# Patient Record
Sex: FEMALE | Race: BLACK OR AFRICAN AMERICAN | Employment: OTHER | URBAN - METROPOLITAN AREA
[De-identification: names, ages, dates, MRNs, and addresses within clinical notes are randomized per-mention and may not be internally consistent; named-entity substitution may affect disease eponyms.]

---

## 2017-05-27 ENCOUNTER — APPOINTMENT (OUTPATIENT)
Dept: GENERAL RADIOLOGY | Age: 72
DRG: 281 | End: 2017-05-27
Attending: NURSE PRACTITIONER
Payer: MEDICARE

## 2017-05-27 ENCOUNTER — APPOINTMENT (OUTPATIENT)
Dept: CT IMAGING | Age: 72
DRG: 281 | End: 2017-05-27
Attending: NURSE PRACTITIONER
Payer: MEDICARE

## 2017-05-27 ENCOUNTER — HOSPITAL ENCOUNTER (INPATIENT)
Age: 72
LOS: 3 days | Discharge: ANOTHER HOSPITAL | DRG: 281 | End: 2017-05-30
Attending: EMERGENCY MEDICINE | Admitting: STUDENT IN AN ORGANIZED HEALTH CARE EDUCATION/TRAINING PROGRAM
Payer: MEDICARE

## 2017-05-27 DIAGNOSIS — K85.90 ACUTE PANCREATITIS, UNSPECIFIED COMPLICATION STATUS, UNSPECIFIED PANCREATITIS TYPE: Primary | ICD-10-CM

## 2017-05-27 DIAGNOSIS — R77.8 ELEVATED TROPONIN: ICD-10-CM

## 2017-05-27 PROBLEM — I50.9 CHF (CONGESTIVE HEART FAILURE) (HCC): Status: ACTIVE | Noted: 2017-05-27

## 2017-05-27 PROBLEM — F10.20 ALCOHOLISM (HCC): Status: ACTIVE | Noted: 2017-05-27

## 2017-05-27 LAB
ALBUMIN SERPL BCP-MCNC: 3.4 G/DL (ref 3.5–5)
ALBUMIN/GLOB SERPL: 0.8 {RATIO} (ref 1.1–2.2)
ALP SERPL-CCNC: 107 U/L (ref 45–117)
ALT SERPL-CCNC: 32 U/L (ref 12–78)
AMMONIA PLAS-SCNC: <10 UMOL/L
AMPHET UR QL SCN: NEGATIVE
ANION GAP BLD CALC-SCNC: 20 MMOL/L (ref 5–15)
APPEARANCE UR: CLEAR
APPEARANCE UR: CLEAR
AST SERPL W P-5'-P-CCNC: 39 U/L (ref 15–37)
BACTERIA URNS QL MICRO: ABNORMAL /HPF
BACTERIA URNS QL MICRO: ABNORMAL /HPF
BARBITURATES UR QL SCN: NEGATIVE
BASOPHILS # BLD AUTO: 0.1 K/UL (ref 0–0.1)
BASOPHILS # BLD: 1 % (ref 0–1)
BENZODIAZ UR QL: NEGATIVE
BILIRUB SERPL-MCNC: 0.7 MG/DL (ref 0.2–1)
BILIRUB UR QL: NEGATIVE
BILIRUB UR QL: NEGATIVE
BNP SERPL-MCNC: 4731 PG/ML (ref 0–125)
BUN SERPL-MCNC: 6 MG/DL (ref 6–20)
BUN/CREAT SERPL: 7 (ref 12–20)
CALCIUM SERPL-MCNC: 8.8 MG/DL (ref 8.5–10.1)
CANNABINOIDS UR QL SCN: NEGATIVE
CHLORIDE SERPL-SCNC: 95 MMOL/L (ref 97–108)
CK MB CFR SERPL CALC: 1.2 % (ref 0–2.5)
CK MB CFR SERPL CALC: 1.5 % (ref 0–2.5)
CK MB SERPL-MCNC: 4.1 NG/ML (ref 5–25)
CK MB SERPL-MCNC: 6.2 NG/ML (ref 5–25)
CK SERPL-CCNC: 356 U/L (ref 26–192)
CK SERPL-CCNC: 417 U/L (ref 26–192)
CO2 SERPL-SCNC: 21 MMOL/L (ref 21–32)
COCAINE UR QL SCN: NEGATIVE
COLOR UR: ABNORMAL
COLOR UR: ABNORMAL
CREAT SERPL-MCNC: 0.86 MG/DL (ref 0.55–1.02)
DIFFERENTIAL METHOD BLD: ABNORMAL
DRUG SCRN COMMENT,DRGCM: NORMAL
EOSINOPHIL # BLD: 0 K/UL (ref 0–0.4)
EOSINOPHIL NFR BLD: 0 % (ref 0–7)
EPITH CASTS URNS QL MICRO: ABNORMAL /LPF
EPITH CASTS URNS QL MICRO: ABNORMAL /LPF
ERYTHROCYTE [DISTWIDTH] IN BLOOD BY AUTOMATED COUNT: 16.2 % (ref 11.5–14.5)
ETHANOL SERPL-MCNC: 79 MG/DL
FOLATE SERPL-MCNC: 6.3 NG/ML (ref 5–21)
GLOBULIN SER CALC-MCNC: 4.4 G/DL (ref 2–4)
GLUCOSE SERPL-MCNC: 103 MG/DL (ref 65–100)
GLUCOSE UR STRIP.AUTO-MCNC: NEGATIVE MG/DL
GLUCOSE UR STRIP.AUTO-MCNC: NEGATIVE MG/DL
HCT VFR BLD AUTO: 34.3 % (ref 35–47)
HGB BLD-MCNC: 11.5 G/DL (ref 11.5–16)
HGB UR QL STRIP: ABNORMAL
HGB UR QL STRIP: ABNORMAL
INR PPP: 1 (ref 0.9–1.1)
KETONES UR QL STRIP.AUTO: ABNORMAL MG/DL
KETONES UR QL STRIP.AUTO: NEGATIVE MG/DL
LEUKOCYTE ESTERASE UR QL STRIP.AUTO: ABNORMAL
LEUKOCYTE ESTERASE UR QL STRIP.AUTO: ABNORMAL
LIPASE SERPL-CCNC: 703 U/L (ref 73–393)
LYMPHOCYTES # BLD AUTO: 24 % (ref 12–49)
LYMPHOCYTES # BLD: 1.3 K/UL (ref 0.8–3.5)
MCH RBC QN AUTO: 29.3 PG (ref 26–34)
MCHC RBC AUTO-ENTMCNC: 33.5 G/DL (ref 30–36.5)
MCV RBC AUTO: 87.5 FL (ref 80–99)
METHADONE UR QL: NEGATIVE
MONOCYTES # BLD: 0.4 K/UL (ref 0–1)
MONOCYTES NFR BLD AUTO: 8 % (ref 5–13)
NEUTS SEG # BLD: 3.7 K/UL (ref 1.8–8)
NEUTS SEG NFR BLD AUTO: 67 % (ref 32–75)
NITRITE UR QL STRIP.AUTO: NEGATIVE
NITRITE UR QL STRIP.AUTO: POSITIVE
OPIATES UR QL: NEGATIVE
PCP UR QL: NEGATIVE
PH UR STRIP: 6.5 [PH] (ref 5–8)
PH UR STRIP: 7.5 [PH] (ref 5–8)
PLATELET # BLD AUTO: 351 K/UL (ref 150–400)
POTASSIUM SERPL-SCNC: 4 MMOL/L (ref 3.5–5.1)
PROT SERPL-MCNC: 7.8 G/DL (ref 6.4–8.2)
PROT UR STRIP-MCNC: 30 MG/DL
PROT UR STRIP-MCNC: 30 MG/DL
PROTHROMBIN TIME: 9.9 SEC (ref 9–11.1)
RBC # BLD AUTO: 3.92 M/UL (ref 3.8–5.2)
RBC #/AREA URNS HPF: ABNORMAL /HPF (ref 0–5)
RBC #/AREA URNS HPF: ABNORMAL /HPF (ref 0–5)
RBC MORPH BLD: ABNORMAL
SODIUM SERPL-SCNC: 136 MMOL/L (ref 136–145)
SP GR UR REFRACTOMETRY: <1.005 (ref 1–1.03)
SP GR UR REFRACTOMETRY: <1.005 (ref 1–1.03)
TROPONIN I SERPL-MCNC: 0.31 NG/ML
TROPONIN I SERPL-MCNC: 0.34 NG/ML
TSH SERPL DL<=0.05 MIU/L-ACNC: 2.42 UIU/ML (ref 0.36–3.74)
UA: UC IF INDICATED,UAUC: ABNORMAL
UROBILINOGEN UR QL STRIP.AUTO: 0.2 EU/DL (ref 0.2–1)
UROBILINOGEN UR QL STRIP.AUTO: 0.2 EU/DL (ref 0.2–1)
WBC # BLD AUTO: 5.5 K/UL (ref 3.6–11)
WBC URNS QL MICRO: ABNORMAL /HPF (ref 0–4)
WBC URNS QL MICRO: ABNORMAL /HPF (ref 0–4)

## 2017-05-27 PROCEDURE — 81001 URINALYSIS AUTO W/SCOPE: CPT | Performed by: STUDENT IN AN ORGANIZED HEALTH CARE EDUCATION/TRAINING PROGRAM

## 2017-05-27 PROCEDURE — 85610 PROTHROMBIN TIME: CPT | Performed by: NURSE PRACTITIONER

## 2017-05-27 PROCEDURE — 82550 ASSAY OF CK (CPK): CPT | Performed by: NURSE PRACTITIONER

## 2017-05-27 PROCEDURE — 87086 URINE CULTURE/COLONY COUNT: CPT | Performed by: NURSE PRACTITIONER

## 2017-05-27 PROCEDURE — 99285 EMERGENCY DEPT VISIT HI MDM: CPT

## 2017-05-27 PROCEDURE — 70450 CT HEAD/BRAIN W/O DYE: CPT

## 2017-05-27 PROCEDURE — 80307 DRUG TEST PRSMV CHEM ANLYZR: CPT | Performed by: NURSE PRACTITIONER

## 2017-05-27 PROCEDURE — 85025 COMPLETE CBC W/AUTO DIFF WBC: CPT | Performed by: NURSE PRACTITIONER

## 2017-05-27 PROCEDURE — 84443 ASSAY THYROID STIM HORMONE: CPT | Performed by: STUDENT IN AN ORGANIZED HEALTH CARE EDUCATION/TRAINING PROGRAM

## 2017-05-27 PROCEDURE — 65660000000 HC RM CCU STEPDOWN

## 2017-05-27 PROCEDURE — 93005 ELECTROCARDIOGRAM TRACING: CPT

## 2017-05-27 PROCEDURE — 71020 XR CHEST PA LAT: CPT

## 2017-05-27 PROCEDURE — 83690 ASSAY OF LIPASE: CPT | Performed by: NURSE PRACTITIONER

## 2017-05-27 PROCEDURE — 83880 ASSAY OF NATRIURETIC PEPTIDE: CPT | Performed by: NURSE PRACTITIONER

## 2017-05-27 PROCEDURE — 36415 COLL VENOUS BLD VENIPUNCTURE: CPT | Performed by: STUDENT IN AN ORGANIZED HEALTH CARE EDUCATION/TRAINING PROGRAM

## 2017-05-27 PROCEDURE — 82746 ASSAY OF FOLIC ACID SERUM: CPT | Performed by: STUDENT IN AN ORGANIZED HEALTH CARE EDUCATION/TRAINING PROGRAM

## 2017-05-27 PROCEDURE — 87077 CULTURE AEROBIC IDENTIFY: CPT | Performed by: NURSE PRACTITIONER

## 2017-05-27 PROCEDURE — 87186 SC STD MICRODIL/AGAR DIL: CPT | Performed by: NURSE PRACTITIONER

## 2017-05-27 PROCEDURE — 93306 TTE W/DOPPLER COMPLETE: CPT

## 2017-05-27 PROCEDURE — 80053 COMPREHEN METABOLIC PANEL: CPT | Performed by: NURSE PRACTITIONER

## 2017-05-27 PROCEDURE — 84484 ASSAY OF TROPONIN QUANT: CPT | Performed by: NURSE PRACTITIONER

## 2017-05-27 PROCEDURE — 74011250637 HC RX REV CODE- 250/637: Performed by: NURSE PRACTITIONER

## 2017-05-27 PROCEDURE — 74011250637 HC RX REV CODE- 250/637: Performed by: STUDENT IN AN ORGANIZED HEALTH CARE EDUCATION/TRAINING PROGRAM

## 2017-05-27 PROCEDURE — 82140 ASSAY OF AMMONIA: CPT | Performed by: NURSE PRACTITIONER

## 2017-05-27 PROCEDURE — 74011000258 HC RX REV CODE- 258: Performed by: STUDENT IN AN ORGANIZED HEALTH CARE EDUCATION/TRAINING PROGRAM

## 2017-05-27 PROCEDURE — 74011250636 HC RX REV CODE- 250/636: Performed by: STUDENT IN AN ORGANIZED HEALTH CARE EDUCATION/TRAINING PROGRAM

## 2017-05-27 PROCEDURE — 81001 URINALYSIS AUTO W/SCOPE: CPT | Performed by: NURSE PRACTITIONER

## 2017-05-27 RX ORDER — FUROSEMIDE 40 MG/1
40 TABLET ORAL DAILY
Status: DISCONTINUED | OUTPATIENT
Start: 2017-05-28 | End: 2017-05-28

## 2017-05-27 RX ORDER — SODIUM CHLORIDE 0.9 % (FLUSH) 0.9 %
5-10 SYRINGE (ML) INJECTION EVERY 8 HOURS
Status: DISCONTINUED | OUTPATIENT
Start: 2017-05-27 | End: 2017-05-30 | Stop reason: HOSPADM

## 2017-05-27 RX ORDER — CARVEDILOL 25 MG/1
25 TABLET ORAL 2 TIMES DAILY WITH MEALS
COMMUNITY
End: 2020-01-28

## 2017-05-27 RX ORDER — DOCUSATE SODIUM 100 MG/1
100 CAPSULE, LIQUID FILLED ORAL 2 TIMES DAILY
Status: DISCONTINUED | OUTPATIENT
Start: 2017-05-27 | End: 2017-05-30 | Stop reason: HOSPADM

## 2017-05-27 RX ORDER — CARVEDILOL 12.5 MG/1
25 TABLET ORAL 2 TIMES DAILY WITH MEALS
Status: DISCONTINUED | OUTPATIENT
Start: 2017-05-27 | End: 2017-05-28

## 2017-05-27 RX ORDER — ATORVASTATIN CALCIUM 40 MG/1
80 TABLET, FILM COATED ORAL DAILY
Status: DISCONTINUED | OUTPATIENT
Start: 2017-05-28 | End: 2017-05-29

## 2017-05-27 RX ORDER — ZOLPIDEM TARTRATE 5 MG/1
5 TABLET ORAL
Status: DISCONTINUED | OUTPATIENT
Start: 2017-05-27 | End: 2017-05-30 | Stop reason: HOSPADM

## 2017-05-27 RX ORDER — ATORVASTATIN CALCIUM 80 MG/1
80 TABLET, FILM COATED ORAL DAILY
COMMUNITY

## 2017-05-27 RX ORDER — CYANOCOBALAMIN 1000 UG/ML
1000 INJECTION, SOLUTION INTRAMUSCULAR; SUBCUTANEOUS DAILY
Status: DISCONTINUED | OUTPATIENT
Start: 2017-05-28 | End: 2017-05-28

## 2017-05-27 RX ORDER — ONDANSETRON 2 MG/ML
4 INJECTION INTRAMUSCULAR; INTRAVENOUS
Status: DISCONTINUED | OUTPATIENT
Start: 2017-05-27 | End: 2017-05-30 | Stop reason: HOSPADM

## 2017-05-27 RX ORDER — LORAZEPAM 1 MG/1
0.5 TABLET ORAL
Status: COMPLETED | OUTPATIENT
Start: 2017-05-27 | End: 2017-05-27

## 2017-05-27 RX ORDER — ACETAMINOPHEN 325 MG/1
650 TABLET ORAL
Status: DISCONTINUED | OUTPATIENT
Start: 2017-05-27 | End: 2017-05-30 | Stop reason: HOSPADM

## 2017-05-27 RX ORDER — LORAZEPAM 2 MG/ML
4 INJECTION INTRAMUSCULAR
Status: DISCONTINUED | OUTPATIENT
Start: 2017-05-27 | End: 2017-05-28

## 2017-05-27 RX ORDER — ALLOPURINOL 100 MG/1
100 TABLET ORAL
COMMUNITY

## 2017-05-27 RX ORDER — SODIUM CHLORIDE 0.9 % (FLUSH) 0.9 %
5-10 SYRINGE (ML) INJECTION AS NEEDED
Status: DISCONTINUED | OUTPATIENT
Start: 2017-05-27 | End: 2017-05-30 | Stop reason: HOSPADM

## 2017-05-27 RX ORDER — TRAMADOL HYDROCHLORIDE 50 MG/1
50-100 TABLET ORAL
COMMUNITY
End: 2020-01-28

## 2017-05-27 RX ORDER — LORAZEPAM 2 MG/ML
2 INJECTION INTRAMUSCULAR
Status: DISPENSED | OUTPATIENT
Start: 2017-05-27 | End: 2017-05-27

## 2017-05-27 RX ORDER — CLOPIDOGREL BISULFATE 75 MG/1
75 TABLET ORAL
COMMUNITY

## 2017-05-27 RX ORDER — FUROSEMIDE 40 MG/1
40 TABLET ORAL DAILY
Status: ON HOLD | COMMUNITY
End: 2020-01-28 | Stop reason: SDUPTHER

## 2017-05-27 RX ORDER — CLOPIDOGREL BISULFATE 75 MG/1
75 TABLET ORAL DAILY
Status: DISCONTINUED | OUTPATIENT
Start: 2017-05-27 | End: 2017-05-30 | Stop reason: HOSPADM

## 2017-05-27 RX ORDER — LORAZEPAM 2 MG/ML
2 INJECTION INTRAMUSCULAR
Status: DISCONTINUED | OUTPATIENT
Start: 2017-05-27 | End: 2017-05-28

## 2017-05-27 RX ORDER — THIAMINE HYDROCHLORIDE 100 MG/ML
100 INJECTION, SOLUTION INTRAMUSCULAR; INTRAVENOUS DAILY
Status: DISCONTINUED | OUTPATIENT
Start: 2017-05-28 | End: 2017-05-29

## 2017-05-27 RX ADMIN — LORAZEPAM 2 MG: 2 INJECTION INTRAMUSCULAR; INTRAVENOUS at 16:58

## 2017-05-27 RX ADMIN — CARVEDILOL 25 MG: 12.5 TABLET, FILM COATED ORAL at 17:03

## 2017-05-27 RX ADMIN — DOCUSATE SODIUM 100 MG: 100 CAPSULE, LIQUID FILLED ORAL at 16:56

## 2017-05-27 RX ADMIN — CEFTRIAXONE SODIUM 1 G: 1 INJECTION, POWDER, FOR SOLUTION INTRAMUSCULAR; INTRAVENOUS at 19:28

## 2017-05-27 RX ADMIN — CLOPIDOGREL BISULFATE 75 MG: 75 TABLET ORAL at 16:56

## 2017-05-27 RX ADMIN — Medication 10 ML: at 23:52

## 2017-05-27 RX ADMIN — LORAZEPAM 0.5 MG: 1 TABLET ORAL at 13:23

## 2017-05-27 RX ADMIN — Medication 10 ML: at 17:00

## 2017-05-27 NOTE — PROGRESS NOTES
Pharmacy Medication Reconciliation     Recommendations/Findings:   1) pt takes tramadol 50 mg tab 1 or 2 tabs daily as needed for pain    -Clarified PTA med list with patient and rx query. PTA medication list was corrected to the following:     Prior to Admission Medications   Prescriptions Last Dose Informant Patient Reported? Taking?   allopurinol (ZYLOPRIM) 100 mg tablet Unknown at Unknown time  Yes No   Sig: Take 100 mg by mouth daily. atorvastatin (LIPITOR) 80 mg tablet Unknown at Unknown time  Yes No   Sig: Take 80 mg by mouth daily. carvedilol (COREG) 25 mg tablet Unknown at Unknown time  Yes No   Sig: Take 25 mg by mouth two (2) times daily (with meals). clopidogrel (PLAVIX) 75 mg tab Unknown at Unknown time  Yes No   Sig: Take 75 mg by mouth. furosemide (LASIX) 40 mg tablet Unknown at Unknown time  Yes No   Sig: Take 40 mg by mouth daily. traMADol (ULTRAM) 50 mg tablet   Yes Yes   Sig: Take  mg by mouth daily as needed for Pain.       Facility-Administered Medications: None          Thank you,  KERI Espinoza [facility]

## 2017-05-27 NOTE — IP AVS SNAPSHOT
Anthony Joel 
 
 
 [address] [place] Patient: Jazz Penaloza MRN: [id number] :1945 You are allergic to the following No active allergies Recent Documentation Height Weight Breastfeeding? BMI Smoking Status 1.524 m 89.7 kg No 38.63 kg/m2 Former Smoker Emergency Contacts Name Discharge Info Relation Home Work Mobile [facility] DISCHARGE CAREGIVER [3] Sister [23] [phone] Sudarshan Harris DISCHARGE CAREGIVER [3] Other Relative [6]   [phone] About your hospitalization You were admitted on:  May 27, 2017 You last received care in the:  [address] You were discharged on:  May 30, 2017 Unit phone number:  [phone] Why you were hospitalized Your primary diagnosis was:  Alcoholism (Hcc) Your diagnoses also included:  Elevated Troponin, Chf (Congestive Heart Failure) (Hcc) Providers Seen During Your Hospitalizations Provider Role Specialty Primary office phone Sol Shipley MD Attending Provider Emergency Medicine [phone] Bee Kemp MD Attending Provider Internal Medicine [phone] Your Primary Care Physician (PCP) Primary Care Physician Office Phone Office Fax NONE ** None ** ** None ** Follow-up Information Follow up With Details Comments Contact Info [facility] [place] [address] [facility]. will be contacted by a health  to schedule a home visit. [address] [facility] Elvira Mcdonald MD Go on 2017 Your appointment is scheduled for 17 at [facility] [address]. [address] 
[phone] [facility] Cardiology  You will be contacted by Dr. Darrell Knight office with your follow-up appointment date and time. [address] [facility]. [facility] [address] [phone]  None   None (395) Patient stated that they have no PCP 
  
  
 Current Discharge Medication List  
  
ASK your doctor about these medications Dose & Instructions Dispensing Information Comments Morning Noon Evening Bedtime  
 allopurinol 100 mg tablet Commonly known as:  Sampson Mcrae Your last dose was: Your next dose is:    
   
   
 Dose:  100 mg Take 100 mg by mouth daily. Refills:  0  
     
   
   
   
  
 atorvastatin 80 mg tablet Commonly known as:  LIPITOR Your last dose was: Your next dose is:    
   
   
 Dose:  80 mg Take 80 mg by mouth daily. Refills:  0  
     
   
   
   
  
 carvedilol 25 mg tablet Commonly known as:  Sade Stable Your last dose was: Your next dose is:    
   
   
 Dose:  25 mg Take 25 mg by mouth two (2) times daily (with meals). Refills:  0  
     
   
   
   
  
 clopidogrel 75 mg Tab Commonly known as:  PLAVIX Your last dose was: Your next dose is:    
   
   
 Dose:  75 mg Take 75 mg by mouth. Refills:  0  
     
   
   
   
  
 LASIX 40 mg tablet Generic drug:  furosemide Your last dose was: Your next dose is:    
   
   
 Dose:  40 mg Take 40 mg by mouth daily. Refills:  0  
     
   
   
   
  
 traMADol 50 mg tablet Commonly known as:  ULTRAM  
   
Your last dose was: Your next dose is:    
   
   
 Dose:   mg Take  mg by mouth daily as needed for Pain. Refills:  0 Discharge Instructions None Discharge Orders None [facility] Announce[facility] We are excited to announce that we are making your provider's discharge notes available to you in [employer]. You will see these notes when they are completed and signed by the physician that discharged you from your recent hospital stay.   If you have any questions or concerns about any information you see in [employer], please call the [facility] Department where you were seen or reach out to your Primary Care Provider for more information about your plan of care. Introducing \[facility] [place] [facility]\"" & HEALTH SERVICES! [employer] introduces [employer] patient portal. Now you can access parts of your medical record, email your doctor's office, and request medication refills online. 1. In your internet browser, go to [email or web address] 2. Click on the First Time User? Click Here link in the Sign In box. You will see the New Member Sign Up page. 3. Enter your [employer] Access Code exactly as it appears below. You will not need to use this code after youve completed the sign-up process. If you do not sign up before the expiration date, you must request a new code. · [employer] Access Code: [email or web address] Expires: 8/28/2017  5:36 PM 
 
4. Enter the last four digits of your Social Security Number (xxxx) and Date of Birth (mm/dd/yyyy) as indicated and click Submit. You will be taken to the next sign-up page. 5. Create a [employer] ID. This will be your [employer] login ID and cannot be changed, so think of one that is secure and easy to remember. 6. Create a [employer] password. You can change your password at any time. 7. Enter your Password Reset Question and Answer. This can be used at a later time if you forget your password. 8. Enter your e-mail address. You will receive e-mail notification when new information is available in [address] 9. Click Sign Up. You can now view and download portions of your medical record. 10. Click the Download Summary menu link to download a portable copy of your medical information. If you have questions, please visit the Frequently Asked Questions section of the [employer] website. Remember, [employer] is NOT to be used for urgent needs. For medical emergencies, dial 911. Now available from your iPhone and Android! General Information Please provide this summary of care documentation to your next provider. Patient Signature:  ____________________________________________________________ Date:  ____________________________________________________________  
  
Rd Search Provider Signature:  ____________________________________________________________ Date:  ____________________________________________________________

## 2017-05-27 NOTE — ED NOTES
Pt arrives in the ED with her sister . Pt reports that sister wanted her to come to get help for her alcohol problem. Pt has no physical complaints. Pt reports drinking a couple of cups of Gin daily x couple of months worsening over past week. Pt has history of alcoholism. Denies history of DTs.

## 2017-05-27 NOTE — ED NOTES
Pt's meal tray that was ordered with provider approval was removed due to patient experiencing upper abdominal pain. Provider notified.

## 2017-05-27 NOTE — ED TRIAGE NOTES
Pt arrived to ED with sister. Pt denies having any problems or concerns. Sister states she brought patient because she \"has an alcohol problem and won't answer my phone calls, she needs a detox program\". Pt's sister states that patient does not eat and is unable to care for self. Pt states last drink was yesterday, pt denies having a drinking problem. Pt in no acute distress.

## 2017-05-27 NOTE — ROUTINE PROCESS
TRANSFER - OUT REPORT:    Verbal report given to Jamia(name) on Nadia Lazar  being transferred to PCU(unit) for routine progression of care       Report consisted of patients Situation, Background, Assessment and   Recommendations(SBAR). Information from the following report(s) SBAR, ED Summary, [facility] and Recent Results was reviewed with the receiving nurse. Lines:   Peripheral IV 05/27/17 Right Antecubital (Active)   Site Assessment Clean, dry, & intact 5/27/2017 11:26 AM   Phlebitis Assessment 0 5/27/2017 11:26 AM   Infiltration Assessment 0 5/27/2017 11:26 AM   Dressing Status Clean, dry, & intact 5/27/2017 11:26 AM   Hub Color/Line Status Blue 5/27/2017 11:26 AM   Action Taken Blood drawn 5/27/2017 11:26 AM        Opportunity for questions and clarification was provided.       Patient transported with:   Registered Nurse

## 2017-05-27 NOTE — H&P
Pt Name  Chen Valentine   Date of Birth 1945   Medical Record Number  [id number]      Age  80 y.o. PCP None   Admit date:  5/27/2017    Room Number  PCU2/01  @ [facility]   Date of Service  5/27/2017    Chart reviewed   Pt seen and examined       Admission Diagnoses:  Alcoholism [facility])     We are admitting Chen Valentine 80 y.o. female with a principle diagnosis of Alcoholism ([facility] this patient also suffers from other comorbidities listed below. I have a high level of concern for  leading to life threatening complications      Assessment and plan:    ? 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 ED: [address] [facility]/ Hb 12/   Dirty UA/ Cr 0.8  BNP 5K  EKG Q wave in 3,   BAL 89  EKG sinus tachycardia    CXR: The heart is mildly  enlarged, and the patient is post valvuloplasty, CABG, and pacemaker/AICD  placement     Principal Problem:    Alcoholism ([facility] (5/27/2017)    Active Problems:    Elevated troponin (5/27/2017)      CHF (congestive heart failure) ([facility] (5/27/2017)        Body mass index is 27.34 kg/(m^2). Functional Status  good   CODE STATUS  Full   Surrogate decision maker: Sister Luis F Mcleod  SCD's   Discharge Plan: Home w/Family,    There are currently no Active Isolations Payor: [employer] MEDICARE / Plan: [address] [place] [address] HMO / Product Type: Managed Care Medicare /    Social issues  Date Comment           Prognosis          PC:\"Drinking some whiskey and it made me sick. \"    History of Present Illness :  Miss Minna Black is a previous alcoholic but stop drinking for approximately 15 years. Unfortunately, she went to a party recently and resumed drinking heavily over the last three weeks.    Her sister visited today and become concerned as the house was unkempt and patient have not been answering the phone so she brought her to the hospital she stated that the patient was simply not looking herself, and there was no food in the house. Patient also complains of abdominal pain with nausea and vomiting in the last few days. Denies fever or urinary symptoms     History reviewed. No pertinent past medical history. Past Surgical History:   Procedure Laterality Date    CARDIAC SURG PROCEDURE UNLIST      pacemaker/defibrilator placed 2015    HX GYN      hysterectomy      No Known Allergies   Social History     Social History    Marital status:      Spouse name: N/A    Number of children: N/A    Years of education: N/A     Occupational History    Not on file. Social History Main Topics    Smoking status: Former Smoker    Smokeless tobacco: Not on file    Alcohol use No    Drug use: No    Sexual activity: Not on file     Other Topics Concern    Not on file     Social History Narrative    Patient lives alone . She is  and her only daughter  of dialysis related complications. Sister Kari Client to be her medical power of  . Full code.      Social History   Substance Use Topics    Smoking status: Former Smoker    Smokeless tobacco: Not on file    Alcohol use No       Family History   Problem Relation Age of Onset    Heart Disease Other     Kidney Disease Other        Review of Systems:  (negative unless bold)    Gen:  Weight gain, weight loss, fever, chills, fatigue  Eyes:  Visual changes, pain, conjunctivitis  ENT:  Sore throat, rhinorrhea, decreased hearing  CVS:  Palpitations, chest pain, dizziness, syncope, edema, PND  Pulm:  Cough, dyspnea, sputum, hemoptysis, wheezing  GI:  Abdominal pain, nausea, emesis, diarrhea, constipation, GERD, melena  :  Hematuria, incontinence, nocturia, dysuria, discharge  MS:  Pain, weakness, swelling, arthritis  Skin:  Rash, erythema, abscess, wound, moles  Psych: Insomnia, depression, anxiety, crying, suicidal ideation  Endo:  Heat intolerance, cold intolerance, weight gain, polyuria  Hem:  Enlarged nodes, bruising, bleeding, night sweats  Renal:  Edema, change in urine, flank pain  Neuro:  Numbness, tingling, weakness, seizure, headache, tremors          Physical Exam:    Gen:  Obese, in no acute distress  HEENT:  Pink conjunctivae, PERRL, hearing intact to voice, moist mucous membranes  Neck:  Supple, without masses, thyroid non-tender  Resp: Decreased AE at the L base  Card:  No murmurs, normal S1, S2 without thrills,2+ Bipedal edema  Abd:  Soft, non-tender, non-distended, normoactive bowel sounds are present, no palpable organomegaly  Lymph:  No cervical adenopathy  Musc:  No cyanosis or clubbing  Skin:  No rashes or ulcers, skin turgor is good  Neuro:  Cranial nerves 3-12 are grossly intact,  strength is 5/5 bilaterally, dorsi / plantarflexion strength is 5/5 bilaterally, follows commands appropriately  Psych:  Alert with good insight. Oriented to person, place, and time      Home Meds     Prior to Admission medications    Medication Sig Start Date End Date Taking? Authorizing Provider   carvedilol (COREG) 25 mg tablet Take 25 mg by mouth two (2) times daily (with meals). Angelina Calvo MD   allopurinol (ZYLOPRIM) 100 mg tablet Take 100 mg by mouth daily. Angelina Calvo MD   atorvastatin (LIPITOR) 80 mg tablet Take 80 mg by mouth daily. Angelina Calvo MD   furosemide (LASIX) 40 mg tablet Take 40 mg by mouth daily. Angelina Calvo MD   clopidogrel (PLAVIX) 75 mg tab Take 75 mg by mouth. Angelina Calvo MD   traMADol (ULTRAM) 50 mg tablet Take  mg by mouth daily as needed for Pain. Yes Historical Provider       Relevant other informations:      Other medical conditions listed in this following active hospital problem list section; all of these and other pertinent data were taken into consideration when treatment plan is developed and customized to this patient's unique overall circumstances and needs. Patient Active Problem List    Diagnosis Date Noted    Alcoholism ([facility] 05/27/2017    Elevated troponin 05/27/2017    CHF (congestive heart failure) ([facility] 05/27/2017        Data Review:   Recent Days:  Recent Labs      05/27/17   1123   WBC  5.5   HGB  11.5   HCT  34.3*   PLT  351     Recent Labs      05/27/17   1226  05/27/17   1123   NA   --   136   K   --   4.0   CL   --   95*   CO2   --   21   GLU   --   103*   BUN   --   6   CREA   --   0.86   CA   --   8.8   ALB   --   3.4*   TBILI   --   0.7   SGOT   --   39*   ALT   --   32   INR  1.0   --      No results found for: TSH, TSHEXT, TSHEXT  ______________________________________________________________________________________________________    Medications reviewed     No current facility-administered medications for this encounter.        _________________________________________________________________________________  Care Plan discussed with: Patient/Family  ______________________________________________________________________________________________________    High complexity decision making was performed for this patient who is at high risk for decompensation with multiple organ involvement. Today total floor/unit time was 60 minutes while caring for this patient and greater than 50% of that time was spent with patient (and/or family) discussing patients clinical issues.     ________________________________________________________________________  Km Camel MD                            5/27/2017 1:18 PM   ________________________________________________________________________

## 2017-05-27 NOTE — ROUTINE PROCESS
Primary Nurse Axel Gunter RN and Brian Veras nurse performed a dual skin assessment on this patient No impairment noted  Roverto score is 23

## 2017-05-27 NOTE — IP AVS SNAPSHOT
Current Discharge Medication List  
  
ASK your doctor about these medications Dose & Instructions Dispensing Information Comments Morning Noon Evening Bedtime  
 allopurinol 100 mg tablet Commonly known as:  Elier Nuñez Your last dose was: Your next dose is:    
   
   
 Dose:  100 mg Take 100 mg by mouth daily. Refills:  0  
     
   
   
   
  
 atorvastatin 80 mg tablet Commonly known as:  LIPITOR Your last dose was: Your next dose is:    
   
   
 Dose:  80 mg Take 80 mg by mouth daily. Refills:  0  
     
   
   
   
  
 carvedilol 25 mg tablet Commonly known as:  Joselyn Hightower Your last dose was: Your next dose is:    
   
   
 Dose:  25 mg Take 25 mg by mouth two (2) times daily (with meals). Refills:  0  
     
   
   
   
  
 clopidogrel 75 mg Tab Commonly known as:  PLAVIX Your last dose was: Your next dose is:    
   
   
 Dose:  75 mg Take 75 mg by mouth. Refills:  0  
     
   
   
   
  
 LASIX 40 mg tablet Generic drug:  furosemide Your last dose was: Your next dose is:    
   
   
 Dose:  40 mg Take 40 mg by mouth daily. Refills:  0  
     
   
   
   
  
 traMADol 50 mg tablet Commonly known as:  ULTRAM  
   
Your last dose was: Your next dose is:    
   
   
 Dose:   mg Take  mg by mouth daily as needed for Pain. Refills:  0

## 2017-05-27 NOTE — ED NOTES
Pt given blanket. Plan of care discussed. No si/s of acute distress. Pt's family members given water/crackers per request. Call bell within reach.

## 2017-05-27 NOTE — ED NOTES
Emergency Department Nursing Plan of Care       The Nursing Plan of Care is developed from the Nursing assessment and Emergency Department Attending provider initial evaluation. The plan of care may be reviewed in the ED Provider note.     The Plan of Care was developed with the following considerations:   Patient / Family readiness to learn indicated by:verbalized understanding  Persons(s) to be included in education: patient  Barriers to Learning/Limitations:No    Signed     [facility]    5/27/2017   11:01 AM

## 2017-05-28 LAB
ANION GAP BLD CALC-SCNC: 9 MMOL/L (ref 5–15)
BUN SERPL-MCNC: 7 MG/DL (ref 6–20)
BUN/CREAT SERPL: 6 (ref 12–20)
CALCIUM SERPL-MCNC: 8.3 MG/DL (ref 8.5–10.1)
CHLORIDE SERPL-SCNC: 98 MMOL/L (ref 97–108)
CK MB CFR SERPL CALC: 0.9 % (ref 0–2.5)
CK MB SERPL-MCNC: 2.8 NG/ML (ref 5–25)
CK SERPL-CCNC: 319 U/L (ref 26–192)
CO2 SERPL-SCNC: 28 MMOL/L (ref 21–32)
CREAT SERPL-MCNC: 1.1 MG/DL (ref 0.55–1.02)
CRP SERPL-MCNC: 0.34 MG/DL (ref 0–0.6)
ERYTHROCYTE [DISTWIDTH] IN BLOOD BY AUTOMATED COUNT: 16.3 % (ref 11.5–14.5)
GLUCOSE SERPL-MCNC: 126 MG/DL (ref 65–100)
HCT VFR BLD AUTO: 32 % (ref 35–47)
HGB BLD-MCNC: 10.8 G/DL (ref 11.5–16)
MAGNESIUM SERPL-MCNC: 1.6 MG/DL (ref 1.6–2.4)
MCH RBC QN AUTO: 29.4 PG (ref 26–34)
MCHC RBC AUTO-ENTMCNC: 33.8 G/DL (ref 30–36.5)
MCV RBC AUTO: 87.2 FL (ref 80–99)
PLATELET # BLD AUTO: 303 K/UL (ref 150–400)
POTASSIUM SERPL-SCNC: 4.1 MMOL/L (ref 3.5–5.1)
RBC # BLD AUTO: 3.67 M/UL (ref 3.8–5.2)
SODIUM SERPL-SCNC: 135 MMOL/L (ref 136–145)
TROPONIN I SERPL-MCNC: 0.26 NG/ML
WBC # BLD AUTO: 4.3 K/UL (ref 3.6–11)

## 2017-05-28 PROCEDURE — 85027 COMPLETE CBC AUTOMATED: CPT | Performed by: STUDENT IN AN ORGANIZED HEALTH CARE EDUCATION/TRAINING PROGRAM

## 2017-05-28 PROCEDURE — 65660000000 HC RM CCU STEPDOWN

## 2017-05-28 PROCEDURE — 86140 C-REACTIVE PROTEIN: CPT | Performed by: STUDENT IN AN ORGANIZED HEALTH CARE EDUCATION/TRAINING PROGRAM

## 2017-05-28 PROCEDURE — 74011250636 HC RX REV CODE- 250/636: Performed by: STUDENT IN AN ORGANIZED HEALTH CARE EDUCATION/TRAINING PROGRAM

## 2017-05-28 PROCEDURE — 74011000258 HC RX REV CODE- 258: Performed by: STUDENT IN AN ORGANIZED HEALTH CARE EDUCATION/TRAINING PROGRAM

## 2017-05-28 PROCEDURE — 36415 COLL VENOUS BLD VENIPUNCTURE: CPT | Performed by: STUDENT IN AN ORGANIZED HEALTH CARE EDUCATION/TRAINING PROGRAM

## 2017-05-28 PROCEDURE — 83735 ASSAY OF MAGNESIUM: CPT

## 2017-05-28 PROCEDURE — 74011250636 HC RX REV CODE- 250/636: Performed by: EMERGENCY MEDICINE

## 2017-05-28 PROCEDURE — 74011250637 HC RX REV CODE- 250/637: Performed by: STUDENT IN AN ORGANIZED HEALTH CARE EDUCATION/TRAINING PROGRAM

## 2017-05-28 PROCEDURE — 74011250637 HC RX REV CODE- 250/637: Performed by: INTERNAL MEDICINE

## 2017-05-28 PROCEDURE — 74011250636 HC RX REV CODE- 250/636: Performed by: INTERNAL MEDICINE

## 2017-05-28 PROCEDURE — 80048 BASIC METABOLIC PNL TOTAL CA: CPT | Performed by: STUDENT IN AN ORGANIZED HEALTH CARE EDUCATION/TRAINING PROGRAM

## 2017-05-28 PROCEDURE — 82550 ASSAY OF CK (CPK): CPT | Performed by: STUDENT IN AN ORGANIZED HEALTH CARE EDUCATION/TRAINING PROGRAM

## 2017-05-28 PROCEDURE — 84484 ASSAY OF TROPONIN QUANT: CPT | Performed by: STUDENT IN AN ORGANIZED HEALTH CARE EDUCATION/TRAINING PROGRAM

## 2017-05-28 RX ORDER — CARVEDILOL 3.12 MG/1
6.25 TABLET ORAL 2 TIMES DAILY WITH MEALS
Status: DISCONTINUED | OUTPATIENT
Start: 2017-05-29 | End: 2017-05-30 | Stop reason: HOSPADM

## 2017-05-28 RX ORDER — CARVEDILOL 3.12 MG/1
6.25 TABLET ORAL ONCE
Status: COMPLETED | OUTPATIENT
Start: 2017-05-28 | End: 2017-05-28

## 2017-05-28 RX ORDER — DIAZEPAM 5 MG/1
5 TABLET ORAL
Status: DISCONTINUED | OUTPATIENT
Start: 2017-05-28 | End: 2017-05-30 | Stop reason: HOSPADM

## 2017-05-28 RX ORDER — DIAZEPAM 5 MG/1
10 TABLET ORAL
Status: DISCONTINUED | OUTPATIENT
Start: 2017-05-28 | End: 2017-05-30 | Stop reason: HOSPADM

## 2017-05-28 RX ORDER — SODIUM CHLORIDE 9 MG/ML
50 INJECTION, SOLUTION INTRAVENOUS CONTINUOUS
Status: DISPENSED | OUTPATIENT
Start: 2017-05-28 | End: 2017-05-29

## 2017-05-28 RX ORDER — MAGNESIUM SULFATE HEPTAHYDRATE 40 MG/ML
2 INJECTION, SOLUTION INTRAVENOUS
Status: COMPLETED | OUTPATIENT
Start: 2017-05-28 | End: 2017-05-28

## 2017-05-28 RX ADMIN — Medication 10 ML: at 20:09

## 2017-05-28 RX ADMIN — LORAZEPAM 2 MG: 2 INJECTION INTRAMUSCULAR; INTRAVENOUS at 03:21

## 2017-05-28 RX ADMIN — CARVEDILOL 6.25 MG: 3.12 TABLET, FILM COATED ORAL at 21:21

## 2017-05-28 RX ADMIN — Medication 10 ML: at 20:10

## 2017-05-28 RX ADMIN — CLOPIDOGREL BISULFATE 75 MG: 75 TABLET ORAL at 09:03

## 2017-05-28 RX ADMIN — ATORVASTATIN CALCIUM 80 MG: 40 TABLET, FILM COATED ORAL at 09:03

## 2017-05-28 RX ADMIN — ONDANSETRON 4 MG: 2 INJECTION INTRAMUSCULAR; INTRAVENOUS at 20:09

## 2017-05-28 RX ADMIN — Medication 10 ML: at 05:42

## 2017-05-28 RX ADMIN — MAGNESIUM SULFATE HEPTAHYDRATE 2 G: 40 INJECTION, SOLUTION INTRAVENOUS at 07:15

## 2017-05-28 RX ADMIN — CEFTRIAXONE SODIUM 1 G: 1 INJECTION, POWDER, FOR SOLUTION INTRAMUSCULAR; INTRAVENOUS at 19:45

## 2017-05-28 RX ADMIN — THIAMINE HYDROCHLORIDE 100 MG: 100 INJECTION, SOLUTION INTRAMUSCULAR; INTRAVENOUS at 09:03

## 2017-05-28 RX ADMIN — DIAZEPAM 5 MG: 5 TABLET ORAL at 12:33

## 2017-05-28 RX ADMIN — DOCUSATE SODIUM 100 MG: 100 CAPSULE, LIQUID FILLED ORAL at 09:03

## 2017-05-28 RX ADMIN — SODIUM CHLORIDE 50 ML/HR: 900 INJECTION, SOLUTION INTRAVENOUS at 19:35

## 2017-05-28 RX ADMIN — ONDANSETRON 4 MG: 2 INJECTION INTRAMUSCULAR; INTRAVENOUS at 12:33

## 2017-05-28 RX ADMIN — DIAZEPAM 5 MG: 5 TABLET ORAL at 22:48

## 2017-05-28 NOTE — PROGRESS NOTES
0730 Bedside and Verbal shift change report given to me (oncoming nurse) by RUY Snell RN (offgoing nurse). Report included the following information SBAR, ED Summary, Intake/Output, MAR, Recent Results and Cardiac Rhythm sinus rhythm with freq PVCs. Magnesium 2GM given per order for Mg++ of 1.6    Assisted OOB to ambulate to BR. Gait is slightly unsteady, patient follows instructions to ask for help    AM dose of Coreg and Lasix held for low BP. Dr. Michael Acosta notified. Medicated x1 for CIWA of 10 per [facility], specifically with episode of nausea retching and sensation of fullness in the Head    Seen by Dr. Janie Mckinley on consult as PVCs persist with frequency, including periods of trigeminy and couplets    Bedside and Verbal shift change report given to Rao Plascencia RN (oncoming nurse) by me (offgoing nurse). Report included the following information Kardex, Intake/Output, MAR, Recent Results and Cardiac Rhythm sinus rhythm with PVCs.

## 2017-05-28 NOTE — CONSULTS
Cardiology consultation:    I was asked by Dr. Nikko Beltran to assess this 80year old female who was admitted for acute/chronic alcohol intoxication but found to have an element of CHF with complex prior cardiac history and then frequent ventricular arrhythmias. She underwent open heart surgery two years ago for mitral valvuloplasty  After at least two episodes of coronary stenting she also underwent ICD placement. The most complex arrhythmia witnessed so far has been unifocal ventricular bigeminy, some times in extended runs. Ambulatory medications include coreg, allopurinol, atorvastatin, furosemide, plavix, and tramadol. Magnesium was given this morning because of the arrhythmias without visible effect. Coreg and furosemide were held today because of hypotension. On exam, she appears comfortable at 10 degrees torso elevation although she states she feels SOB. She isn't interested in sitting up, stating she feels better supine. BP is 83/39. This is lower that it's been but she has never been hypertensive during this hospital stay. Lungs are clear. Cardiac auscultation during a period of regular rhythm shows diminished S1 and normal S2 with variable S3 vs mitral opening snap. No clear diastolic murmur, very soft systolic murmur along LSB. There is no edema, and no sign of DVT. Autoinflating leggings in place. JV fill in current position. No audible carotid bruits. Chest X ray shows a pessary ring in the mitral position, an ICD in situ, and wire sutures from prior midline sternotomy. Labs show significant troponin elevation, marginally decreased kidney function, normal electrolytes, and elevated CK with marginally elevated MB. Results for Jovanny Odonnell (MRN [id number]) as of 5/28/2017 17:36   Ref.  Range 5/27/2017 11:56 5/27/2017 18:45 5/28/2017 03:29   Sodium Latest Ref Range: 136 - 145 mmol/L   135 (L)   Potassium Latest Ref Range: 3.5 - 5.1 mmol/L   4.1   Chloride Latest Ref Range: 97 - 108 mmol/L   98   CO2 Latest Ref Range: 21 - 32 mmol/L   28   Anion gap Latest Ref Range: 5 - 15 mmol/L   9   Glucose Latest Ref Range: 65 - 100 mg/dL   126 (H)   BUN Latest Ref Range: 6 - 20 MG/DL   7   Creatinine Latest Ref Range: 0.55 - 1.02 MG/DL   1.10 (H)   BUN/Creatinine ratio Latest Ref Range: 12 - 20     6 (L)   Calcium Latest Ref Range: 8.5 - 10.1 MG/DL   8.3 (L)   GFR est non-AA Latest Ref Range: >60 ml/min/1.73m2   49 (L)   GFR est AA Latest Ref Range: >60 ml/min/1.73m2   60 (L)   CK Latest Ref Range: 26 - 192 U/L  356 (H) 319 (H)   CK - MB Latest Ref Range: <3.6 NG/ML  4.1 (H) 2.8   CK-MB Index Latest Ref Range: 0 - 2.5    1.2 0.9   Troponin-I, Qt. Latest Ref Range: <0.05 ng/mL  0.31 (H) 0.26 (H)   Folate Latest Ref Range: 5.0 - 21.0 ng/mL  6.3    Ammonia Latest Ref Range: <32 UMOL/L <10     C-Reactive protein Latest Ref Range: 0.00 - 0.60 mg/dL   0.34     She is mildly anemic with normal WBC diff:    Results for Arlean Shone (MRN [id number]) as of 5/28/2017 17:36   Ref. Range 5/27/2017 11:23 5/28/2017 03:29   WBC Latest Ref Range: 3.6 - 11.0 K/uL 5.5 4.3   RBC Latest Ref Range: 3.80 - 5.20 M/uL 3.92 3.67 (L)   HGB Latest Ref Range: 11.5 - 16.0 g/dL 11.5 10.8 (L)   HCT Latest Ref Range: 35.0 - 47.0 % 34.3 (L) 32.0 (L)   MCV Latest Ref Range: 80.0 - 99.0 FL 87.5 87.2   MCH Latest Ref Range: 26.0 - 34.0 PG 29.3 29.4   MCHC Latest Ref Range: 30.0 - 36.5 g/dL 33.5 33.8   RDW Latest Ref Range: 11.5 - 14.5 % 16.2 (H) 16.3 (H)   PLATELET Latest Ref Range: 150 - 400 K/uL 351 303   NEUTROPHILS Latest Ref Range: 32 - 75 % 67    LYMPHOCYTES Latest Ref Range: 12 - 49 % 24      Echo shows significant ischemic scarring of the septum, dilated and hypofunctioning LV, complex abnormality of the mitral valve, with functional mild stenosis after repair, and elevated PA presure:    IMPRESSIONS:  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 Sclerotic/degenerative changes of aortic valve with mild AI.  Ischemic scar  of the septum with severe reduction in LV output. ICD in situ on the right  side. Significant reduction of cardiac output. SUMMARY:  Left ventricle: The ventricle was mildly to moderately dilated. Systolic  function was markedly reduced by visual assessment. Ejection fraction was  estimated to be 20 %. There was moderate diffuse hypokinesis. There was  akinesis of the basal-mid anteroseptal and apical septal wall(s). There  was akinesis and scarring of the basal-mid anteroseptal and apical septal  wall(s). Wall thickness was moderately increased. There was mild  concentric hypertrophy. Mass was borderline (top normal). Ventricular septum: The septum is mildly dyskinetic and appears infarcted. Right ventricle: The ventricle was moderately dilated. Systolic function  was moderately reduced. Systolic pressure was moderately to markedly  increased. Estimated peak pressure was 45 mmHg. Estimated peak pressure  was in the range of 50 mmHg. 12 lead EKG shows sinus tachycardia with unifocal VPC's and precordial symmetrical T wave inversion with QTc prolongation. Precordial R wave progression is diminished but does not suggest old injury. 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     Nitrates would be ideal if BP allows    Obtain records,and paticularly any previous perfusion imaging reports from [facility]    If the septal injury looks new after records review, consider perfusion imaging for possible revascularization    Thank you for this consultation    John Hoyt

## 2017-05-28 NOTE — PROGRESS NOTES
Physical therapy:     Physical therapy consult received and chart reviewed. Formal evaluation to follow tomorrow AM. Thank you.    Rai Jamison, PT

## 2017-05-28 NOTE — ED PROVIDER NOTES
HPI Comments: Navdeep Yuan is a 80 y.o. female presents to [facility] ED with cc of \"drinking again\"  Per patients sister who lives out of town and recently visited patient ,noted her sister had been drinking . said the house was not in order and there was no food in the house  Patients sister states patient had a drinking problem several years ago but had been sober until recently. No recent known stressors/ Patients sister states patient is confused  Patient states she went to a party a few weeks agoand took a drink   And had been drinking since then/  Patient specifically denies fever, chills, nausea, vomitng, chest pain, shortness of breath, headache, rash, diarrhea ,sweating or weight loss. denies fall. Patient is a 80 y.o. female presenting with alcohol problem. The history is provided by the patient and a relative. No  was used. Alcohol Problem   Primary symptoms include: confusion and intoxication. There areno seizures, no weakness, no agitation and no hallucinations present at this time. This is a recurrent problem. The current episode started more than 1 week ago. The problem has been gradually worsening. Suspected agents include alcohol. Pertinent negatives include no fever. Associated medical issues include depression. Associated medical issues do not include addiction treatment, mental illness and suicidal ideas. History reviewed. No pertinent past medical history. Past Surgical History:   Procedure Laterality Date    CARDIAC SURG PROCEDURE UNLIST      pacemaker/defibrilator placed 2 2015    HX GYN      hysterectomy         Family History:   Problem Relation Age of Onset    Heart Disease Other     Kidney Disease Other        Social History     Social History    Marital status:      Spouse name: N/A    Number of children: N/A    Years of education: N/A     Occupational History    Not on file.      Social History Main Topics    Smoking status: Former Smoker    Smokeless tobacco: Not on file    Alcohol use No    Drug use: No    Sexual activity: Not on file     Other Topics Concern    Not on file     Social History Narrative    Patient lives alone . She is  and her only daughter  of dialysis related complications. Sister Kristen Alexiff to be her medical power of  . Full code. ALLERGIES: Review of patient's allergies indicates no known allergies. Review of Systems   Constitutional: Negative for fatigue and fever. HENT: Negative for sore throat. Eyes: Negative for pain. Respiratory: Negative for shortness of breath and wheezing. Cardiovascular: Negative for chest pain and palpitations. Gastrointestinal: Negative for abdominal pain. Musculoskeletal: Negative for arthralgias and myalgias. Skin: Negative for pallor and rash. Neurological: Negative for dizziness, tremors, seizures, weakness and headaches. Hematological: Negative for adenopathy. Psychiatric/Behavioral: Positive for confusion and depression. Negative for agitation, behavioral problems, hallucinations and suicidal ideas. The patient is nervous/anxious. All other systems reviewed and are negative. Vitals:    17 1430 17 1600 17 1735 17 1947   BP: 150/80 137/78 122/61 (!) 89/47   Pulse: (!) 116 (!) 123 (!) 112 95   Resp: 20 23 20 23   Temp:  98.1 °F (36.7 °C)  98.1 °F (36.7 °C)   SpO2: 98% 100% 100% 100%   Weight:       Height:                Physical Exam   Constitutional: She is oriented to person, place, and time. She appears well-developed and well-nourished. No distress. P,leasant 80year old female   HENT:   Head: Normocephalic and atraumatic. Right Ear: External ear normal.   Left Ear: External ear normal.   Nose: Nose normal.   Mouth/Throat: Oropharynx is clear and moist.   Eyes: Conjunctivae are normal.   Neck: Normal range of motion. Neck supple. Cardiovascular: Normal rate and regular rhythm. Pulmonary/Chest: Effort normal and breath sounds normal. No respiratory distress. She has no wheezes. Abdominal: Soft. Bowel sounds are normal. There is no tenderness. Musculoskeletal: Normal range of motion. Lymphadenopathy:     She has no cervical adenopathy. Neurological: She is alert and oriented to person, place, and time. No cranial nerve deficit. Coordination normal.   Skin: Skin is warm and dry. No rash noted. Psychiatric: She has a normal mood and affect. Her behavior is normal. Judgment and thought content normal.   Nursing note and vitals reviewed.        MDM  Number of Diagnoses or Management Options  Acute pancreatitis, unspecified complication status, unspecified pancreatitis type:   Elevated troponin:   Diagnosis management comments: DDX alcohol dependence intoxication pancreatitis elevated LFTs UTI f       Amount and/or Complexity of Data Reviewed  Clinical lab tests: ordered and reviewed  Tests in the radiology section of CPT®: ordered and reviewed  Tests in the medicine section of CPT®: ordered and reviewed  Obtain history from someone other than the patient: yes  Discuss the patient with other providers: yes    Patient Progress  Patient progress: stable    ED Course       Procedures    LABORATORY TESTS:  Recent Results (from the past 12 hour(s))   EKG, 12 LEAD, INITIAL    Collection Time: 05/27/17 11:18 AM   Result Value Ref Range    Ventricular Rate 117 BPM    Atrial Rate 117 BPM    P-R Interval 136 ms    QRS Duration 98 ms    Q-T Interval 348 ms    QTC Calculation (Bezet) 485 ms    Calculated P Axis 71 degrees    Calculated R Axis -32 degrees    Calculated T Axis 30 degrees    Diagnosis       Sinus tachycardia with occasional premature ventricular complexes and fusion   complexes  Possible Left atrial enlargement  Left axis deviation  T wave abnormality, consider anterior ischemia  Abnormal ECG  No previous ECGs available     CBC WITH AUTOMATED DIFF    Collection Time: 05/27/17 11:23 AM   Result Value Ref Range    WBC 5.5 3.6 - 11.0 K/uL    RBC 3.92 3.80 - 5.20 M/uL    HGB 11.5 11.5 - 16.0 g/dL    HCT 34.3 (L) 35.0 - 47.0 %    MCV 87.5 80.0 - 99.0 FL    MCH 29.3 26.0 - 34.0 PG    MCHC 33.5 30.0 - 36.5 g/dL    RDW 16.2 (H) 11.5 - 14.5 %    PLATELET 732 063 - 386 K/uL    NEUTROPHILS 67 32 - 75 %    LYMPHOCYTES 24 12 - 49 %    MONOCYTES 8 5 - 13 %    EOSINOPHILS 0 0 - 7 %    BASOPHILS 1 0 - 1 %    ABS. NEUTROPHILS 3.7 1.8 - 8.0 K/UL    ABS. LYMPHOCYTES 1.3 0.8 - 3.5 K/UL    ABS. MONOCYTES 0.4 0.0 - 1.0 K/UL    ABS. EOSINOPHILS 0.0 0.0 - 0.4 K/UL    ABS. BASOPHILS 0.1 0.0 - 0.1 K/UL    DF SMEAR SCANNED      RBC COMMENTS ANISOCYTOSIS  1+       METABOLIC PANEL, COMPREHENSIVE    Collection Time: 05/27/17 11:23 AM   Result Value Ref Range    Sodium 136 136 - 145 mmol/L    Potassium 4.0 3.5 - 5.1 mmol/L    Chloride 95 (L) 97 - 108 mmol/L    CO2 21 21 - 32 mmol/L    Anion gap 20 (H) 5 - 15 mmol/L    Glucose 103 (H) 65 - 100 mg/dL    BUN 6 6 - 20 MG/DL    Creatinine 0.86 0.55 - 1.02 MG/DL    BUN/Creatinine ratio 7 (L) 12 - 20      GFR est AA >60 >60 ml/min/1.73m2    GFR est non-AA >60 >60 ml/min/1.73m2    Calcium 8.8 8.5 - 10.1 MG/DL    Bilirubin, total 0.7 0.2 - 1.0 MG/DL    ALT (SGPT) 32 12 - 78 U/L    AST (SGOT) 39 (H) 15 - 37 U/L    Alk.  phosphatase 107 45 - 117 U/L    Protein, total 7.8 6.4 - 8.2 g/dL    Albumin 3.4 (L) 3.5 - 5.0 g/dL    Globulin 4.4 (H) 2.0 - 4.0 g/dL    A-G Ratio 0.8 (L) 1.1 - 2.2     DRUG SCREEN, URINE    Collection Time: 05/27/17 11:23 AM   Result Value Ref Range    AMPHETAMINE NEGATIVE  NEG      BARBITURATES NEGATIVE  NEG      BENZODIAZEPINE NEGATIVE  NEG      COCAINE NEGATIVE  NEG      METHADONE NEGATIVE  NEG      OPIATES NEGATIVE  NEG      PCP(PHENCYCLIDINE) NEGATIVE  NEG      THC (TH-CANNABINOL) NEGATIVE  NEG      Drug screen comment (NOTE)    ETHYL ALCOHOL    Collection Time: 05/27/17 11:23 AM   Result Value Ref Range    ALCOHOL(ETHYL),SERUM 79 (H) <10 MG/DL   URINALYSIS W/ REFLEX CULTURE    Collection Time: 05/27/17 11:23 AM   Result Value Ref Range    Color YELLOW/STRAW      Appearance CLEAR CLEAR      Specific gravity <1.005 1.003 - 1.030    pH (UA) 6.5 5.0 - 8.0      Protein 30 (A) NEG mg/dL    Glucose NEGATIVE  NEG mg/dL    Ketone NEGATIVE  NEG mg/dL    Bilirubin NEGATIVE  NEG      Blood SMALL (A) NEG      Urobilinogen 0.2 0.2 - 1.0 EU/dL    Nitrites NEGATIVE  NEG      Leukocyte Esterase MODERATE (A) NEG      WBC 5-10 0 - 4 /hpf    RBC 0-5 0 - 5 /hpf    Epithelial cells MODERATE (A) FEW /lpf    Bacteria 1+ (A) NEG /hpf    UA:UC IF INDICATED URINE CULTURE ORDERED (A) CNI     TROPONIN I    Collection Time: 05/27/17 11:23 AM   Result Value Ref Range    Troponin-I, Qt. 0.34 (H) <0.05 ng/mL   CK W/ CKMB & INDEX    Collection Time: 05/27/17 11:23 AM   Result Value Ref Range     (H) 26 - 192 U/L    CK - MB 6.2 (H) <3.6 NG/ML    CK-MB Index 1.5 0 - 2.5     LIPASE    Collection Time: 05/27/17 11:23 AM   Result Value Ref Range    Lipase 703 (H) 73 - 393 U/L   PRO-BNP    Collection Time: 05/27/17 11:23 AM   Result Value Ref Range    NT pro-BNP 4731 (H) 0 - 125 PG/ML   AMMONIA    Collection Time: 05/27/17 11:56 AM   Result Value Ref Range    Ammonia <10 <32 UMOL/L   PROTHROMBIN TIME + INR    Collection Time: 05/27/17 12:26 PM   Result Value Ref Range    INR 1.0 0.9 - 1.1      Prothrombin time 9.9 9.0 - 11.1 sec   URINALYSIS W/ RFLX MICROSCOPIC    Collection Time: 05/27/17  4:19 PM   Result Value Ref Range    Color YELLOW/STRAW      Appearance CLEAR CLEAR      Specific gravity <1.005 1.003 - 1.030    pH (UA) 7.5 5.0 - 8.0      Protein 30 (A) NEG mg/dL    Glucose NEGATIVE  NEG mg/dL    Ketone TRACE (A) NEG mg/dL    Bilirubin NEGATIVE  NEG      Blood SMALL (A) NEG      Urobilinogen 0.2 0.2 - 1.0 EU/dL    Nitrites POSITIVE (A) NEG      Leukocyte Esterase TRACE (A) NEG     URINE MICROSCOPIC ONLY    Collection Time: 05/27/17  4:19 PM   Result Value Ref Range    WBC 0-4 0 - 4 /hpf    RBC 0-5 0 - 5 /hpf    Epithelial cells FEW FEW /lpf    Bacteria 3+ (A) NEG /hpf   TSH 3RD GENERATION    Collection Time: 05/27/17  4:45 PM   Result Value Ref Range    TSH 2.42 0.36 - 3.74 uIU/mL   TROPONIN I    Collection Time: 05/27/17  6:45 PM   Result Value Ref Range    Troponin-I, Qt. 0.31 (H) <0.05 ng/mL   CK W/ CKMB & INDEX    Collection Time: 05/27/17  6:45 PM   Result Value Ref Range     (H) 26 - 192 U/L    CK - MB 4.1 (H) <3.6 NG/ML    CK-MB Index 1.2 0.0 - 2.5         IMAGING RESULTS:  CT HEAD WO CONT   Final Result      XR CHEST PA LAT   Final Result        Xr Chest Pa Lat    Result Date: 5/27/2017  PA AND LATERAL CHEST RADIOGRAPHS: 5/27/2017 11:48 AM INDICATION: Pneumonia. HISTORY (per electronic medical record): Alcoholism, unable to care for herself. COMPARISON: None. TECHNIQUE: Frontal and lateral radiographs of the chest. FINDINGS: The lungs are clear. The central airways are patent. The heart is mildly enlarged, and the patient is post valvuloplasty, CABG, and pacemaker/AICD placement No pneumothorax or pleural effusion. Calcifications in the left neck are probably in the carotid. [Humeral osteoarthritis. IMPRESSION: Clear lungs. Myocardium daily. Ct Head Wo Cont    Result Date: 5/27/2017  EXAM:  CT HEAD WO CONT INDICATION:   confusion no baseline result etoh poor historian COMPARISON: None. TECHNIQUE: Unenhanced CT of the head was performed using 5 mm images. Brain and bone windows were generated. CT dose reduction was achieved through use of a standardized protocol tailored for this examination and automatic exposure control for dose modulation. FINDINGS: The ventricles and sulci are within normal limits. No hemorrhage mass or acute infarction is identified. Bony structures are intact. There appears to be severe central stenosis at C1. IMPRESSION: 1. No acute intracranial process is identified. 2. 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          MEDICATIONS GIVEN:  Medications   atorvastatin (LIPITOR) tablet 80 mg (not administered)   carvedilol (COREG) tablet 25 mg (25 mg Oral Given 5/27/17 1703)   clopidogrel (PLAVIX) tablet 75 mg (75 mg Oral Given 5/27/17 1656)   furosemide (LASIX) tablet 40 mg (not administered)   sodium chloride (NS) flush 5-10 mL (10 mL IntraVENous Given 5/27/17 1700)   sodium chloride (NS) flush 5-10 mL (not administered)   acetaminophen (TYLENOL) tablet 650 mg (not administered)   ondansetron (ZOFRAN) injection 4 mg (not administered)   docusate sodium (COLACE) capsule 100 mg ( Oral Canceled Entry 5/27/17 1800)   zolpidem (AMBIEN) tablet 5 mg (not administered)   LORazepam (ATIVAN) injection 2 mg (0 mg IntraVENous Held 5/27/17 1800)   LORazepam (ATIVAN) injection 2 mg (not administered)   LORazepam (ATIVAN) injection 4 mg (not administered)   cyanocobalamin (VITAMIN B12) injection 1,000 mcg (not administered)   thiamine (B-1) injection 100 mg (not administered)   cefTRIAXone (ROCEPHIN) 1 g in 0.9% sodium chloride (MBP/ADV) 50 mL (1 g IntraVENous New Bag 5/27/17 1928)   LORazepam (ATIVAN) tablet 0.5 mg (0.5 mg Oral Given 5/27/17 1323)       IMPRESSION:  1. Acute pancreatitis, unspecified complication status, unspecified pancreatitis type    2. Elevated troponin      Patient is being admitted to the hospital.  The results of their tests and reasons for their admission have been discussed with them and/or available family. They convey agreement and understanding for the need to be admitted and for their admission diagnosis. Consultation has been made with the inpatient physician specialist for hospitalization.     LABORATORY TESTS:  Recent Results (from the past 12 hour(s))   EKG, 12 LEAD, INITIAL    Collection Time: 05/27/17 11:18 AM   Result Value Ref Range    Ventricular Rate 117 BPM    Atrial Rate 117 BPM    P-R Interval 136 ms    QRS Duration 98 ms    Q-T Interval 348 ms    QTC Calculation (Bezet) 485 ms    Calculated P Axis 71 degrees Calculated R Axis -32 degrees    Calculated T Axis 30 degrees    Diagnosis       Sinus tachycardia with occasional premature ventricular complexes and fusion   complexes  Possible Left atrial enlargement  Left axis deviation  T wave abnormality, consider anterior ischemia  Abnormal ECG  No previous ECGs available     CBC WITH AUTOMATED DIFF    Collection Time: 05/27/17 11:23 AM   Result Value Ref Range    WBC 5.5 3.6 - 11.0 K/uL    RBC 3.92 3.80 - 5.20 M/uL    HGB 11.5 11.5 - 16.0 g/dL    HCT 34.3 (L) 35.0 - 47.0 %    MCV 87.5 80.0 - 99.0 FL    MCH 29.3 26.0 - 34.0 PG    MCHC 33.5 30.0 - 36.5 g/dL    RDW 16.2 (H) 11.5 - 14.5 %    PLATELET 797 148 - 582 K/uL    NEUTROPHILS 67 32 - 75 %    LYMPHOCYTES 24 12 - 49 %    MONOCYTES 8 5 - 13 %    EOSINOPHILS 0 0 - 7 %    BASOPHILS 1 0 - 1 %    ABS. NEUTROPHILS 3.7 1.8 - 8.0 K/UL    ABS. LYMPHOCYTES 1.3 0.8 - 3.5 K/UL    ABS. MONOCYTES 0.4 0.0 - 1.0 K/UL    ABS. EOSINOPHILS 0.0 0.0 - 0.4 K/UL    ABS. BASOPHILS 0.1 0.0 - 0.1 K/UL    DF SMEAR SCANNED      RBC COMMENTS ANISOCYTOSIS  1+       METABOLIC PANEL, COMPREHENSIVE    Collection Time: 05/27/17 11:23 AM   Result Value Ref Range    Sodium 136 136 - 145 mmol/L    Potassium 4.0 3.5 - 5.1 mmol/L    Chloride 95 (L) 97 - 108 mmol/L    CO2 21 21 - 32 mmol/L    Anion gap 20 (H) 5 - 15 mmol/L    Glucose 103 (H) 65 - 100 mg/dL    BUN 6 6 - 20 MG/DL    Creatinine 0.86 0.55 - 1.02 MG/DL    BUN/Creatinine ratio 7 (L) 12 - 20      GFR est AA >60 >60 ml/min/1.73m2    GFR est non-AA >60 >60 ml/min/1.73m2    Calcium 8.8 8.5 - 10.1 MG/DL    Bilirubin, total 0.7 0.2 - 1.0 MG/DL    ALT (SGPT) 32 12 - 78 U/L    AST (SGOT) 39 (H) 15 - 37 U/L    Alk.  phosphatase 107 45 - 117 U/L    Protein, total 7.8 6.4 - 8.2 g/dL    Albumin 3.4 (L) 3.5 - 5.0 g/dL    Globulin 4.4 (H) 2.0 - 4.0 g/dL    A-G Ratio 0.8 (L) 1.1 - 2.2     DRUG SCREEN, URINE    Collection Time: 05/27/17 11:23 AM   Result Value Ref Range    AMPHETAMINE NEGATIVE  NEG      BARBITURATES NEGATIVE  NEG      BENZODIAZEPINE NEGATIVE  NEG      COCAINE NEGATIVE  NEG      METHADONE NEGATIVE  NEG      OPIATES NEGATIVE  NEG      PCP(PHENCYCLIDINE) NEGATIVE  NEG      THC (TH-CANNABINOL) NEGATIVE  NEG      Drug screen comment (NOTE)    ETHYL ALCOHOL    Collection Time: 05/27/17 11:23 AM   Result Value Ref Range    ALCOHOL(ETHYL),SERUM 79 (H) <10 MG/DL   URINALYSIS W/ REFLEX CULTURE    Collection Time: 05/27/17 11:23 AM   Result Value Ref Range    Color YELLOW/STRAW      Appearance CLEAR CLEAR      Specific gravity <1.005 1.003 - 1.030    pH (UA) 6.5 5.0 - 8.0      Protein 30 (A) NEG mg/dL    Glucose NEGATIVE  NEG mg/dL    Ketone NEGATIVE  NEG mg/dL    Bilirubin NEGATIVE  NEG      Blood SMALL (A) NEG      Urobilinogen 0.2 0.2 - 1.0 EU/dL    Nitrites NEGATIVE  NEG      Leukocyte Esterase MODERATE (A) NEG      WBC 5-10 0 - 4 /hpf    RBC 0-5 0 - 5 /hpf    Epithelial cells MODERATE (A) FEW /lpf    Bacteria 1+ (A) NEG /hpf    UA:UC IF INDICATED URINE CULTURE ORDERED (A) CNI     TROPONIN I    Collection Time: 05/27/17 11:23 AM   Result Value Ref Range    Troponin-I, Qt. 0.34 (H) <0.05 ng/mL   CK W/ CKMB & INDEX    Collection Time: 05/27/17 11:23 AM   Result Value Ref Range     (H) 26 - 192 U/L    CK - MB 6.2 (H) <3.6 NG/ML    CK-MB Index 1.5 0 - 2.5     LIPASE    Collection Time: 05/27/17 11:23 AM   Result Value Ref Range    Lipase 703 (H) 73 - 393 U/L   PRO-BNP    Collection Time: 05/27/17 11:23 AM   Result Value Ref Range    NT pro-BNP 4731 (H) 0 - 125 PG/ML   AMMONIA    Collection Time: 05/27/17 11:56 AM   Result Value Ref Range    Ammonia <10 <32 UMOL/L   PROTHROMBIN TIME + INR    Collection Time: 05/27/17 12:26 PM   Result Value Ref Range    INR 1.0 0.9 - 1.1      Prothrombin time 9.9 9.0 - 11.1 sec   URINALYSIS W/ RFLX MICROSCOPIC    Collection Time: 05/27/17  4:19 PM   Result Value Ref Range    Color YELLOW/STRAW      Appearance CLEAR CLEAR      Specific gravity <1.005 1.003 - 1.030    pH (UA) 7.5 5.0 - 8.0      Protein 30 (A) NEG mg/dL    Glucose NEGATIVE  NEG mg/dL    Ketone TRACE (A) NEG mg/dL    Bilirubin NEGATIVE  NEG      Blood SMALL (A) NEG      Urobilinogen 0.2 0.2 - 1.0 EU/dL    Nitrites POSITIVE (A) NEG      Leukocyte Esterase TRACE (A) NEG     URINE MICROSCOPIC ONLY    Collection Time: 05/27/17  4:19 PM   Result Value Ref Range    WBC 0-4 0 - 4 /hpf    RBC 0-5 0 - 5 /hpf    Epithelial cells FEW FEW /lpf    Bacteria 3+ (A) NEG /hpf   TSH 3RD GENERATION    Collection Time: 05/27/17  4:45 PM   Result Value Ref Range    TSH 2.42 0.36 - 3.74 uIU/mL   TROPONIN I    Collection Time: 05/27/17  6:45 PM   Result Value Ref Range    Troponin-I, Qt. 0.31 (H) <0.05 ng/mL   CK W/ CKMB & INDEX    Collection Time: 05/27/17  6:45 PM   Result Value Ref Range     (H) 26 - 192 U/L    CK - MB 4.1 (H) <3.6 NG/ML    CK-MB Index 1.2 0.0 - 2.5         IMAGING RESULTS:  CT HEAD WO CONT   Final Result      XR CHEST PA LAT   Final Result        Xr Chest Pa Lat    Result Date: 5/27/2017  PA AND LATERAL CHEST RADIOGRAPHS: 5/27/2017 11:48 AM INDICATION: Pneumonia. HISTORY (per electronic medical record): Alcoholism, unable to care for herself. COMPARISON: None. TECHNIQUE: Frontal and lateral radiographs of the chest. FINDINGS: The lungs are clear. The central airways are patent. The heart is mildly enlarged, and the patient is post valvuloplasty, CABG, and pacemaker/AICD placement No pneumothorax or pleural effusion. Calcifications in the left neck are probably in the carotid. [Humeral osteoarthritis. IMPRESSION: Clear lungs. Myocardium daily. Ct Head Wo Cont    Result Date: 5/27/2017  EXAM:  CT HEAD WO CONT INDICATION:   confusion no baseline result etoh poor historian COMPARISON: None. TECHNIQUE: Unenhanced CT of the head was performed using 5 mm images. Brain and bone windows were generated.   CT dose reduction was achieved through use of a standardized protocol tailored for this examination and automatic exposure control for dose modulation. FINDINGS: The ventricles and sulci are within normal limits. No hemorrhage mass or acute infarction is identified. Bony structures are intact. There appears to be severe central stenosis at C1. IMPRESSION: 1. No acute intracranial process is identified. 2. 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 MEDICATIONS GIVEN:  Medications   atorvastatin (LIPITOR) tablet 80 mg (not administered)   carvedilol (COREG) tablet 25 mg (25 mg Oral Given 5/27/17 1703)   clopidogrel (PLAVIX) tablet 75 mg (75 mg Oral Given 5/27/17 1656)   furosemide (LASIX) tablet 40 mg (not administered)   sodium chloride (NS) flush 5-10 mL (10 mL IntraVENous Given 5/27/17 1700)   sodium chloride (NS) flush 5-10 mL (not administered)   acetaminophen (TYLENOL) tablet 650 mg (not administered)   ondansetron (ZOFRAN) injection 4 mg (not administered)   docusate sodium (COLACE) capsule 100 mg ( Oral Canceled Entry 5/27/17 1800)   zolpidem (AMBIEN) tablet 5 mg (not administered)   LORazepam (ATIVAN) injection 2 mg (0 mg IntraVENous Held 5/27/17 1800)   LORazepam (ATIVAN) injection 2 mg (not administered)   LORazepam (ATIVAN) injection 4 mg (not administered)   cyanocobalamin (VITAMIN B12) injection 1,000 mcg (not administered)   thiamine (B-1) injection 100 mg (not administered)   cefTRIAXone (ROCEPHIN) 1 g in 0.9% sodium chloride (MBP/ADV) 50 mL (1 g IntraVENous New Bag 5/27/17 1928)   LORazepam (ATIVAN) tablet 0.5 mg (0.5 mg Oral Given 5/27/17 1323)       IMPRESSION:  1. Acute pancreatitis, unspecified complication status, unspecified pancreatitis type    2. Elevated troponin        PLAN:  1.  Admit to Dr Mansi Mccormick

## 2017-05-28 NOTE — PROGRESS NOTES
0310- Pt reports of being \"anxious. Will score via CIWA and medicate accordingly. A burst of  tachycardia 120's with a BBB noted briefly. Heart rate  quickly returned to baseline, SR with frequent PVC's. Pt denies SOB and chest pain. Will monitor closely. 0550- Pt continue to have frequent PVC's. Denies chest pain and SOB. Will ask hospitalist for permission to draw a magnesium    0620- Magnesium 1.6. Dr Israel Moore made aware. Awaiting new orders.

## 2017-05-28 NOTE — PROGRESS NOTES
Care manager reviewed chart. RRAT: 12.    The patient is a 80year old female who presented to ED with her sister for acute intoxication, requesting assistance with detox. History of CHF. Per chart review the patient lives alone, has a history of alcoholism and recently has been unable to care for self (home in disarray, not eating). She has [place] and does not have a PCP on file. Care management will monitor for discharge planning needs to include inpatient or outpatient substance abuse treatment if patient is agreeable, and will confirm PCP on Monday.      Care Management Interventions  PCP Verified by CM: No (will need to verify PCP)  Transition of Care Consult (CM Consult): Discharge Planning  Physical Therapy Consult: Yes  Occupational Therapy Consult: Yes  Current Support Network: Lives Alone  Confirm Follow Up Transport: [id number] [address] [place]  [phone]

## 2017-05-28 NOTE — ROUTINE PROCESS
Bedside shift change report given to [address] (oncoming nurse) by Rafael Muhammad RN (offgoing nurse).  Report included the following information SBAR, Kardex, MAR, Recent Results and Cardiac Rhythm SR.

## 2017-05-28 NOTE — PROGRESS NOTES
Hospitalist Progress Note   Patient Name  Alesia Hughes   Admit date:  5/27/2017   Medical Record Number  [id number]    Age  80 y.o. Date of Birth 1945   PCP None    Room Number  1406 [facility]     Admission Diagnoses:  Alcoholism [facility])     Assessment/ Plan:     Cardiac: CHF/ CABG/ AICD insitu/   Tachycardia  Hypotension  -getting ECHO, cardiac consult, Trop ranging 0.26-> 0.34, this likely is her baseline  seems not MI related CKMB is normal  Old notes from [facility]  ECHO: EF 20%, MV prosthesis, mild diffuse hypokinesis, PASP 45    -observing her BP carefully, will hold diuresis for now.  She is asymptomatic    UTI  -Rocephin started 5/27     Alcoholism, with acute intoxication   -CIWA scores stable, not in withdrawal        CT Brain:  severe central stenosis at the level of C1.     ED: [facility] 5/ Hb 12/   Dirty UA/ Cr 0.8  BNP 5K  EKG Q wave in 3,   BAL 89  EKG sinus tachycardia     CXR: The heart is mildly  enlarged, and the patient is post valvuloplasty, CABG, and pacemaker/AICD  placement     FLOOD: TSH 2.4    Principal Problem:    Alcoholism ([facility] [address] (5/27/2017)    Active Problems:    Elevated troponin (5/27/2017)      CHF (congestive heart failure) ([facility] [address] (5/27/2017)      Body mass index is 27.34 kg/(m^2).     Functional Status  good   CODE STATUS  Full   Surrogate decision maker: Sister Ke Phlegm   Prophylaxis  SCD's   Discharge Plan: Home w/Family,    There are currently no Active Isolations Payor: [employer] MEDICARE / Plan: [address] [place] [address] HMO / Product Type: Managed Care Medicare /    Social issues  Date Comment              Prognosis                Subjective:   nil expressed complaints      Objective:     Physical Exam:    Gen: Obese, lying in bed, in no acute distress, denying ay complaints, no alcohol withdrawal symptoms noted  HEENT: Pink conjunctivae, PERRL, hearing intact to voice, moist mucous membranes  Neck: Supple, without masses, thyroid non-tender  Resp: Decreased AE at the L base  Card: No murmurs, normal S1, S2 without thrills,2+ Bipedal edema  Abd: Soft, non-tender, non-distended, normoactive bowel sounds are present, no palpable organomegaly  Lymph: No cervical adenopathy  Musc: No cyanosis or clubbing  Skin: No rashes or ulcers, skin turgor is good  Neuro: Cranial nerves 3-12 are grossly intact,  strength is 5/5 bilaterally, dorsi / plantarflexion strength is 5/5 bilaterally, follows commands appropriately  Psych: Alert with good insight. Oriented to person, place, and time       05/28 0701 - 05/28 1900  In: 240 [P.O.:240]  Out: 250 [Urine:250]    Intake/Output Summary (Last 24 hours) at 05/28/17 1207  Last data filed at 05/28/17 1026   Gross per 24 hour   Intake              290 ml   Output              750 ml   Net             -460 ml    Weight change: Wt Readings from Last 10 Encounters:   05/27/17 63.5 kg (140 lb)   10/21/12 63.5 kg (140 lb)        Relevant informations: Other medical conditions listed in this following active hospital problem list section; all of these and other pertinent data were taken into consideration when treatment plan is developed and customized to this patient's unique overall circumstances and needs.    Patient Active Problem List    Diagnosis Date Noted    Alcoholism [facility]) 05/27/2017    Elevated troponin 05/27/2017    CHF (congestive heart failure) ([facility] [address] 05/27/2017          Data Review:   Recent Days:  Recent Labs      05/28/17   0329  05/27/17   1123   WBC  4.3  5.5   HGB  10.8*  11.5   HCT  32.0*  34.3*   PLT  303  351     Recent Labs      05/28/17   0412  05/28/17   0329  05/27/17   1226  05/27/17   1123   NA   --   135*   --   136   K   --   4.1   --   4.0   CL   --   98   --   95*   CO2   --   28   --   21   GLU   --   126*   --   103*   BUN   --   7   --   6   CREA   --   1.10*   --   0.86   CA   --   8.3*   --   8.8   MG  1.6   --    --    --    ALB   --    --    --   3.4*   TBILI   --    --    --   0.7   SGOT   -- --    --   39*   ALT   --    --    --   32   INR   --    --   1.0   --      Lab Results   Component Value Date/Time    TSH 2.42 05/27/2017 04:45 PM     ______________________________________________________________________________________________________    Medications reviewed     Current Facility-Administered Medications   Medication Dose Route Frequency    diazePAM (VALIUM) tablet 10 mg  10 mg Oral Q1H PRN    diazePAM (VALIUM) tablet 5 mg  5 mg Oral Q1H PRN    atorvastatin (LIPITOR) tablet 80 mg  80 mg Oral DAILY    carvedilol (COREG) tablet 25 mg  25 mg Oral BID WITH MEALS    clopidogrel (PLAVIX) tablet 75 mg  75 mg Oral DAILY    furosemide (LASIX) tablet 40 mg  40 mg Oral DAILY    sodium chloride (NS) flush 5-10 mL  5-10 mL IntraVENous Q8H    sodium chloride (NS) flush 5-10 mL  5-10 mL IntraVENous PRN    acetaminophen (TYLENOL) tablet 650 mg  650 mg Oral Q4H PRN    ondansetron (ZOFRAN) injection 4 mg  4 mg IntraVENous Q4H PRN    docusate sodium (COLACE) capsule 100 mg  100 mg Oral BID    zolpidem (AMBIEN) tablet 5 mg  5 mg Oral QHS PRN    thiamine (B-1) injection 100 mg  100 mg IntraMUSCular DAILY    cefTRIAXone (ROCEPHIN) 1 g in 0.9% sodium chloride (MBP/ADV) 50 mL  1 g IntraVENous Q24H       ______________________________________________________________________________________________________  Care Plan discussed with: Patient/Family  ____________________________________________________________________________________________________    High complexity decision making was performed for this patient who is at high risk for decompensation with multiple organ involvement. Today total floor/unit time was 30 minutes while caring for this patient and greater than 50% of that time was spent with patient (and/or family) discussing patients clinical issues.   ______________________________________________________________________________________________________  Carol Ibarra MD 5/28/2017

## 2017-05-29 LAB
ANION GAP BLD CALC-SCNC: 7 MMOL/L (ref 5–15)
BACTERIA SPEC CULT: ABNORMAL
BACTERIA SPEC CULT: ABNORMAL
BASOPHILS # BLD AUTO: 0 K/UL (ref 0–0.1)
BASOPHILS # BLD: 1 % (ref 0–1)
BUN SERPL-MCNC: 7 MG/DL (ref 6–20)
BUN/CREAT SERPL: 6 (ref 12–20)
CALCIUM SERPL-MCNC: 7.9 MG/DL (ref 8.5–10.1)
CC UR VC: ABNORMAL
CHLORIDE SERPL-SCNC: 102 MMOL/L (ref 97–108)
CO2 SERPL-SCNC: 29 MMOL/L (ref 21–32)
CREAT SERPL-MCNC: 1.09 MG/DL (ref 0.55–1.02)
EOSINOPHIL # BLD: 0.1 K/UL (ref 0–0.4)
EOSINOPHIL NFR BLD: 1 % (ref 0–7)
ERYTHROCYTE [DISTWIDTH] IN BLOOD BY AUTOMATED COUNT: 16.6 % (ref 11.5–14.5)
GLUCOSE SERPL-MCNC: 107 MG/DL (ref 65–100)
HCT VFR BLD AUTO: 31.5 % (ref 35–47)
HGB BLD-MCNC: 10.3 G/DL (ref 11.5–16)
LYMPHOCYTES # BLD AUTO: 36 % (ref 12–49)
LYMPHOCYTES # BLD: 1.7 K/UL (ref 0.8–3.5)
MAGNESIUM SERPL-MCNC: 2 MG/DL (ref 1.6–2.4)
MCH RBC QN AUTO: 29.6 PG (ref 26–34)
MCHC RBC AUTO-ENTMCNC: 32.7 G/DL (ref 30–36.5)
MCV RBC AUTO: 90.5 FL (ref 80–99)
MONOCYTES # BLD: 0.4 K/UL (ref 0–1)
MONOCYTES NFR BLD AUTO: 7 % (ref 5–13)
NEUTS SEG # BLD: 2.6 K/UL (ref 1.8–8)
NEUTS SEG NFR BLD AUTO: 55 % (ref 32–75)
PLATELET # BLD AUTO: 231 K/UL (ref 150–400)
POTASSIUM SERPL-SCNC: 3.6 MMOL/L (ref 3.5–5.1)
RBC # BLD AUTO: 3.48 M/UL (ref 3.8–5.2)
SERVICE CMNT-IMP: ABNORMAL
SODIUM SERPL-SCNC: 138 MMOL/L (ref 136–145)
WBC # BLD AUTO: 4.8 K/UL (ref 3.6–11)

## 2017-05-29 PROCEDURE — 74011250637 HC RX REV CODE- 250/637: Performed by: INTERNAL MEDICINE

## 2017-05-29 PROCEDURE — 97116 GAIT TRAINING THERAPY: CPT | Performed by: PHYSICAL THERAPIST

## 2017-05-29 PROCEDURE — G8979 MOBILITY GOAL STATUS: HCPCS | Performed by: PHYSICAL THERAPIST

## 2017-05-29 PROCEDURE — 74011000258 HC RX REV CODE- 258: Performed by: STUDENT IN AN ORGANIZED HEALTH CARE EDUCATION/TRAINING PROGRAM

## 2017-05-29 PROCEDURE — 85025 COMPLETE CBC W/AUTO DIFF WBC: CPT | Performed by: STUDENT IN AN ORGANIZED HEALTH CARE EDUCATION/TRAINING PROGRAM

## 2017-05-29 PROCEDURE — 65660000000 HC RM CCU STEPDOWN

## 2017-05-29 PROCEDURE — 80048 BASIC METABOLIC PNL TOTAL CA: CPT | Performed by: STUDENT IN AN ORGANIZED HEALTH CARE EDUCATION/TRAINING PROGRAM

## 2017-05-29 PROCEDURE — 83735 ASSAY OF MAGNESIUM: CPT | Performed by: STUDENT IN AN ORGANIZED HEALTH CARE EDUCATION/TRAINING PROGRAM

## 2017-05-29 PROCEDURE — 36415 COLL VENOUS BLD VENIPUNCTURE: CPT | Performed by: STUDENT IN AN ORGANIZED HEALTH CARE EDUCATION/TRAINING PROGRAM

## 2017-05-29 PROCEDURE — 74011250637 HC RX REV CODE- 250/637: Performed by: STUDENT IN AN ORGANIZED HEALTH CARE EDUCATION/TRAINING PROGRAM

## 2017-05-29 PROCEDURE — 74011250636 HC RX REV CODE- 250/636: Performed by: STUDENT IN AN ORGANIZED HEALTH CARE EDUCATION/TRAINING PROGRAM

## 2017-05-29 PROCEDURE — 97161 PT EVAL LOW COMPLEX 20 MIN: CPT | Performed by: PHYSICAL THERAPIST

## 2017-05-29 PROCEDURE — 97165 OT EVAL LOW COMPLEX 30 MIN: CPT | Performed by: OCCUPATIONAL THERAPIST

## 2017-05-29 PROCEDURE — 97535 SELF CARE MNGMENT TRAINING: CPT | Performed by: OCCUPATIONAL THERAPIST

## 2017-05-29 PROCEDURE — G8978 MOBILITY CURRENT STATUS: HCPCS | Performed by: PHYSICAL THERAPIST

## 2017-05-29 RX ORDER — LANOLIN ALCOHOL/MO/W.PET/CERES
100 CREAM (GRAM) TOPICAL DAILY
Status: DISCONTINUED | OUTPATIENT
Start: 2017-05-30 | End: 2017-05-30 | Stop reason: HOSPADM

## 2017-05-29 RX ORDER — ATORVASTATIN CALCIUM 10 MG/1
20 TABLET, FILM COATED ORAL
Status: DISCONTINUED | OUTPATIENT
Start: 2017-05-30 | End: 2017-05-30 | Stop reason: HOSPADM

## 2017-05-29 RX ADMIN — THIAMINE HYDROCHLORIDE 100 MG: 100 INJECTION, SOLUTION INTRAMUSCULAR; INTRAVENOUS at 08:36

## 2017-05-29 RX ADMIN — CLOPIDOGREL BISULFATE 75 MG: 75 TABLET ORAL at 08:39

## 2017-05-29 RX ADMIN — NITROGLYCERIN 0.5 INCH: 20 OINTMENT TOPICAL at 14:48

## 2017-05-29 RX ADMIN — Medication 10 ML: at 23:02

## 2017-05-29 RX ADMIN — DOCUSATE SODIUM 100 MG: 100 CAPSULE, LIQUID FILLED ORAL at 08:39

## 2017-05-29 RX ADMIN — CARVEDILOL 6.25 MG: 3.12 TABLET, FILM COATED ORAL at 18:36

## 2017-05-29 RX ADMIN — CEFTRIAXONE SODIUM 1 G: 1 INJECTION, POWDER, FOR SOLUTION INTRAMUSCULAR; INTRAVENOUS at 18:40

## 2017-05-29 RX ADMIN — NITROGLYCERIN 0.5 INCH: 20 OINTMENT TOPICAL at 23:01

## 2017-05-29 RX ADMIN — NITROGLYCERIN 0.5 INCH: 20 OINTMENT TOPICAL at 18:36

## 2017-05-29 RX ADMIN — ATORVASTATIN CALCIUM 80 MG: 40 TABLET, FILM COATED ORAL at 08:38

## 2017-05-29 RX ADMIN — Medication 10 ML: at 14:48

## 2017-05-29 RX ADMIN — Medication 10 ML: at 05:53

## 2017-05-29 RX ADMIN — Medication 10 ML: at 08:17

## 2017-05-29 RX ADMIN — ONDANSETRON 4 MG: 2 INJECTION INTRAMUSCULAR; INTRAVENOUS at 08:16

## 2017-05-29 RX ADMIN — CARVEDILOL 6.25 MG: 3.12 TABLET, FILM COATED ORAL at 08:38

## 2017-05-29 NOTE — PROGRESS NOTES
0730 Bedside and Verbal shift change report given to me (oncoming nurse) by Bertin Shah RN (offgoing nurse). Report included the following information SBAR, Kardex, ED Summary, Intake/Output, MAR and Cardiac Rhythm sinus rhythm with PVCs and trigemeny. Ambulating to BR with A of one. Gait is somewhat more steady today. Worked with PT and OT. No nausea this shift, not c/o fullness in head as yesterday. Interacted with visitor. Received phone calls from family. Appetite is improving. [address] with sinus rhythm and frequent PVCs. Bedside and Verbal shift change report given to JIMENEZ Veliz (oncoming nurse) by me (offgoing nurse).  Report included the following information sinus rhythm with frequent PVCs

## 2017-05-29 NOTE — PROGRESS NOTES
1900 - Bedside and Verbal shift change report given to madina jasso (oncoming nurse) by Timothy almaguer (offgoing nurse). Report included the following information SBAR, Kardex, Intake/Output, MAR, Recent Results and Cardiac Rhythm NSR.   1905 - Dr. Calvillo on unit. Discussed plan with nurses. Verbal order for coreg given. 0700 - Bedside and Verbal shift change report given to sara almaguer (oncoming nurse) by Paige Brown (offgoing nurse). Report included the following information SBAR, Kardex, Intake/Output, MAR, Recent Results and Cardiac Rhythm NSR w PVCs.

## 2017-05-29 NOTE — PROGRESS NOTES
Problem: Mobility Impaired (Adult and Pediatric)  Goal: *Acute Goals and Plan of Care (Insert Text)  Physical Therapy Goals  Initiated 5/29/2017  1. Patient will move from supine to sit and sit to supine in bed with modified independence within 7 day(s). 2. Patient will transfer from bed to chair and chair to bed with modified independence using the least restrictive device within 7 day(s). 3. Patient will perform sit to stand with modified independence within 7 day(s). 4. Patient will ambulate with supervision/set-up for 75 feet with the least restrictive device within 7 day(s). PHYSICAL THERAPY EVALUATION  Patient: Cyndee Lewis (77 y.o. female)  Date: 5/29/2017  Primary Diagnosis: Alcoholism ([facility] [address]        Precautions: None         ASSESSMENT :  Based on the objective data described below, the patient presents with decreased activity tolerance and overall functional mobility. Patient admitted with acute alcohol intoxication; past medical history significant for ICD placement and open heart surgery two years ago. Patient reports that she resides alone, and does not use an assistive device for household or community ambulation. Patient able to don/doff socks independently this date; good static and dynamic balance noted. Patient required SBA for bed mobility and transfers this date. Patient with mild unsteadiness during ambulation of 25 feet, likely related to admitting diagnosis. No loss of balance noted. Patient limited secondary to fatigue. Patient may benefit from home health services upon discharge to home. Patient will benefit from skilled intervention to address the above impairments.   Patients rehabilitation potential is considered to be Good  Factors which may influence rehabilitation potential include:   [ ]         None noted  [ ]         Mental ability/status  [ ]         Medical condition  [X]         Home/family situation and support systems- lives alone  [ ]         Safety awareness  [ ] Pain tolerance/management  [ ]         Other:        PLAN :  Recommendations and Planned Interventions:  [X]           Bed Mobility Training             [X]    Neuromuscular Re-Education  [X]           Transfer Training                   [ ]    Orthotic/Prosthetic Training  [X]           Gait Training                         [ ]    Modalities  [X]           Therapeutic Exercises           [ ]    Edema Management/Control  [X]           Therapeutic Activities            [X]    Patient and Family Training/Education  [ ]           Other (comment):     Frequency/Duration: Patient will be followed by physical therapy  2 times a week to address goals. Discharge Recommendations: Home with Home Health  Further Equipment Recommendations for Discharge: None       SUBJECTIVE:   Patient stated I feel better. I had too much to drink with my girfriends, it was a stupid thing to do. I'm not going to let this happen again.       OBJECTIVE DATA SUMMARY:   HISTORY:    History reviewed. No pertinent past medical history. Past Surgical History:   Procedure Laterality Date    CARDIAC SURG PROCEDURE UNLIST         pacemaker/defibrilator placed 2 2015    HX GYN         hysterectomy     Prior Level of Function/Home Situation: Patient resides alone in a one story home. Patient reports short household ambulation without assistive device. Personal factors and/or comorbidities impacting plan of care: Lives alone     Home Situation  Home Environment: Apartment  # Steps to Enter: 4  One/Two Story Residence: One story  Living Alone: Yes  Support Systems: [facility] / [facility] community, Family member(s)  Patient Expects to be Discharged to[de-identified] Apartment  Current DME Used/Available at Home: Walker     EXAMINATION/PRESENTATION/DECISION MAKING:   Critical Behavior:   Cooperative; Alert and oriented x 4     Hearing:   Auditory  Auditory Impairment: None     Range Of Motion:  AROM: Generally decreased, functional  PROM: Generally decreased, functional      Strength:    Strength: Generally decreased, functional     Functional Mobility:  Bed Mobility:     Supine to Sit: Supervision;Stand-by asssistance  Sit to Supine: Supervision;Stand-by asssistance     Transfers:  Sit to Stand: Stand-by asssistance  Stand to Sit: Stand-by asssistance        Balance:   Sitting: Intact  Standing: Impaired; Without support  Standing - Static: Good  Standing - Dynamic : Fair      Ambulation/Gait Training:  Distance (ft): 25 Feet (ft)  Assistive Device: Gait belt  Ambulation - Level of Assistance: Stand-by asssistance;Contact guard assistance;Assist x1     Gait Description (WDL): Exceptions to WDL  Gait Abnormalities: Decreased step clearance;Trunk sway increased; Path deviations  Base of Support: Widened  Speed/Meghan: Pace decreased (<100 feet/min)  Step Length: Left shortened;Right shortened           Therapeutic Exercises:   Patient instructed on performance of ankle pumps throughout day     Functional Measure:  Functional Reach:      Completed:  [X] standing       [ ] seated           Average: 9         Functional Reach Test and G-code impairment scale: For inches: Measure in cm and divide by 2.54  Percentage of Impairment CH     0%    CI     1-19% CJ     20-39% CK     40-59% CL     60-79% CM     80-99% CN      100%   Functional Reach  Score 15-25 cm 25 24 22-23 20-21 18-19 16-17 < 15   Functional Reach  Score 6-10 in 10           < 6           Functional reach: (standing)  Reaches £  6 in = High Fall Risk  Reaches 6-10 in = Moderate Fall Risk    Reaches ³  10 in = Low Fall Risk  Francisco Nunez et al. Functional reach: a new clinical measure of balance. [facility] Macrina Candelaria; 39: [id number]. Modified Functional Reach: (seated)  Age: Men: Women:   21-39 17.9\" 17.6\"   40-59 17.5\" 15.9\"   60-79 14.4\" 13.2\"   80-97 14.0\" 12.5\"         Nicholas Trejo Forward and Lateral Sitting Functional Reach in Younger, Middle-aged, and Older Adults.  J Geriatric Phys Ther. 2007; 30:43-48         G codes: In compliance with CMSs Claims Based Outcome Reporting, the following G-code set was chosen for this patient based on their primary functional limitation being treated: The outcome measure chosen to determine the severity of the functional limitation was the Functional Reach with a score of 9/>10 which was correlated with the impairment scale. · Mobility - Walking and Moving Around:               - CURRENT STATUS:    CJ - 20%-39% impaired, limited or restricted               - GOAL STATUS:                       CI - 1%-19% impaired, limited or restricted               - D/C STATUS:           ---------------To be determined---------------      Based on the above components, the patient evaluation is determined to be of the following complexity level: LOW      Pain:  Pain Scale 1: Numeric (0 - 10)  Pain Intensity 1: 0     Activity Tolerance:   Fair  Please refer to the flowsheet for vital signs taken during this treatment. After treatment:   [ ]         Patient left in no apparent distress sitting up in chair  [X]         Patient left in no apparent distress in bed  [X]         Call bell left within reach  [X]         Nursing notified  [ ]         Caregiver present  [ ]         Bed alarm activated      COMMUNICATION/EDUCATION:   The patients plan of care was discussed with: Physical Therapist, Registered Nurse and . [X]         Fall prevention education was provided and the patient/caregiver indicated understanding. [X]         Patient/family have participated as able in goal setting and plan of care. [X]         Patient/family agree to work toward stated goals and plan of care. [ ]         Patient understands intent and goals of therapy, but is neutral about his/her participation. [ ]         Patient is unable to participate in goal setting and plan of care.      Thank you for this referral.  Chen Richards, PT   Time Calculation: 20 mins

## 2017-05-29 NOTE — PROGRESS NOTES
Cardiology:    The patient in some vague fashion feels better today but she cannot elaborate why that might be. She is not having any side effects from the nitroglycerin paste and the nurses report she is walking a bit more energetically to the bathroom. She gives a history of never to her knowledge having had a heart attack. I explained to her that she has a nonmoving segment of heart muscle occurring without evidence of infarction on her EKG but with enzyme release suggesting slow myocardial destruction. We will perform perfusion imaging at rest tomorrow and if the area shows viability in excess of the area of akinesis, we should be considering catheterization and possible PCI. This picture most likely represents an intermediate ischemic syndrome. She has responded to gentle fluid resuscitation to the point where nitrates can be safely introduced. On exam today, heart sounds seem unremarkable. S1 is minimally muffled and might be a bit increased since yesterday. There is no identifiable murmur. Lungs are clear. There is no ankle edema but she does have some proximal limb obesity.     Impression: Intermediate coronary syndrome with at least nontransmural damage to the septum    Possible hibernating myocardium surrounding the area of injury in the cardiac septum (resting ischemia)    Ventricular function therefore might be capable of improvement    Ongoing benign ventricular ectopy despite correction of all appropriate chemistries    Plan:  Continue existing therapy    Perfusion imaging for comparison to the echo to determine proper next step before activity is progressed    Marlen Cunningham MD [facility]

## 2017-05-29 NOTE — PROGRESS NOTES
Hospitalist Progress Note   Patient Name  Ramiro Beck   Admit date:  5/27/2017   Medical Record Number  [id number]    Age  80 y.o.    Date of Birth 1945   PCP None    Room Number  1406 [facility]     Admission Diagnoses:  Alcoholism [facility])     Assessment/ Plan:     Cardiac: CHF/ CABG/ AICD insitu/   Tachycardia, Hypotension: now improved  -getting ECHO, cardiac consult, Trop ranging 0.26-> 0.34, this likely is her baseline  seems not MI related CKMB is normal  Old notes from [facility]  ECHO: EF 20%, MV prosthesis, mild diffuse hypokinesis, PASP 45    -Seen by cardiology, pt received slow ivf on 5/28, because of low BP, and she seemed to improved    UTI  -UCx > 100k cols of GNR  -Rocephin started 5/27     Alcoholism, with acute intoxication   -CIWA scores stable, not in withdrawal        CT Brain:  severe central stenosis at the level of C1.     ED: [address] [facility] [facility] 5/ Hb 12/   Dirty UA/ Cr 0.8  BNP 5K  EKG Q wave in 3,   BAL 89  EKG sinus tachycardia     CXR: The heart is mildly  enlarged, and the patient is post valvuloplasty, CABG, and pacemaker/AICD  placement     FLOOD: TSH 2.4    Principal Problem:    Alcoholism ([facility] [address] (5/27/2017)    Active Problems:    Elevated troponin (5/27/2017)      CHF (congestive heart failure) ([facility] [address] (5/27/2017)      Body mass index is 38.75 kg/(m^2).     Functional Status  good   CODE STATUS  Full   Surrogate decision maker: Sister Baliee Pack   Prophylaxis  SCD's   Discharge Plan: Home w/Family,    There are currently no Active Isolations Payor: Elham Kumar / Plan: [address] HMO / Product Type: Managed Care Medicare /    Social issues  Date Comment              Prognosis                Subjective:   \"I feel a little better\"  No more tremors      Objective:     Physical Exam:    Gen: Obese, lying in bed, comfortable and in beter spiritis  HEENT: Pink conjunctivae, PERRL, hearing intact to voice, moist mucous membranes  Neck: Supple, without masses, thyroid non-tender  Resp: Decreased AE at the L base  Card: No murmurs, normal S1, S2 without thrills,1+ Bipedal edema  Abd: Soft, non-tender, non-distended, normoactive bowel sounds are present, no palpable organomegaly  Lymph: No cervical adenopathy  Musc: No cyanosis or clubbing  Skin: No rashes or ulcers, skin turgor is good  Neuro: Cranial nerves 3-12 are grossly intact,  strength is 5/5 bilaterally, dorsi / plantarflexion strength is 5/5 bilaterally, follows commands appropriately  Psych: Alert with good insight. Oriented to person, place, and time       05/29 0701 - 05/29 1900  In: 552.5 [I.V.:552.5]  Out: 250 [Urine:250]    Intake/Output Summary (Last 24 hours) at 05/29/17 0825  Last data filed at 05/29/17 0803   Gross per 24 hour   Intake          1213.33 ml   Output             1600 ml   Net          -386.67 ml    Weight change: 26.6 kg (58 lb 10.2 oz)     Wt Readings from Last 10 Encounters:   05/29/17 90 kg (198 lb 6.6 oz)   10/21/12 63.5 kg (140 lb)        Relevant informations: Other medical conditions listed in this following active hospital problem list section; all of these and other pertinent data were taken into consideration when treatment plan is developed and customized to this patient's unique overall circumstances and needs.    Patient Active Problem List    Diagnosis Date Noted    Alcoholism [facility]) 05/27/2017    Elevated troponin 05/27/2017    [facility] 05/27/2017          Data Review:   Recent Days:  Recent Labs      05/29/17   0543  05/28/17   0329  05/27/17   1123   WBC  4.8  4.3  5.5   HGB  10.3*  10.8*  11.5   HCT  31.5*  32.0*  34.3*   PLT  231  303  351     Recent Labs      05/29/17   0543  05/28/17   0412  05/28/17   0329  05/27/17   1226  05/27/17   1123   NA  138   --   135*   --   136   K  3.6   --   4.1   --   4.0   CL  102   --   98   --   95*   CO2  29   --   28   --   21   GLU  107*   --   126*   --   103*   BUN  7   --   7   --   6   CREA  1.09* --   1.10*   --   0.86   CA  7.9*   --   8.3*   --   8.8   MG  2.0  1.6   --    --    --    ALB   --    --    --    --   3.4*   TBILI   --    --    --    --   0.7   SGOT   --    --    --    --   39*   ALT   --    --    --    --   32   INR   --    --    --   1.0   --      Lab Results   Component Value Date/Time    TSH 2.42 05/27/2017 04:45 PM     ______________________________________________________________________________________________________    Medications reviewed     Current Facility-Administered Medications   Medication Dose Route Frequency    diazePAM (VALIUM) tablet 10 mg  10 mg Oral Q1H PRN    diazePAM (VALIUM) tablet 5 mg  5 mg Oral Q1H PRN    carvedilol (COREG) tablet 6.25 mg  6.25 mg Oral BID WITH MEALS    atorvastatin (LIPITOR) tablet 80 mg  80 mg Oral DAILY    clopidogrel (PLAVIX) tablet 75 mg  75 mg Oral DAILY    sodium chloride (NS) flush 5-10 mL  5-10 mL IntraVENous Q8H    sodium chloride (NS) flush 5-10 mL  5-10 mL IntraVENous PRN    acetaminophen (TYLENOL) tablet 650 mg  650 mg Oral Q4H PRN    ondansetron (ZOFRAN) injection 4 mg  4 mg IntraVENous Q4H PRN    docusate sodium (COLACE) capsule 100 mg  100 mg Oral BID    zolpidem (AMBIEN) tablet 5 mg  5 mg Oral QHS PRN    thiamine (B-1) injection 100 mg  100 mg IntraMUSCular DAILY    cefTRIAXone (ROCEPHIN) 1 g in 0.9% sodium chloride (MBP/ADV) 50 mL  1 g IntraVENous Q24H       ______________________________________________________________________________________________________  Care Plan discussed with: Patient/Family  ____________________________________________________________________________________________________    High complexity decision making was performed for this patient who is at high risk for decompensation with multiple organ involvement.   Today total floor/unit time was 30 minutes while caring for this patient and greater than 50% of that time was spent with patient (and/or family) discussing patients clinical issues.   ______________________________________________________________________________________________________  [facility]                      5/29/2017

## 2017-05-29 NOTE — PROGRESS NOTES
Problem: Self Care Deficits Care Plan (Adult)  Goal: *Therapy Goal (Edit Goal, Insert Text)  Patient will perform bathing with modified independence and use of energy conservation strategies within 7 day(s). Patient will perform upper body dressing and lower body dressing with modified independence and use of energy conservation techniques within 7 day(s). Patient will perform simple home management with modified independence and use of energy conservation techniques within 7 day(s). Patient will utilize energy conservation techniques during functional activities with less than 2 verbal cues Within 7 days. OCCUPATIONAL THERAPY EVALUATION  Patient: Raquel Khan (21 y.o. female)  Date: 5/29/2017  Primary Diagnosis: Alcoholism ([facility] [address]        Precautions: PACEMAKER, increased heart rate with minimal activity      ASSESSMENT :  Patient is a right hand dominant female who was admitted 5/27/2017 with diagnosis of Alcoholism and CHF. Patient had abstained from alcohol for over 13 years and began drinking (with friends) 3 weeks ago. Patient with a history of cardiac issues and has a pacemaker. Patient, over all set up to stand by assist with UE /LE ADL & ADL mobility : strength and UE range of motion are functional for basic ADL tasks , however, heart rate does increase with minimal activity ( as high as 106) . Patient with limited knowledge of energy conservation techniques (i.e pacing, rest breaks, use of adapted equipment). Patient will benefit from skilled intervention to address the above impairments.   Patients rehabilitation potential is considered to be Good  Factors which may influence rehabilitation potential include:   [X]             None noted  [ ]             Mental ability/status  [ ]             Medical condition  [ ]             Home/family situation and support systems  [ ]             Safety awareness  [ ]             Pain tolerance/management  [ ]             Other:        PLAN :  Recommendations and Planned Interventions:  [ ]               Self Care Training                  [ ]        Therapeutic Activities  [ ]               Functional Mobility Training    [ ]        Cognitive Retraining  [ ]               Therapeutic Exercises           [ ]        Endurance Activities  [ ]               Balance Training                   [ ]        Neuromuscular Re-Education  [ ]               Visual/Perceptual Training     [ ]   Home Safety Training  [ ]               Patient Education                 [ ]        Family Training/Education  [ ]               Other (comment):     Frequency/Duration: Patient will be followed by occupational therapy 3 times a week to address goals. Discharge Recommendations: To Be Determined  Further Equipment Recommendations for Discharge: would benefit from tub  Bench /shower bench        SUBJECTIVE:   Patient stated \" I need to go to the bathroom. \"      OBJECTIVE DATA SUMMARY:   HISTORY:   History reviewed. No pertinent past medical history.   Past Surgical History:   Procedure Laterality Date    CARDIAC SURG PROCEDURE UNLIST         pacemaker/defibrilator placed 2 2015    HX GYN         hysterectomy        Prior Level of Function/Home Situation: prior to admit : independent  To modified with ADL/IADL tasks   Expanded or extensive additional review of patient history:      Home Situation  Home Environment: Apartment  # Steps to Enter: 4  One/Two Story Residence: One story  Living Alone: Yes  Support Systems: [place] / ann community, Family member(s)  Patient Expects to be Discharged to[de-identified] Apartment  Current DME Used/Available at Home: Verenice Fuss or Shower Type: Tub/Shower combination  [X]  Right hand dominant             [ ]  Left hand dominant     EXAMINATION OF PERFORMANCE DEFICITS:  Cognitive/Behavioral Status:  Neurologic State: Alert  Orientation Level: Oriented X4     Perception: Appears intact  Perseveration: No perseveration noted  Safety/Judgement: Awareness of environment; Fall prevention     Skin: intact at visible UE      Edema: none noted at UE     Hearing: Auditory  Auditory Impairment: None     Vision/Perceptual:       No overt issues observed during ADL tasks                                Range of Motion:  UE AROM  WFL for ADL/ADL mobility                     Strength:   UE strength functional for ADL/ADL mobility               Coordination:     Fine Motor Skills-Upper: Right Intact; Left Intact    Gross Motor Skills-Upper: Left Intact; Right Intact     Tone & Sensation:   UE tone : normal:   Sensation: no over difficulties observed during ADL tasks                    Denies numbness/tingling at finger tips                              Balance:  Sitting: Intact  Standing: Impaired; Without support  Standing - Static: Good  Standing - Dynamic : Fair     Functional Mobility and Transfers for ADLs:  Bed Mobility:  Rolling: Independent  Supine to Sit: Independent  Sit to Supine: Supervision;Stand-by asssistance  Scooting: Independent     Transfers: due to multiple medical monitoring lines   Sit to Stand: Supervision  Stand to Sit: Supervision  Bed to Chair: Supervision  Toilet Transfer : Supervision     ADL Assessment:  Feeding: Independent     Oral Facial Hygiene/Grooming: Setup (seated)           Upper Body Dressing: Setup (assist with multiple medical monitor lines )     Lower Body Dressing: Minimum assistance (total assist to tie shoes )     Toileting: Modified independent (good use of grab bars as needed )                 ADL Intervention and task modifications:     Discussed energy conservation strategies (EC) :  sit to work, pacing self, being aware of heart rate and stopping to rest if it is rapid, benefits of AE-elastic shoe laces- to increase ease with LE dressing , etc... Supplies were set up at sink to allow for EC strategies to be used during next bathing attempt.                                            Functional Measure:  Barthel Index: Bathin  Bladder: 10  Bowels: 5  Groomin  Dressin  Feeding: 10  Mobility: 0  Stairs: 0  Toilet Use: 10  Transfer (Bed to Chair and Back): 10  Total: 60         Barthel and G-code impairment scale:  Percentage of impairment CH  0% CI  1-19% CJ  20-39% CK  40-59% CL  60-79% CM  80-99% CN  100%   Barthel Score 0-100 100 99-80 79-60 59-40 20-39 1-19    0   Barthel Score 0-20 20 17-19 13-16 9-12 5-8 1-4 0      The Barthel ADL Index: Guidelines  1. The index should be used as a record of what a patient does, not as a record of what a patient could do. 2. The main aim is to establish degree of independence from any help, physical or verbal, however minor and for whatever reason. 3. The need for supervision renders the patient not independent. 4. A patient's performance should be established using the best available evidence. Asking the patient, friends/relatives and nurses are the usual sources, but direct observation and common sense are also important. However direct testing is not needed. 5. Usually the patient's performance over the preceding 24-48 hours is important, but occasionally longer periods will be relevant. 6. Middle categories imply that the patient supplies over 50 per cent of the effort. 7. Use of aids to be independent is allowed. Dominick Clarity., Barthel, D.W. (0691). Functional evaluation: the Barthel Index. [facility] (14)2. [place] Wooten, MARILY.J.M.CLAY, Alvester Petersham.[address] (). Measuring the change indisability after inpatient rehabilitation; comparison of the responsiveness of the Barthel Index and Functional Whitley Measure. Journal of Neurology, Neurosurgery, and Psychiatry, 66(4), 230-404. Tamie Sandoval, N.J.A, Shruti Mosquera  WAnkitJ.M, & Mei Go MAnkitA. (2004.) Assessment of post-stroke quality of life in cost-effectiveness studies: The usefulness of the Barthel Index and the EuroQoL-5D. Quality of Life Research, 13, 823-08         G codes:   In compliance with CMSs Claims Based Outcome Reporting, the following G-code set was chosen for this patient based on their primary functional limitation being treated: The outcome measure chosen to determine the severity of the functional limitation was the Barthel with a score of 60/100 which was correlated with the impairment scale. · Self Care:               - CURRENT STATUS:    CL - 60%-79% impaired, limited or restricted               - GOAL STATUS:           CL - 60%-79% impaired, limited or restricted               - D/C STATUS:                       ---------------To be determined---------------      Occupational Therapy Evaluation Charge Determination   History Examination Decision-Making   MEDIUM Complexity : Expanded review of history including physical, cognitive and psychosocial  history  LOW Complexity : 1-3 performance deficits relating to physical, cognitive , or psychosocial skils that result in activity limitations and / or participation restrictions  MEDIUM Complexity : Patient may present with comorbidities that affect occupational performnce.  Miniml to moderate modification of tasks or assistance (eg, physical or verbal ) with assesment(s) is necessary to enable patient to complete evaluation       Based on the above components, the patient evaluation is determined to be of the following complexity level: LOW   Pain:  Pain Scale 1: Numeric (0 - 10)  Pain Intensity 1: 0              Activity Tolerance:   Fair: increased HR with minimal activity   After treatment:   [X] Patient left in no apparent distress sitting up in chair (eating lunch)   [ ] Patient left in no apparent distress in bed  [X] Call bell left within reach  [X] Nursing notified  [ ] Caregiver present  [ ] Bed alarm activated      COMMUNICATION/EDUCATION:   The patients plan of care was discussed with: Registered Nurse.  [X] Home safety education was provided and the patient/caregiver indicated understanding. [X] Patient/family have participated as able in goal setting and plan of care. [X] Patient/family agree to work toward stated goals and plan of care. [ ] Patient understands intent and goals of therapy, but is neutral about his/her participation. [ ] Patient is unable to participate in goal setting and plan of care. This patients plan of care is appropriate for delegation to [place].      Thank you for this referral.  Tami Pereira OTR/L   Tx Time: 30 minutes

## 2017-05-29 NOTE — PROGRESS NOTES
RRAT Score: 16  Initial Assessment: CM reviewed chart and met with patient for discharge planning. CM verified patients address and contact number as correct on the facesheet. Pt presented to ED with alcoholism. Patient is currently living alone. Her sister lives in [place]; however, her granddaughter lives in the [facility]. Patient is retired. Patient consented for CM to make appointment arrangements. Patients PCP is Dr. Mu Ring. She reported also seeing Dr. Slime Coon at [facility]. Due to the [facility] Day holiday CM was unable to schedule patient's appointment. Patient will not need assistance with obtaining medications. Patient voiced that she does have medications at home. Medications refills will likely not be needed on discharge. Patient uses [facility] [facility] to obtain medications. Patient reported having been sober for the past 10 to 15 years. She reported having recently relapsed. CM offered SA resources; however, client declined. She reported that her cardiologist would not be happy with her the next time she sees him. She reported having open heart surgery about 2 years ago. CM provided patient with a SA resource in case she needs it. CM reviewed IM form with patient and placed signed copy in chart. Emergency Contact: Autumn David [phone] and Darlene Reynoso (granddaughter) [phone]    Pertinent Medical Hx: see H&P     Transition Plan: Home with outpatient services. Involve patient/caregiver in assessment, planning, education and implement of intervention. Yes. CM will continue to follow case for discharge planning. CM daily patient care huddles/interdisciplinary rounds. Rounded with IDT. CM will handoff to [address] [place] [address] or PCP practice. CM evaluated for [facility] or [address] coordination of resources. CM will further assess if needed.      Care Management Interventions  PCP Verified by CM: No (will need to verify PCP)  Transition of Care Consult (CM Consult): Discharge Planning  Physical Therapy Consult: Yes  Occupational Therapy Consult: Yes  Current Support Network: Lives Alone  Confirm Follow Up Transport: Self    Christos Charlton, [employer], Resident in Training  [phone]

## 2017-05-30 ENCOUNTER — APPOINTMENT (OUTPATIENT)
Dept: NUCLEAR MEDICINE | Age: 72
DRG: 281 | End: 2017-05-30
Attending: INTERNAL MEDICINE
Payer: MEDICARE

## 2017-05-30 VITALS
RESPIRATION RATE: 16 BRPM | SYSTOLIC BLOOD PRESSURE: 148 MMHG | OXYGEN SATURATION: 100 % | WEIGHT: 197.8 LBS | BODY MASS INDEX: 38.83 KG/M2 | TEMPERATURE: 97.9 F | HEART RATE: 96 BPM | DIASTOLIC BLOOD PRESSURE: 69 MMHG | HEIGHT: 60 IN

## 2017-05-30 LAB
ANION GAP BLD CALC-SCNC: 9 MMOL/L (ref 5–15)
BASOPHILS # BLD AUTO: 0 K/UL (ref 0–0.1)
BASOPHILS # BLD: 0 % (ref 0–1)
BUN SERPL-MCNC: 6 MG/DL (ref 6–20)
BUN/CREAT SERPL: 6 (ref 12–20)
CALCIUM SERPL-MCNC: 8.2 MG/DL (ref 8.5–10.1)
CHLORIDE SERPL-SCNC: 103 MMOL/L (ref 97–108)
CO2 SERPL-SCNC: 28 MMOL/L (ref 21–32)
CREAT SERPL-MCNC: 1.03 MG/DL (ref 0.55–1.02)
EOSINOPHIL # BLD: 0.1 K/UL (ref 0–0.4)
EOSINOPHIL NFR BLD: 1 % (ref 0–7)
ERYTHROCYTE [DISTWIDTH] IN BLOOD BY AUTOMATED COUNT: 16.3 % (ref 11.5–14.5)
GLUCOSE BLD STRIP.AUTO-MCNC: 99 MG/DL (ref 65–100)
GLUCOSE SERPL-MCNC: 100 MG/DL (ref 65–100)
HCT VFR BLD AUTO: 31 % (ref 35–47)
HGB BLD-MCNC: 10.2 G/DL (ref 11.5–16)
LYMPHOCYTES # BLD AUTO: 37 % (ref 12–49)
LYMPHOCYTES # BLD: 1.9 K/UL (ref 0.8–3.5)
MAGNESIUM SERPL-MCNC: 1.8 MG/DL (ref 1.6–2.4)
MCH RBC QN AUTO: 30.2 PG (ref 26–34)
MCHC RBC AUTO-ENTMCNC: 32.9 G/DL (ref 30–36.5)
MCV RBC AUTO: 91.7 FL (ref 80–99)
MONOCYTES # BLD: 0.4 K/UL (ref 0–1)
MONOCYTES NFR BLD AUTO: 7 % (ref 5–13)
NEUTS SEG # BLD: 2.9 K/UL (ref 1.8–8)
NEUTS SEG NFR BLD AUTO: 55 % (ref 32–75)
PHOSPHATE SERPL-MCNC: 3.7 MG/DL (ref 2.6–4.7)
PLATELET # BLD AUTO: 212 K/UL (ref 150–400)
POTASSIUM SERPL-SCNC: 3.8 MMOL/L (ref 3.5–5.1)
RBC # BLD AUTO: 3.38 M/UL (ref 3.8–5.2)
SERVICE CMNT-IMP: NORMAL
SODIUM SERPL-SCNC: 140 MMOL/L (ref 136–145)
WBC # BLD AUTO: 5.3 K/UL (ref 3.6–11)

## 2017-05-30 PROCEDURE — 85025 COMPLETE CBC W/AUTO DIFF WBC: CPT | Performed by: STUDENT IN AN ORGANIZED HEALTH CARE EDUCATION/TRAINING PROGRAM

## 2017-05-30 PROCEDURE — 97116 GAIT TRAINING THERAPY: CPT | Performed by: PHYSICAL THERAPIST

## 2017-05-30 PROCEDURE — 74011250637 HC RX REV CODE- 250/637: Performed by: INTERNAL MEDICINE

## 2017-05-30 PROCEDURE — 74011250637 HC RX REV CODE- 250/637: Performed by: STUDENT IN AN ORGANIZED HEALTH CARE EDUCATION/TRAINING PROGRAM

## 2017-05-30 PROCEDURE — 84100 ASSAY OF PHOSPHORUS: CPT | Performed by: STUDENT IN AN ORGANIZED HEALTH CARE EDUCATION/TRAINING PROGRAM

## 2017-05-30 PROCEDURE — 97535 SELF CARE MNGMENT TRAINING: CPT

## 2017-05-30 PROCEDURE — 80048 BASIC METABOLIC PNL TOTAL CA: CPT | Performed by: STUDENT IN AN ORGANIZED HEALTH CARE EDUCATION/TRAINING PROGRAM

## 2017-05-30 PROCEDURE — 36415 COLL VENOUS BLD VENIPUNCTURE: CPT | Performed by: STUDENT IN AN ORGANIZED HEALTH CARE EDUCATION/TRAINING PROGRAM

## 2017-05-30 PROCEDURE — 82962 GLUCOSE BLOOD TEST: CPT

## 2017-05-30 PROCEDURE — A9500 TC99M SESTAMIBI: HCPCS

## 2017-05-30 PROCEDURE — 83735 ASSAY OF MAGNESIUM: CPT | Performed by: STUDENT IN AN ORGANIZED HEALTH CARE EDUCATION/TRAINING PROGRAM

## 2017-05-30 RX ORDER — DIAZEPAM 5 MG/1
5 TABLET ORAL
Status: CANCELLED | OUTPATIENT
Start: 2017-05-30

## 2017-05-30 RX ORDER — CARVEDILOL 3.12 MG/1
6.25 TABLET ORAL 2 TIMES DAILY WITH MEALS
Status: CANCELLED | OUTPATIENT
Start: 2017-05-30

## 2017-05-30 RX ORDER — ZOLPIDEM TARTRATE 5 MG/1
5 TABLET ORAL
Status: CANCELLED | OUTPATIENT
Start: 2017-05-30

## 2017-05-30 RX ORDER — CLOPIDOGREL BISULFATE 75 MG/1
75 TABLET ORAL DAILY
Status: CANCELLED | OUTPATIENT
Start: 2017-05-31

## 2017-05-30 RX ORDER — ONDANSETRON 2 MG/ML
4 INJECTION INTRAMUSCULAR; INTRAVENOUS
Status: CANCELLED | OUTPATIENT
Start: 2017-05-30

## 2017-05-30 RX ORDER — DIAZEPAM 5 MG/1
10 TABLET ORAL
Status: CANCELLED | OUTPATIENT
Start: 2017-05-30

## 2017-05-30 RX ORDER — ACETAMINOPHEN 325 MG/1
650 TABLET ORAL
Status: CANCELLED | OUTPATIENT
Start: 2017-05-30

## 2017-05-30 RX ORDER — LANOLIN ALCOHOL/MO/W.PET/CERES
100 CREAM (GRAM) TOPICAL DAILY
Status: CANCELLED | OUTPATIENT
Start: 2017-05-31

## 2017-05-30 RX ORDER — ATORVASTATIN CALCIUM 10 MG/1
20 TABLET, FILM COATED ORAL
Status: CANCELLED | OUTPATIENT
Start: 2017-05-30

## 2017-05-30 RX ORDER — FOLIC ACID 1 MG/1
1 TABLET ORAL DAILY
Status: CANCELLED | OUTPATIENT
Start: 2017-05-31

## 2017-05-30 RX ORDER — DOCUSATE SODIUM 100 MG/1
100 CAPSULE, LIQUID FILLED ORAL 2 TIMES DAILY
Status: CANCELLED | OUTPATIENT
Start: 2017-05-30

## 2017-05-30 RX ORDER — FOLIC ACID 1 MG/1
1 TABLET ORAL DAILY
Status: DISCONTINUED | OUTPATIENT
Start: 2017-05-30 | End: 2017-05-30 | Stop reason: HOSPADM

## 2017-05-30 RX ORDER — SODIUM CHLORIDE 0.9 % (FLUSH) 0.9 %
5-10 SYRINGE (ML) INJECTION AS NEEDED
Status: CANCELLED | OUTPATIENT
Start: 2017-05-30

## 2017-05-30 RX ORDER — SODIUM CHLORIDE 0.9 % (FLUSH) 0.9 %
5-10 SYRINGE (ML) INJECTION EVERY 8 HOURS
Status: CANCELLED | OUTPATIENT
Start: 2017-05-30

## 2017-05-30 RX ADMIN — NITROGLYCERIN 0.5 INCH: 20 OINTMENT TOPICAL at 06:32

## 2017-05-30 RX ADMIN — MULTIPLE VITAMINS W/ MINERALS TAB 1 TABLET: TAB at 17:41

## 2017-05-30 RX ADMIN — Medication 10 ML: at 17:27

## 2017-05-30 RX ADMIN — NITROGLYCERIN 0.5 INCH: 20 OINTMENT TOPICAL at 17:24

## 2017-05-30 RX ADMIN — ACETAMINOPHEN 650 MG: 325 TABLET ORAL at 00:23

## 2017-05-30 RX ADMIN — FOLIC ACID 1 MG: 1 TABLET ORAL at 17:27

## 2017-05-30 RX ADMIN — CARVEDILOL 6.25 MG: 3.12 TABLET, FILM COATED ORAL at 09:24

## 2017-05-30 RX ADMIN — CLOPIDOGREL BISULFATE 75 MG: 75 TABLET ORAL at 09:24

## 2017-05-30 RX ADMIN — Medication 100 MG: at 09:24

## 2017-05-30 RX ADMIN — CARVEDILOL 6.25 MG: 3.12 TABLET, FILM COATED ORAL at 17:23

## 2017-05-30 RX ADMIN — Medication 10 ML: at 06:31

## 2017-05-30 NOTE — FACE TO FACE
[facility]  Face to Face Encounter    Patients Name: Carlos Hendrix    YOB: 1945    Primary Diagnosis: Heart Failure    Date of Face to Face:   5/30/2017                                  Face to Face Encounter findings are related to primary reason for home care:   yes.        I certify that this patient is under my care and that I,The following are the clinical findings from the [address] [place] [address] encounter that support the need for skilled services and is a summary of the encounter:     See discharge summary    Pt requires DME: Myah Chang MD  5/30/2017

## 2017-05-30 NOTE — PROGRESS NOTES
Problem: Mobility Impaired (Adult and Pediatric)  Goal: *Acute Goals and Plan of Care (Insert Text)  Physical Therapy Goals  Initiated 5/29/2017  1. Patient will move from supine to sit and sit to supine in bed with modified independence within 7 day(s). 2. Patient will transfer from bed to chair and chair to bed with modified independence using the least restrictive device within 7 day(s). 3. Patient will perform sit to stand with modified independence within 7 day(s). 4. Patient will ambulate with supervision/set-up for 75 feet with the least restrictive device within 7 day(s). PHYSICAL THERAPY TREATMENT  Patient: Franky Logan (36 y.o. female)  Date: 5/30/2017  Diagnosis: Alcoholism ([address] Alcoholism ([facility] [address]       Precautions: Other (comment)      ASSESSMENT:  Patient with improved balance and mobility this date. Patient received out of bed in chair at start of session. Patient only agreeable to ambulate 35 feet with SBA; patient does not report fatigue after ambulation. Patient reports that she generally walks less than this distance at home. Patient steadier today when compared to yesterday's session. No loss of balance noted. Patient educated on pacing techniques during ambulation. Patient walked to bathroom following ambulation in hallway; OT Derick Counter present post physical therapy session. Patient may benefit from home health services for safety assessment upon discharge. Progression toward goals:  [X]    Improving appropriately and progressing toward goals  [ ]    Improving slowly and progressing toward goals  [ ]    Not making progress toward goals and plan of care will be adjusted       PLAN:  Patient continues to benefit from skilled intervention to address the above impairments. Continue treatment per established plan of care.   Discharge Recommendations: Home with Home Health to assess safety   Further Equipment Recommendations for Discharge:  TBD       SUBJECTIVE:   Patient stated I feeling a little better today. I'm steadier but not as steady as I am at home.       OBJECTIVE DATA SUMMARY:   Critical Behavior:  Neurologic State: Alert, Appropriate for age  Orientation Level: Oriented X4     Safety/Judgement: Awareness of environment, Fall prevention  Functional Mobility Training:  Transfers:  Sit to Stand: Modified independent  Stand to Sit: Modified independent      Patient received out of bed in chair at start of session. Patient ambulated to bathroom after ambulation in hallway; OT Megan Gilbert present post physical therapy session. Balance:  Sitting: Intact  Standing: Impaired  Standing - Static: Good  Standing - Dynamic : Fair      Ambulation/Gait Training:  Distance (ft): 35 Feet (ft)  Assistive Device: Gait belt  Ambulation - Level of Assistance: Stand-by asssistance        Gait Abnormalities: Decreased step clearance;Trunk sway increased  Base of Support: Widened  Speed/Meghan: Pace decreased (<100 feet/min)  Step Length: Left shortened;Right shortened      Patient much steadier today when compared to yesterday. Patient only agreeable to ambulate 35 feet; patient does not report fatigue following ambulation. Therapeutic Exercises:   Patient performed seated hip flexion, long arc quads, and ankle pumps for 10 reps. Instructed to perform throughout day as able. Pain:  Pain Scale 1: Numeric (0 - 10)  Pain Intensity 1: 0  Pain Location 1: Head  Pain Orientation 1: Medial  Pain Description 1: Aching  Pain Intervention(s) 1: Medication (see MAR)  Activity Tolerance:   Good  Please refer to the flowsheet for vital signs taken during this treatment.   After treatment:   [X]    Patient left in no apparent distress sitting up in chair  [ ]    Patient left in no apparent distress in bed  [X]    Call bell left within reach  [X]    Nursing notified  [ ]    Caregiver present  [ ]    Bed alarm activated      COMMUNICATION/COLLABORATION:   The patients plan of care was discussed with: Physical Therapist, Occupational Therapist and Registered Nurse     Mahendra Bruce, PT   Time Calculation: 13 mins

## 2017-05-30 NOTE — CDMP QUERY
Possible resolving MI POA in setting of Alcoholism, UTI,  elevated trop, hypotension requiring card cx, further cardiac w/u  =>Other Explanation of clinical findings  =>Unable to Determine (no explanation of clinical findings)    The medical record reflects the following clinical findings, treatment, and risk factors:    Risk Factors: 71 BF w/hx CHF/CABG, AICD, alcohol abuse x 3 wks, presented w/abd pain, n/v  Clinical Indicators: admit Lipase 703- 356, , CKMB 6.2- 4.1, Trop 0.34- 0.31, proBNP 4731, Alcohol 79, + UA, per 5/28 card \"ischemic cardiomyopathy, prior septal infarct, poss bharath-infarction ischemia/ongoing injury, ventricular ectopy, hypotension interfering w/necessary meds\" on 5/29 card \"intermediate coronary syndrome w/nontransmural damage to septum\"  Treatment: Plavix, Coreg, for poss cath    Please clarify and document your clinical opinion in the progress notes and discharge summary including the definitive and/or presumptive diagnosis, (suspected or probable), related to the above clinical findings. Please include clinical findings supporting your diagnosis.   Thank Floyd Mccarthy [place]   [phone]

## 2017-05-30 NOTE — PROGRESS NOTES
PAULINA was contacted by Teresa Corey and informed that the shower bench and shower chair would not be covered under patient's insurance. PAULINA will notify nursing staff.     Chata Yap, Resident in Training  [phone]

## 2017-05-30 NOTE — PROGRESS NOTES
Pt accepted for transfer by Dr Krista Martino MD Awaiting bed availability      Cardiac Electrophysiology  Location: [address] [facility] - [address] [place], [address] [facility]  [address] [place], [address], [address] [facility], [place]  [phone] 1 2 3 4 5 6 »

## 2017-05-30 NOTE — PROGRESS NOTES
Cardiology:    Myocardial perfusion imaging at rest shows full viability of the ventricular septum while confirming non motion as shown on echo. By definition this represents hibernating myocardium and treatment of choice would be PCI if applicable. I explained this to her and she does not want to make a decision before she speaks with Dr. Jeremy Michel, who implanted her pacer/ICD. I have placed a call to the [address] [place] [address] office. We need to engage her sister in the conversation. The matter seems urgent enough so that the patient's request to go home and think about this \"for a week\" bears significant risk. She should only follow this course with well documented informed refusal.     BP is sufficient to escalate the nitrate for now. Lu8ngs are clear, no sign of CHF.     Veronica Cisneros MD

## 2017-05-30 NOTE — PROGRESS NOTES
1900--bedside report received from malick alvarado. Pt. Resting in bed oriented x 4. Pt denies pain, sob, nausea, pt c/o  \" i have been having diarrhea\", rn explains iv antibotic pt is receiving can sometimes cause diarrhea. Will monitor. Call bell in reach. Assessment as noted. 2200--pt assisted up to bathroom with minimal assist reports \"my bowel movements are slowing down now\", no complaints at this time. Back to bed, call bell in reach. 0030--650mg tylenol given po for pt c/o headache. bp 119/62, HR 93    0400--am labs drawn and sent to lab per orders    0600--chemistry not resulted, per [employer], will post chemistry results    0700--bedside report given to malick lundberg who is assuming care of pt.

## 2017-05-30 NOTE — PROGRESS NOTES
Problem: Self Care Deficits Care Plan (Adult)  Goal: *Therapy Goal (Edit Goal, Insert Text)  Patient will perform bathing with modified independence and use of energy conservation strategies within 7 day(s). Patient will perform upper body dressing and lower body dressing with modified independence and use of energy conservation techniques within 7 day(s). Patient will perform simple home management with modified independence and use of energy conservation techniques within 7 day(s). Patient will utilize energy conservation techniques during functional activities with less than 2 verbal cues Within 7 days. OCCUPATIONAL THERAPY TREATMENT  Patient: Juan Loza (59 y.o. female)  Date: 5/30/2017  Diagnosis: Alcoholism ([facility] Alcoholism ([facility]       Precautions: Other (comment)      ASSESSMENT:  Progressing well, up with PT to bathroom without DME, SBA noted, able to do all self care in bathroom with steadiness and no LOB, patient educated with handout on energy conservation for the home and ADLs since she lives alone, expected to have sister assist with meal prep over next week or so. Educated on building endurance, fall prevention, AE use, shower chair use and standing ADLs to promote increased strength. Good understanding noted, expect good gains with HH. Progression toward goals:  [X]       Improving appropriately and progressing toward goals  [ ]       Improving slowly and progressing toward goals  [ ]       Not making progress toward goals and plan of care will be adjusted       PLAN:  Patient continues to benefit from skilled intervention to address the above impairments. Continue treatment per established plan of care. Discharge Recommendations:  Home Health  Further Equipment Recommendations for Discharge:  Shower chair       SUBJECTIVE:   Patient stated My sister is going to pick me up some  Microwave meals.       OBJECTIVE DATA SUMMARY:   Cognitive/Behavioral Status:   alert working with PT upon arrival                    Functional Mobility and Transfers for ADLs:  Bed Mobility:        Transfers:  Sit to Stand: Modified independent  Functional Transfers  Toilet Transfer : Modified independent     Balance:        ADL Intervention:     Patient instructed and indicated understanding energy conservation techniques to increase independence and safety during ADLs with 1 visual handout provided. Educated patient on energy conservation and strategies to maximize her quality of life and decrease her stress/anxiety. 1. Deep breathing  2. Educated on pacing and making sure he/she takes short frequent breaks (e.g. In the shower wash the upper body, rest for 1 minute, then wash the lower body, etc)  3. Educated on using cooler water in the shower so as to not get fatigued from the heat  4. Educated on drying off by using a yassine cloth robe  5. Educated on re-arranging his/her routine to allow for rest breaks in the morning routine  6. Educated on using a mantra and medication to decrease feelings of anxiety, especially when short of breath  8. Educated on looking at the consequences of his/her actions before deciding he/she needs to take on a task (e.g not getting down on one's hands and knees to wash floors because it will take all of one's energy for the day and result in exhaustion). 9. Educated on fall alert necklaces/bracelets to increase safety  10. Educated on DME used to help conserve energy, such as a shower seat, a stool or chair in the kitchen, and pushing or pulling items instead of carrying them       Patient instructed and indicated understanding the benefits of maintaining activity tolerance, functional mobility, and independence with self care tasks during acute stay  to ensure safe return home and to baseline.  Encouraged patient to increase frequency and duration OOB, be out of bed for all meals, perform daily ADLs (as approved by RN/MD regarding bathing etc), and performing functional mobility to/from bathroom. Patient instructed and demonstrated understanding walk up to sink/counter top/surfaces, without DME increase safety of joint and fall prevention and ADLs within the home Patient instructed and indicated understanding to increase independence by increasing amount of time standing, observe standing position during ADLs so can increase even WB through bilateral LEs, good understanding noted to build up standing tasks daily. Neuro Re-Education:           Therapeutic Exercises:   encouraged OOB and full participation with ADLs to improve strength and endurance. Pain:  Pain Scale 1: Numeric (0 - 10)  Pain Intensity 1: 0  Pain Location 1: Head  Pain Orientation 1: Medial  Pain Description 1: Aching  Pain Intervention(s) 1: Medication (see MAR)  Activity Tolerance:   VSS< BP stable after ambulation,   Please refer to the flowsheet for vital signs taken during this treatment.   After treatment:   [X] Patient left in no apparent distress sitting up in chair  [ ] Patient left in no apparent distress in bed  [X] Call bell left within reach  [X] Nursing notified  [ ] Caregiver present  [ ] Bed alarm activated      COMMUNICATION/COLLABORATION:   The patients plan of care was discussed with: Physical Therapist and Registered Nurse     Manjeet Greco OT  Time Calculation: 12 mins

## 2017-05-30 NOTE — PROGRESS NOTES
Spiritual Care Assessment/Progress Notes    Ramiro Beck [id number]  [id number]    1945  80 y.o.  female    Patient Telephone Number: [phone] (home)   Hoahaoism Affiliation: Duane Shears   Language: English   Extended Emergency Contact Information  Primary Emergency Contact: [address] Phone: [phone]  Relation: Sister  Secondary Emergency Contact: [address] [facility]  Mobile Phone: [phone]  Relation: Other Relative   Patient Active Problem List    Diagnosis Date Noted    Alcoholism [facility]) 05/27/2017    Elevated troponin 05/27/2017    CHF (congestive heart failure) ([facility] 05/27/2017        Date: 5/30/2017       Level of Hoahaoism/Spiritual Activity:  []         Involved in ann tradition/spiritual practice    []         Not involved in ann tradition/spiritual practice  [x]         Spiritually oriented    []         Claims no spiritual orientation    []         seeking spiritual identity  []         Feels alienated from Latter-day practice/tradition  []         Feels angry about Latter-day practice/tradition  [x]         Spirituality/Latter-day PRAYER IS a resource for coping at this time.   []         Not able to assess due to medical condition    Services Provided Today:  []         crisis intervention    []         reading Scriptures  [x]         spiritual assessment    [x]         prayer  [x]         empathic listening/emotional support  []         rites and rituals (cite in comments)  []         life review     []         Latter-day support  []         theological development   []         advocacy  []         ethical dialog     []         blessing  []         bereavement support    []         support to family  []         anticipatory grief support   []         help with AMD  []         spiritual guidance    []         meditation      Spiritual Care Needs  []         Emotional Support  [x]         Spiritual/Hoahaoism Care  [] Loss/Adjustment  []         Advocacy/Referral                /Ethics  []         No needs expressed at               this time  []         Other: (note in               comments)  Spiritual Care Plan  []         Follow up visits with               pt/family  []         Provide materials  []         Schedule sacraments  []         Contact Community               Clergy  [x]         Follow up as needed  []         Other: (note in               comments)     Comments: Initial spiritual assessment in PCU. Miss Lindy Magdaleno spoke about her needing another operation. She has a sister and grandchildren, her daughter has . She shared she has strong ann in God and requested prayer. Provided spiritual presence and prayer.    Visited by: Sherif Pool [id number] [facility] [address] ([id number])

## 2017-05-30 NOTE — PROGRESS NOTES
0730 Bedside and Verbal shift change report given to me (oncoming nurse) by Malinda Schwarz RN (offgoing nurse). Report included the following information SBAR, Kardex, ED Summary, Intake/Output, MAR, Recent Results and Cardiac Rhythm sinus rhythm with freq PVCs. Patient states she continues to feel like she is improving. CIWA remains 0. Plan for Nuclear Scan this shift. Reviewed this procedure with patient, reinforcing information given by Dr. Johny Oleary last evening.     Ambulating to BR with fairly Strong and steady gait

## 2017-05-30 NOTE — PROGRESS NOTES
Hospitalist Progress Note   Patient Name  Becky Green   Admit date:  5/27/2017   Medical Record Number  [id number]    Age  80 y.o.    Date of Birth 1945   PCP None    Room Number  [facility]     Admission Diagnoses:  Alcoholism [facility])     Assessment/ Plan:     Cardiac: CHF/ CABG/ AICD insitu/   Tachycardia, Hypotension: now improved  -getting ECHO, cardiac consult, Trop ranging 0.26-> 0.34, this likely is her baseline  seems not MI related, CKMB is normal, but concern about WMA, so we are getting stress testing  Appreciate cardiology input  Poss home this pm  Old notes from [facility]  ECHO: EF 20%, MV prosthesis, mild diffuse hypokinesis, PASP 45  -Seen by cardiology, pt received slow ivf on 5/28, because of low BP, and she seemed to improved    UTI  -UCx > 100k cols of Pan suceptible E Coli  -Rocephin started 5/27-5/30, this should be sufficient to treat her UTI     Alcoholism, with acute intoxication   -CIWA scores stable, not in withdrawal        CT Brain:  severe central stenosis at the level of C1.     ED: [facility] 5/ Hb 12/   Dirty UA/ Cr 0.8  BNP 5K  EKG Q wave in 3,   BAL 89  EKG sinus tachycardia     CXR: The heart is mildly  enlarged, and the patient is post valvuloplasty, CABG, and pacemaker/AICD  placement     FLOOD: TSH 2.4    Principal Problem:    Alcoholism ([facility] (5/27/2017)    Active Problems:    Elevated troponin (5/27/2017)      CHF (congestive heart failure) ([facility] (5/27/2017)      Body mass index is 38.63 kg/(m^2).     Functional Status  good   CODE STATUS  Full   Surrogate decision maker: Sister Ciaran Cho   Prophylaxis  SCD's   Discharge Plan: Home w/Family,    There are currently no Active Isolations Payor: [employer] MEDICARE / Plan: [facility] HU[employer]NA MEDICARE HMO / Product Type: Managed Care Medicare /    Social issues  Date Comment              Prognosis                Subjective:   \"I'm getting there\"  Pt feels stronger than yesterday, but not at baseline yet      Objective:     Physical Exam:    Gen: Obese, lying in bed, comfortable   HEENT: Pink conjunctivae, PERRL, hearing intact to voice, moist mucous membranes  Neck: Supple, without masses, thyroid non-tender  Resp: Decreased AE at the L base  Card: No murmurs, normal S1, S2 without thrills trace Bipedal edema  Abd: Soft, non-tender, non-distended, normoactive bowel sounds are present, no palpable organomegaly  Lymph: No cervical adenopathy  Musc: No cyanosis or clubbing  Skin: No rashes or ulcers, skin turgor is good  Neuro: Cranial nerves 3-12 are grossly intact,  strength is 5/5 bilaterally, dorsi / plantarflexion strength is 5/5 bilaterally, follows commands appropriately  Psych: Alert with good insight. Oriented to person, place, and time       05/30 0701 - 05/30 1900  In: -   Out: 200 [Urine:200]    Intake/Output Summary (Last 24 hours) at 05/30/17 0937  Last data filed at 05/30/17 0847   Gross per 24 hour   Intake              150 ml   Output             1250 ml   Net            -1100 ml    Weight change: -0.378 kg (-13.4 oz)     Wt Readings from Last 10 Encounters:   05/30/17 89.7 kg (197 lb 12.8 oz)   10/21/12 63.5 kg (140 lb)        Relevant informations: Other medical conditions listed in this following active hospital problem list section; all of these and other pertinent data were taken into consideration when treatment plan is developed and customized to this patient's unique overall circumstances and needs.    Patient Active Problem List    Diagnosis Date Noted    Alcoholism [facility]) 05/27/2017    Elevated troponin 05/27/2017    CHF (congestive heart failure) ([facility] 05/27/2017          Data Review:   Recent Days:  Recent Labs      05/30/17   0337  05/29/17   0543  05/28/17   0329  05/27/17   1123   WBC  5.3  4.8  4.3  5.5   HGB  10.2*  10.3*  10.8*  11.5   HCT  31.0*  31.5*  32.0*  34.3*   PLT  212  231  303  351     Recent Labs      05/30/17   0337 05/29/17   0543  05/28/17   8520 05/28/17   0329  05/27/17   1226  05/27/17   1123   NA  140  138   --   135*   --   136   K  3.8  3.6   --   4.1   --   4.0   CL  103  102   --   98   --   95*   CO2  28  29   --   28   --   21   GLU  100  107*   --   126*   --   103*   BUN  6  7   --   7   --   6   CREA  1.03*  1.09*   --   1.10*   --   0.86   CA  8.2*  7.9*   --   8.3*   --   8.8   MG  1.8  2.0  1.6   --    --    --    PHOS  3.7   --    --    --    --    --    ALB   --    --    --    --    --   3.4*   TBILI   --    --    --    --    --   0.7   SGOT   --    --    --    --    --   39*   ALT   --    --    --    --    --   32   INR   --    --    --    --   1.0   --      Lab Results   Component Value Date/Time    TSH 2.42 05/27/2017 04:45 PM     ______________________________________________________________________________________________________    Medications reviewed     Current Facility-Administered Medications   Medication Dose Route Frequency    atorvastatin (LIPITOR) tablet 20 mg  20 mg Oral QHS    thiamine (B-1) tablet 100 mg  100 mg Oral DAILY    nitroglycerin (NITROBID) 2 % ointment 0.5 Inch  0.5 Inch Topical Q6H    diazePAM (VALIUM) tablet 10 mg  10 mg Oral Q1H PRN    diazePAM (VALIUM) tablet 5 mg  5 mg Oral Q1H PRN    carvedilol (COREG) tablet 6.25 mg  6.25 mg Oral BID WITH MEALS    clopidogrel (PLAVIX) tablet 75 mg  75 mg Oral DAILY    sodium chloride (NS) flush 5-10 mL  5-10 mL IntraVENous Q8H    sodium chloride (NS) flush 5-10 mL  5-10 mL IntraVENous PRN    acetaminophen (TYLENOL) tablet 650 mg  650 mg Oral Q4H PRN    ondansetron (ZOFRAN) injection 4 mg  4 mg IntraVENous Q4H PRN    docusate sodium (COLACE) capsule 100 mg  100 mg Oral BID    zolpidem (AMBIEN) tablet 5 mg  5 mg Oral QHS PRN    cefTRIAXone (ROCEPHIN) 1 g in 0.9% sodium chloride (MBP/ADV) 50 mL  1 g IntraVENous Q24H       ______________________________________________________________________________________________________  Care Plan discussed with: Patient/Family  ____________________________________________________________________________________________________    High complexity decision making was performed for this patient who is at high risk for decompensation with multiple organ involvement. Today total floor/unit time was 30 minutes while caring for this patient and greater than 50% of that time was spent with patient (and/or family) discussing patients clinical issues.   ______________________________________________________________________________________________________  Ameena Russell MD                      5/30/2017

## 2017-05-30 NOTE — INTERDISCIPLINARY ROUNDS
CM rounded with IDT and discussed patient's care. CM will assist with discharge planning. Patient will follow up with Dr. Salty Canas on 6/5/17 at 10am. Patient is seen by Dr. Verito Nicole at [facility] for cardiology. Patient's will be contacted by the doctors office with her appointment due to the next available not being until September 2017. CM completed a DME referral to [address] [place] [address] for a shower bench. CM will monitor.       Payal Johnson, Resident in Training  [phone]

## 2017-05-30 NOTE — DISCHARGE SUMMARY
Physician Discharge Summary     Pt Name  Sanju Liu date:  5/27/2017   Discharge date and time:  5/31/2017    Room Number  [address] Record Number  [id number] @ [facility]   Age  80 y.o. Date of Birth 1945   PCP None     Admission Diagnoses:                        NSTEMI (non-ST elevated myocardial infarction) [facility])     Hospital Problems  Never Reviewed          Codes Class Noted POA    * (Principal)NSTEMI (non-ST elevated myocardial infarction) ([facility] ICD-10-CM: I21.4  ICD-9-CM: 410.70  5/31/2017 Unknown        Al[facility] ([facility] ICD-10-CM: F10.20  ICD-9-CM: 303.90  5/27/2017 Unknown        Elevated troponin ICD-10-CM: R74.8  ICD-9-CM: 790.6  5/27/2017 Unknown        CHF (congestive heart failure) ([facility] ICD-10-CM: I50.9  ICD-9-CM: 428.0  5/27/2017 Unknown               No Known Allergies     Excerpt from HPI :\"Drinking some whiskey and it made me sick. \"     Miss Saintclair Berthold is a previous alcoholic but stop drinking for approximately 15 years. Unfortunately, she went to a party recently and resumed drinking heavily over the last three weeks. Her sister visited today and become concerned as the house was unkempt and patient have not been answering the phone so she brought her to the hospital she stated that the patient was simply not looking herself, and there was no food in the house.     Patient also complains of abdominal pain with nausea and vomiting in the last few days. Denies fever or urinary symptoms     Hospital Course: This pt was admitted with  NSTEMI (non-ST elevated myocardial infarction) ([facility] on 5/27/2017.       Resolving NSTEMI POA  Ischemic Septum on Perfusion scanning  Accepted by Dr Constance Irene for transfer to [facility] for angiography and probable stenting   Trop ranging 0.26-> 0.34    Cardiac: Chronic Systolic Heart Faiure/ CABG/ AICD insitu/   Tachycardia, Hypotension: now improved  -getting ECHO, cardiac consult,  seems not MI related, CKMB is normal, but concern about WMA, so we are getting stress testing  Appreciate cardiology input  Poss home this pm  Old notes from [facility]  ECHO: EF 20%, MV prosthesis, mild diffuse hypokinesis, PASP 45  -Seen by cardiology, pt received slow ivf on 5/28, because of low BP, and she seemed to improved     UTI  -UCx > 100k cols of Pan suceptible E Coli  -Rocephin started 5/27-5/30, this should be sufficient to treat her UTI      Alcoholism, with acute intoxication   -CIWA scores stable, not in withdrawal          CT Brain:  severe central stenosis at the level of C1.      ED: [facility] 5/ Hb 12/   Dirty UA/ Cr 0.8  BNP 5K  EKG Q wave in 3,   BAL 89  EKG sinus tachycardia      CXR: The heart is mildly  enlarged, and the patient is post valvuloplasty, CABG, and pacemaker/AICD  placement      FLOOD: TSH 2.4    Per Dr Nichole Ferreira note:  Myocardial perfusion imaging at rest shows full viability of the ventricular septum while confirming non motion as shown on echo. By definition this represents hibernating myocardium and treatment of choice would be PCI if applicable. I explained this to her and she does not want to make a decision before she speaks with Dr. Jasiel Blancas, who implanted her pacer/ICD. I have placed a call to the S office.           Condition at the time of discharge improved and stable . Query:   Treatment team[de-identified] Treatment Team: Consulting Provider: Willie Cheadle, MD; Utilization Review: Juanita Guerrier RN; Consulting Provider: Claudette Saucer, MD       Other Pertinent data:   TODAY's CLINICAL FINDINGS:   Visit Vitals    /69 (BP 1 Location: Left arm, BP Patient Position: Sitting; At rest)    Pulse 96    Temp 97.9 °F (36.6 °C)    Resp 16    Ht 5' (1.524 m)    Wt 89.7 kg (197 lb 12.8 oz)    SpO2 100%    Breastfeeding No    BMI 38.63 kg/m2      Wt Readings from Last 10 Encounters:   05/30/17 89.7 kg (197 lb 12.8 oz)   10/21/12 63.5 kg (140 lb)       Physical Exam:    Gen: Obese, lying in bed, comfortable   HEENT: Pink conjunctivae, PERRL, hearing intact to voice, moist mucous membranes  Neck: Supple, without masses, thyroid non-tender  Resp: Decreased AE at the L base  Card: No murmurs, normal S1, S2 without thrills trace Bipedal edema  Abd: Soft, non-tender, non-distended, normoactive bowel sounds are present, no palpable organomegaly  Lymph: No cervical adenopathy  Musc: No cyanosis or clubbing  Skin: No rashes or ulcers, skin turgor is good  Neuro: Cranial nerves 3-12 are grossly intact,  strength is 5/5 bilaterally, dorsi / plantarflexion strength is 5/5 bilaterally, follows commands appropriately  Psych: Alert with good insight. Oriented to person, place, and time       Disposition:Home. Diet: cardiac liquids   Care Plan discussed with: Patient/Family   Follow up   Follow-up Information     Follow up With Details Comments Contact [facility]    [employer] [facility] Office on Aging  You will be contacted by a health  to schedule a home visit. Anisha Maza MD Go on 6/5/2017 Your appointment is scheduled for 6/5/17 at [place] [address]  [phone]      [facility]  You will be contacted by Dr. Rizwana Toribio office with your follow-up appointment date and time. [address]    None   None ([phone]) Patient stated that they have no PCP           Discharge Medication List as of 5/30/2017  5:36 PM             Significant Diagnostic Studies:   Recent Labs      05/30/17   0337  05/29/17   0543   WBC  5.3  4.8   HGB  10.2*  10.3*   HCT  31.0*  31.5*   PLT  212  231     Recent Labs      05/30/17   0337  05/29/17   0543   NA  140  138   K  3.8  3.6   CL  103  102   CO2  28  29   BUN  6  7   CREA  1.03*  1.09*   GLU  100  107*   CA  8.2*  7.9*   MG  1.8  2.0   PHOS  3.7   --      No results for input(s): SGOT, GPT, ALT, AP, TBIL, TBILI, TP, ALB, GLOB, GGT, AML, LPSE in the last 72 hours.     No lab exists for component: AMYP, HLPSE  No results for input(s): INR, PTP, APTT in the last 72 hours. No lab exists for component: INREXT, INREXT   No results for input(s): FE, TIBC, PSAT, FERR in the last 72 hours. No results for input(s): PH, PCO2, PO2 in the last 72 hours. No results for input(s): CPK, CKMB in the last 72 hours. No lab exists for component: TROPONINI  Lab Results   Component Value Date/Time    Glucose (POC) 99 05/30/2017 07:17 AM           ________________________________________________________________________    Norma Serrato Time for face to face meeting with the pt and examination including care coordination with staff and chart review (>50% of total time listed here )  Approx. 45 min, including discussion with cardiology and making transfer arrangements]    ________________________________________________________________________    Becca Zhong MD  5/31/2017

## 2017-05-30 NOTE — PROGRESS NOTES
Cardiology:    I discussed the case with Dr. John Crump. Although he is an electrophysiologist she identifies him as her primary cardiologist.  After explaining the current situation of presumed ischemic but nonmoving myocardium in the ventricular septum he agreed that early angiography and possible PCI would be the best possible approach. The patient had initially expressed willingness to be transferred to Dr. Haritha nichols but when confronted with the idea that we were going to plan [facility], she balked, and she has become insistent on simply going home. She does not seem to have a rational approach to the matter but she is also not explaining what the urgency is for her to be home \" for one little day\". She has not shown any sign of alcohol withdrawal since being in the hospital, and this does not appear to be the reason. Her sister is coming in this afternoon and if after resuming with family this still seems to be her plan, we will need to comply with her wishes but informed refusal for hospital transfer should include acknowledgment of the fact that she actually has a heart attack in process and that the temporary nonmovement her septal myocardium will inevitably become permanent almost regardless of medication with continued self-care activity. It is quite possible that what she has is a collateralized total occlusion of the LAD, in which case time would be even more of the essence.     Clifton Zuniga MD

## 2017-05-31 PROBLEM — I21.4 NSTEMI (NON-ST ELEVATED MYOCARDIAL INFARCTION) (HCC): Status: ACTIVE | Noted: 2017-05-31

## 2017-06-07 LAB
ATRIAL RATE: 117 BPM
CALCULATED P AXIS, ECG09: 71 DEGREES
CALCULATED R AXIS, ECG10: -32 DEGREES
CALCULATED T AXIS, ECG11: 30 DEGREES
DIAGNOSIS, 93000: NORMAL
P-R INTERVAL, ECG05: 136 MS
Q-T INTERVAL, ECG07: 348 MS
QRS DURATION, ECG06: 98 MS
QTC CALCULATION (BEZET), ECG08: 485 MS
VENTRICULAR RATE, ECG03: 117 BPM

## 2018-09-20 ENCOUNTER — HOSPITAL ENCOUNTER (OUTPATIENT)
Dept: GENERAL RADIOLOGY | Age: 73
Discharge: HOME | End: 2018-09-20
Payer: MEDICARE

## 2018-09-20 DIAGNOSIS — M25.569 KNEE PAIN: ICD-10-CM

## 2018-09-20 PROCEDURE — 73562 X-RAY EXAM OF KNEE 3: CPT

## 2019-02-09 ENCOUNTER — HOSPITAL ENCOUNTER (EMERGENCY)
Age: 74
Discharge: HOME | End: 2019-02-09
Attending: EMERGENCY MEDICINE
Payer: MEDICARE

## 2019-02-09 VITALS
BODY MASS INDEX: 42.21 KG/M2 | OXYGEN SATURATION: 99 % | WEIGHT: 215 LBS | HEART RATE: 82 BPM | RESPIRATION RATE: 17 BRPM | HEIGHT: 60 IN | TEMPERATURE: 97.2 F | SYSTOLIC BLOOD PRESSURE: 150 MMHG | DIASTOLIC BLOOD PRESSURE: 88 MMHG

## 2019-02-09 DIAGNOSIS — J06.9 ACUTE URI: Primary | ICD-10-CM

## 2019-02-09 PROCEDURE — 99282 EMERGENCY DEPT VISIT SF MDM: CPT

## 2019-02-09 RX ORDER — GUAIFENESIN 100 MG/5ML
400 SOLUTION ORAL
Qty: 1 BOTTLE | Refills: 0 | Status: SHIPPED | OUTPATIENT
Start: 2019-02-09 | End: 2019-12-24

## 2019-02-09 RX ORDER — AMOXICILLIN 875 MG/1
875 TABLET, FILM COATED ORAL 2 TIMES DAILY
Qty: 20 TAB | Refills: 0 | Status: SHIPPED | OUTPATIENT
Start: 2019-02-09 | End: 2019-02-19

## 2019-02-09 NOTE — DISCHARGE INSTRUCTIONS
Patient Education        Upper Respiratory Infection (Cold): Care Instructions  Your Care Instructions    An upper respiratory infection, or URI, is an infection of the nose, sinuses, or throat. URIs are spread by coughs, sneezes, and direct contact. The common cold is the most frequent kind of URI. The flu and sinus infections are other kinds of URIs. Almost all URIs are caused by viruses. Antibiotics won't cure them. But you can treat most infections with home care. This may include drinking lots of fluids and taking over-the-counter pain medicine. You will probably feel better in 4 to 10 days. The doctor has checked you carefully, but problems can develop later. If you notice any problems or new symptoms, get medical treatment right away. Follow-up care is a key part of your treatment and safety. Be sure to make and go to all appointments, and call your doctor if you are having problems. It's also a good idea to know your test results and keep a list of the medicines you take. How can you care for yourself at home? · To prevent dehydration, drink plenty of fluids, enough so that your urine is light yellow or clear like water. Choose water and other caffeine-free clear liquids until you feel better. If you have kidney, heart, or liver disease and have to limit fluids, talk with your doctor before you increase the amount of fluids you drink. · Take an over-the-counter pain medicine, such as acetaminophen (Tylenol), ibuprofen (Advil, Motrin), or naproxen (Aleve). Read and follow all instructions on the label. · Before you use cough and cold medicines, check the label. These medicines may not be safe for young children or for people with certain health problems. · Be careful when taking over-the-counter cold or flu medicines and Tylenol at the same time. Many of these medicines have acetaminophen, which is Tylenol. Read the labels to make sure that you are not taking more than the recommended dose.  Too much acetaminophen (Tylenol) can be harmful. · Get plenty of rest.  · Do not smoke or allow others to smoke around you. If you need help quitting, talk to your doctor about stop-smoking programs and medicines. These can increase your chances of quitting for good. When should you call for help? Call 911 anytime you think you may need emergency care. For example, call if:    · You have severe trouble breathing.    Call your doctor now or seek immediate medical care if:    · You seem to be getting much sicker.     · You have new or worse trouble breathing.     · You have a new or higher fever.     · You have a new rash.    Watch closely for changes in your health, and be sure to contact your doctor if:    · You have a new symptom, such as a sore throat, an earache, or sinus pain.     · You cough more deeply or more often, especially if you notice more mucus or a change in the color of your mucus.     · You do not get better as expected. Where can you learn more? Go to http://javier-won.info/. Enter V223 in the search box to learn more about \"Upper Respiratory Infection (Cold): Care Instructions. \"  Current as of: September 5, 2018  Content Version: 11.9  © 0056-9848 [employer]. Care instructions adapted under license by [employer] (which disclaims liability or warranty for this information). If you have questions about a medical condition or this instruction, always ask your healthcare professional. [place] [address] any warranty or liability for your use of this information.

## 2019-02-09 NOTE — ED NOTES
Emergency Department Nursing Plan of Care The Nursing Plan of Care is developed from the Nursing assessment and Emergency Department Attending provider initial evaluation. The plan of care may be reviewed in the ED Provider note. The Plan of Care was developed with the following considerations:  
Patient / Family readiness to learn indicated by:verbalized understanding Persons(s) to be included in education: patient Barriers to Learning/Limitations:No 
 
Signed Elicia Mauro RN   
2/9/2019   5:04 PM

## 2019-02-11 NOTE — ED PROVIDER NOTES
EMERGENCY DEPARTMENT HISTORY AND PHYSICAL EXAM 
 
Date: 2/9/2019 Patient Name: Juancho Head History of Presenting Illness Chief Complaint Patient presents with  Nasal Congestion  
  pt c/o cold,sinus infection x 2 days,denies n/v/d,fever,hills. History Provided By: Patient Chief Complaint: sinus pain Duration: 2 Days Timing:  Acute Location: frontal sinus pressue Quality: Pressure Severity: 5 out of 10 Modifying Factors: none Associated Symptoms: nasal congestion cough HPI: Juancho Head is a 68 y.o. female with a PMH of arthritis pancreatitis who presents with nasal congestion and sinus pressure for 2 days. Patient specifically denies fever fatigue chest pain shortness of breath abdominal pain nausea vomiitng diarrhea dysuria numbness headache denies sick contacts. reports cough. PCP: None Current Outpatient Medications Medication Sig Dispense Refill  amoxicillin (AMOXIL) 875 mg tablet Take 1 Tab by mouth two (2) times a day for 10 days. 20 Tab 0  
 guaiFENesin (ROBITUSSIN) 100 mg/5 mL liquid Take 20 mL by mouth three (3) times daily as needed for Cough. 1 Bottle 0  
 carvedilol (COREG) 25 mg tablet Take 25 mg by mouth two (2) times daily (with meals).  allopurinol (ZYLOPRIM) 100 mg tablet Take 100 mg by mouth daily.  atorvastatin (LIPITOR) 80 mg tablet Take 80 mg by mouth daily.  furosemide (LASIX) 40 mg tablet Take 40 mg by mouth daily.  clopidogrel (PLAVIX) 75 mg tab Take 75 mg by mouth.  traMADol (ULTRAM) 50 mg tablet Take  mg by mouth daily as needed for Pain. Past History Past Medical History: 
History reviewed. No pertinent past medical history. Past Surgical History: 
Past Surgical History:  
Procedure Laterality Date  CARDIAC SURG PROCEDURE UNLIST    
 pacemaker/defibrilator placed 2 2015  HX GYN    
 hysterectomy Family History: 
Family History Problem Relation Age of Onset  Heart Disease Other  Kidney Disease Other Social History: 
Social History Tobacco Use  Smoking status: Former Smoker Substance Use Topics  Alcohol use: No  
 Drug use: No  
 
 
Allergies: 
No Known Allergies Review of Systems Review of Systems Constitutional: Negative for fatigue and fever. HENT: Positive for rhinorrhea and sinus pain. Respiratory: Positive for cough. Negative for shortness of breath and wheezing. Cardiovascular: Negative for chest pain and palpitations. Gastrointestinal: Negative for abdominal pain. Musculoskeletal: Negative for arthralgias, myalgias, neck pain and neck stiffness. Skin: Negative for pallor and rash. Neurological: Negative for dizziness, tremors, weakness and headaches. Hematological: Negative for adenopathy. All other systems reviewed and are negative. Physical Exam  
 
Vitals:  
 02/09/19 1645 BP: 150/88 Pulse: 82 Resp: 17 Temp: 97.2 °F (36.2 °C) SpO2: 99% Weight: 97.5 kg (215 lb) Height: 5' (1.524 m) Physical Exam  
Constitutional: She is oriented to person, place, and time. She appears well-developed and well-nourished. No distress. HENT:  
Head: Normocephalic and atraumatic. Right Ear: External ear normal.  
Left Ear: External ear normal.  
Nose: Nose normal.  
Mouth/Throat: Oropharynx is clear and moist.  
Frontal sinus pressure. Nasally congested Eyes: Conjunctivae are normal.  
Neck: Normal range of motion. Neck supple. Cardiovascular: Normal rate, regular rhythm and normal heart sounds. Pulmonary/Chest: Effort normal and breath sounds normal. No respiratory distress. She has no wheezes. Abdominal: Soft. Bowel sounds are normal. There is no tenderness. Musculoskeletal: Normal range of motion. Lymphadenopathy:  
  She has no cervical adenopathy. Neurological: She is alert and oriented to person, place, and time. No cranial nerve deficit.  Coordination normal.  
 Skin: Skin is warm and dry. No rash noted. Psychiatric: She has a normal mood and affect. Her behavior is normal. Judgment and thought content normal.  
Nursing note and vitals reviewed. Diagnostic Study Results Labs - No results found for this or any previous visit (from the past 12 hour(s)). Radiologic Studies - No orders to display CT Results  (Last 48 hours) None CXR Results  (Last 48 hours) None Medical Decision Making I am the first provider for this patient. I reviewed the vital signs, available nursing notes, past medical history, past surgical history, family history and social history. Vital Signs-Reviewed the patient's vital signs. Records Reviewed: Nursing Notes and Old Medical Records Disposition: 
home DISCHARGE NOTE:  
 
 
 
  Care plan outlined and precautions discussed. Patient has no new complaints, changes, or physical findings. . All medications were reviewed with the patient; will d/c home with amoxicillin. All of pt's questions and concerns were addressed. Patient was instructed and agrees to follow up with amoxicillin, as well as to return to the ED upon further deterioration. Patient is ready to go home. Follow-up Information Follow up With Specialties Details Why Contact [facility]  In 2 days  [address] [facility] [address] [facility] [address] 
[phone] Discharge Medication List as of 2/9/2019  5:41 PM  
  
CONTINUE these medications which have NOT CHANGED Details  
carvedilol (COREG) 25 mg tablet Take 25 mg by mouth two (2) times daily (with meals). , Historical Med  
  
allopurinol (ZYLOPRIM) 100 mg tablet Take 100 mg by mouth daily. , Historical Med  
  
atorvastatin (LIPITOR) 80 mg tablet Take 80 mg by mouth daily. , Historical Med  
  
 furosemide (LASIX) 40 mg tablet Take 40 mg by mouth daily. , Historical Med  
  
clopidogrel (PLAVIX) 75 mg tab Take 75 mg by mouth., Historical Med  
  
traMADol (ULTRAM) 50 mg tablet Take  mg by mouth daily as needed for Pain., Historical Med  
  
  
 
 
Provider Notes (Medical Decision Making): DDX URI sinusitis allergic rhinitis Procedures: 
Procedures Diagnosis Clinical Impression: 1.  Acute URI

## 2019-12-24 ENCOUNTER — APPOINTMENT (OUTPATIENT)
Dept: GENERAL RADIOLOGY | Age: 74
DRG: 280 | End: 2019-12-24
Attending: EMERGENCY MEDICINE
Payer: MEDICARE

## 2019-12-24 ENCOUNTER — APPOINTMENT (OUTPATIENT)
Dept: NON INVASIVE DIAGNOSTICS | Age: 74
DRG: 280 | End: 2019-12-24
Attending: HOSPITALIST
Payer: MEDICARE

## 2019-12-24 ENCOUNTER — HOSPITAL ENCOUNTER (INPATIENT)
Age: 74
LOS: 3 days | Discharge: HOME | DRG: 280 | End: 2019-12-27
Attending: EMERGENCY MEDICINE | Admitting: HOSPITALIST
Payer: MEDICARE

## 2019-12-24 DIAGNOSIS — E83.42 HYPOMAGNESEMIA: ICD-10-CM

## 2019-12-24 DIAGNOSIS — E87.6 HYPOKALEMIA: ICD-10-CM

## 2019-12-24 DIAGNOSIS — I21.4 NSTEMI (NON-ST ELEVATED MYOCARDIAL INFARCTION) (HCC): ICD-10-CM

## 2019-12-24 DIAGNOSIS — I50.9 ACUTE HEART FAILURE, UNSPECIFIED HEART FAILURE TYPE (HCC): ICD-10-CM

## 2019-12-24 DIAGNOSIS — Z45.02 DEFIBRILLATOR DISCHARGE: Primary | ICD-10-CM

## 2019-12-24 LAB
ALBUMIN SERPL-MCNC: 3.2 G/DL (ref 3.5–5)
ALBUMIN/GLOB SERPL: 0.8 {RATIO} (ref 1.1–2.2)
ALP SERPL-CCNC: 88 U/L (ref 45–117)
ALT SERPL-CCNC: 22 U/L (ref 12–78)
ANION GAP SERPL CALC-SCNC: 9 MMOL/L (ref 5–15)
APTT PPP: 24.4 SEC (ref 22.1–32)
APTT PPP: 49.8 SEC (ref 22.1–32)
AST SERPL-CCNC: 34 U/L (ref 15–37)
ATRIAL RATE: 108 BPM
BILIRUB SERPL-MCNC: 0.6 MG/DL (ref 0.2–1)
BUN SERPL-MCNC: 11 MG/DL (ref 6–20)
BUN/CREAT SERPL: 9 (ref 12–20)
CALCIUM SERPL-MCNC: 8.5 MG/DL (ref 8.5–10.1)
CALCULATED R AXIS, ECG10: -31 DEGREES
CALCULATED T AXIS, ECG11: 44 DEGREES
CHLORIDE SERPL-SCNC: 98 MMOL/L (ref 97–108)
CK MB CFR SERPL CALC: 1.2 % (ref 0–2.5)
CK MB SERPL-MCNC: 6.4 NG/ML (ref 5–25)
CK SERPL-CCNC: 539 U/L (ref 26–192)
CO2 SERPL-SCNC: 32 MMOL/L (ref 21–32)
CREAT SERPL-MCNC: 1.23 MG/DL (ref 0.55–1.02)
DIAGNOSIS, 93000: NORMAL
ERYTHROCYTE [DISTWIDTH] IN BLOOD BY AUTOMATED COUNT: 15.1 % (ref 11.5–14.5)
ERYTHROCYTE [DISTWIDTH] IN BLOOD BY AUTOMATED COUNT: 15.4 % (ref 11.5–14.5)
GLOBULIN SER CALC-MCNC: 4.2 G/DL (ref 2–4)
GLUCOSE SERPL-MCNC: 113 MG/DL (ref 65–100)
HCT VFR BLD AUTO: 32 % (ref 35–47)
HCT VFR BLD AUTO: 36.7 % (ref 35–47)
HGB BLD-MCNC: 10.4 G/DL (ref 11.5–16)
HGB BLD-MCNC: 12 G/DL (ref 11.5–16)
MAGNESIUM SERPL-MCNC: 1.5 MG/DL (ref 1.6–2.4)
MCH RBC QN AUTO: 31 PG (ref 26–34)
MCH RBC QN AUTO: 31 PG (ref 26–34)
MCHC RBC AUTO-ENTMCNC: 32.5 G/DL (ref 30–36.5)
MCHC RBC AUTO-ENTMCNC: 32.7 G/DL (ref 30–36.5)
MCV RBC AUTO: 94.8 FL (ref 80–99)
MCV RBC AUTO: 95.2 FL (ref 80–99)
NRBC # BLD: 0 K/UL (ref 0–0.01)
NRBC # BLD: 0 K/UL (ref 0–0.01)
NRBC BLD-RTO: 0 PER 100 WBC
NRBC BLD-RTO: 0 PER 100 WBC
PLATELET # BLD AUTO: 261 K/UL (ref 150–400)
PLATELET # BLD AUTO: 263 K/UL (ref 150–400)
PMV BLD AUTO: 9.4 FL (ref 8.9–12.9)
PMV BLD AUTO: 9.4 FL (ref 8.9–12.9)
POTASSIUM SERPL-SCNC: 3.2 MMOL/L (ref 3.5–5.1)
PROT SERPL-MCNC: 7.4 G/DL (ref 6.4–8.2)
Q-T INTERVAL, ECG07: 478 MS
QRS DURATION, ECG06: 96 MS
QTC CALCULATION (BEZET), ECG08: 643 MS
RBC # BLD AUTO: 3.36 M/UL (ref 3.8–5.2)
RBC # BLD AUTO: 3.87 M/UL (ref 3.8–5.2)
SODIUM SERPL-SCNC: 139 MMOL/L (ref 136–145)
THERAPEUTIC RANGE,PTTT: ABNORMAL SECS (ref 58–77)
THERAPEUTIC RANGE,PTTT: NORMAL SECS (ref 58–77)
TROPONIN I SERPL-MCNC: 0.95 NG/ML
TROPONIN I SERPL-MCNC: 1.07 NG/ML
TROPONIN I SERPL-MCNC: 1.43 NG/ML
VENTRICULAR RATE, ECG03: 109 BPM
WBC # BLD AUTO: 4.9 K/UL (ref 3.6–11)
WBC # BLD AUTO: 5.4 K/UL (ref 3.6–11)

## 2019-12-24 PROCEDURE — 65660000000 HC RM CCU STEPDOWN

## 2019-12-24 PROCEDURE — 93005 ELECTROCARDIOGRAM TRACING: CPT

## 2019-12-24 PROCEDURE — 85730 THROMBOPLASTIN TIME PARTIAL: CPT

## 2019-12-24 PROCEDURE — 71045 X-RAY EXAM CHEST 1 VIEW: CPT

## 2019-12-24 PROCEDURE — 74011250636 HC RX REV CODE- 250/636: Performed by: HOSPITALIST

## 2019-12-24 PROCEDURE — 99285 EMERGENCY DEPT VISIT HI MDM: CPT

## 2019-12-24 PROCEDURE — 93306 TTE W/DOPPLER COMPLETE: CPT

## 2019-12-24 PROCEDURE — 74011250637 HC RX REV CODE- 250/637: Performed by: HOSPITALIST

## 2019-12-24 PROCEDURE — 83735 ASSAY OF MAGNESIUM: CPT

## 2019-12-24 PROCEDURE — 82550 ASSAY OF CK (CPK): CPT

## 2019-12-24 PROCEDURE — 85027 COMPLETE CBC AUTOMATED: CPT

## 2019-12-24 PROCEDURE — 84484 ASSAY OF TROPONIN QUANT: CPT

## 2019-12-24 PROCEDURE — 74011250636 HC RX REV CODE- 250/636: Performed by: EMERGENCY MEDICINE

## 2019-12-24 PROCEDURE — 96374 THER/PROPH/DIAG INJ IV PUSH: CPT

## 2019-12-24 PROCEDURE — 36415 COLL VENOUS BLD VENIPUNCTURE: CPT

## 2019-12-24 PROCEDURE — 80053 COMPREHEN METABOLIC PANEL: CPT

## 2019-12-24 PROCEDURE — 74011250637 HC RX REV CODE- 250/637: Performed by: EMERGENCY MEDICINE

## 2019-12-24 RX ORDER — CARVEDILOL 12.5 MG/1
12.5 TABLET ORAL 2 TIMES DAILY WITH MEALS
Status: DISCONTINUED | OUTPATIENT
Start: 2019-12-24 | End: 2019-12-27 | Stop reason: HOSPADM

## 2019-12-24 RX ORDER — HEPARIN SODIUM 5000 [USP'U]/ML
4000 INJECTION, SOLUTION INTRAVENOUS; SUBCUTANEOUS
Status: DISCONTINUED | OUTPATIENT
Start: 2019-12-24 | End: 2019-12-26

## 2019-12-24 RX ORDER — TRAMADOL HYDROCHLORIDE 50 MG/1
50-100 TABLET ORAL
Status: DISCONTINUED | OUTPATIENT
Start: 2019-12-24 | End: 2019-12-27 | Stop reason: HOSPADM

## 2019-12-24 RX ORDER — DOCUSATE SODIUM 100 MG/1
100 CAPSULE, LIQUID FILLED ORAL 2 TIMES DAILY
Status: DISCONTINUED | OUTPATIENT
Start: 2019-12-24 | End: 2019-12-27 | Stop reason: HOSPADM

## 2019-12-24 RX ORDER — NALOXONE HYDROCHLORIDE 0.4 MG/ML
0.4 INJECTION, SOLUTION INTRAMUSCULAR; INTRAVENOUS; SUBCUTANEOUS AS NEEDED
Status: DISCONTINUED | OUTPATIENT
Start: 2019-12-24 | End: 2019-12-27 | Stop reason: HOSPADM

## 2019-12-24 RX ORDER — SODIUM CHLORIDE 0.9 % (FLUSH) 0.9 %
5-40 SYRINGE (ML) INJECTION EVERY 8 HOURS
Status: DISCONTINUED | OUTPATIENT
Start: 2019-12-24 | End: 2019-12-27 | Stop reason: SDUPTHER

## 2019-12-24 RX ORDER — SODIUM CHLORIDE 0.9 % (FLUSH) 0.9 %
5-40 SYRINGE (ML) INJECTION AS NEEDED
Status: DISCONTINUED | OUTPATIENT
Start: 2019-12-24 | End: 2019-12-27 | Stop reason: SDUPTHER

## 2019-12-24 RX ORDER — ATORVASTATIN CALCIUM 40 MG/1
80 TABLET, FILM COATED ORAL
Status: DISCONTINUED | OUTPATIENT
Start: 2019-12-24 | End: 2019-12-27 | Stop reason: HOSPADM

## 2019-12-24 RX ORDER — ONDANSETRON 2 MG/ML
4 INJECTION INTRAMUSCULAR; INTRAVENOUS
Status: DISCONTINUED | OUTPATIENT
Start: 2019-12-24 | End: 2019-12-27 | Stop reason: HOSPADM

## 2019-12-24 RX ORDER — ASPIRIN 81 MG/1
81 TABLET ORAL DAILY
COMMUNITY

## 2019-12-24 RX ORDER — BISMUTH SUBSALICYLATE 262 MG
1 TABLET,CHEWABLE ORAL DAILY
COMMUNITY

## 2019-12-24 RX ORDER — HEPARIN SODIUM 5000 [USP'U]/ML
2000 INJECTION, SOLUTION INTRAVENOUS; SUBCUTANEOUS
Status: DISCONTINUED | OUTPATIENT
Start: 2019-12-24 | End: 2019-12-26

## 2019-12-24 RX ORDER — ACETAMINOPHEN 325 MG/1
650 TABLET ORAL
Status: DISCONTINUED | OUTPATIENT
Start: 2019-12-24 | End: 2019-12-26

## 2019-12-24 RX ORDER — ASPIRIN 81 MG/1
81 TABLET ORAL DAILY
Status: DISCONTINUED | OUTPATIENT
Start: 2019-12-25 | End: 2019-12-27 | Stop reason: HOSPADM

## 2019-12-24 RX ORDER — MAGNESIUM SULFATE HEPTAHYDRATE 40 MG/ML
2 INJECTION, SOLUTION INTRAVENOUS
Status: COMPLETED | OUTPATIENT
Start: 2019-12-24 | End: 2019-12-24

## 2019-12-24 RX ORDER — THERA TABS 400 MCG
1 TAB ORAL DAILY
Status: DISCONTINUED | OUTPATIENT
Start: 2019-12-25 | End: 2019-12-27 | Stop reason: HOSPADM

## 2019-12-24 RX ORDER — ALLOPURINOL 100 MG/1
100 TABLET ORAL DAILY
Status: DISCONTINUED | OUTPATIENT
Start: 2019-12-25 | End: 2019-12-27 | Stop reason: HOSPADM

## 2019-12-24 RX ORDER — POTASSIUM CHLORIDE 20 MEQ/1
40 TABLET, EXTENDED RELEASE ORAL
Status: COMPLETED | OUTPATIENT
Start: 2019-12-24 | End: 2019-12-24

## 2019-12-24 RX ORDER — GUAIFENESIN 100 MG/5ML
324 LIQUID (ML) ORAL
Status: COMPLETED | OUTPATIENT
Start: 2019-12-24 | End: 2019-12-24

## 2019-12-24 RX ORDER — CLOPIDOGREL BISULFATE 75 MG/1
75 TABLET ORAL DAILY
Status: DISCONTINUED | OUTPATIENT
Start: 2019-12-24 | End: 2019-12-27 | Stop reason: HOSPADM

## 2019-12-24 RX ORDER — FUROSEMIDE 40 MG/1
40 TABLET ORAL DAILY
Status: DISCONTINUED | OUTPATIENT
Start: 2019-12-24 | End: 2019-12-27 | Stop reason: HOSPADM

## 2019-12-24 RX ORDER — TRAMADOL HYDROCHLORIDE 50 MG/1
50 TABLET ORAL
Status: COMPLETED | OUTPATIENT
Start: 2019-12-24 | End: 2019-12-24

## 2019-12-24 RX ORDER — HEPARIN SODIUM 5000 [USP'U]/ML
4000 INJECTION, SOLUTION INTRAVENOUS; SUBCUTANEOUS ONCE
Status: COMPLETED | OUTPATIENT
Start: 2019-12-24 | End: 2019-12-24

## 2019-12-24 RX ORDER — FLUTICASONE PROPIONATE 50 MCG
2 SPRAY, SUSPENSION (ML) NASAL DAILY
COMMUNITY

## 2019-12-24 RX ORDER — POTASSIUM CHLORIDE 750 MG/1
40 TABLET, FILM COATED, EXTENDED RELEASE ORAL
Status: DISCONTINUED | OUTPATIENT
Start: 2019-12-24 | End: 2019-12-24

## 2019-12-24 RX ORDER — HEPARIN SODIUM 10000 [USP'U]/100ML
10-25 INJECTION, SOLUTION INTRAVENOUS
Status: DISCONTINUED | OUTPATIENT
Start: 2019-12-24 | End: 2019-12-26

## 2019-12-24 RX ADMIN — Medication 10 ML: at 21:51

## 2019-12-24 RX ADMIN — DOCUSATE SODIUM 100 MG: 100 CAPSULE, LIQUID FILLED ORAL at 17:04

## 2019-12-24 RX ADMIN — TRAMADOL HYDROCHLORIDE 50 MG: 50 TABLET, FILM COATED ORAL at 11:08

## 2019-12-24 RX ADMIN — TRAMADOL HYDROCHLORIDE 100 MG: 50 TABLET, FILM COATED ORAL at 16:58

## 2019-12-24 RX ADMIN — FUROSEMIDE 40 MG: 40 TABLET ORAL at 15:43

## 2019-12-24 RX ADMIN — CLOPIDOGREL BISULFATE 75 MG: 75 TABLET ORAL at 15:44

## 2019-12-24 RX ADMIN — HEPARIN SODIUM 2000 UNITS: 5000 INJECTION INTRAVENOUS; SUBCUTANEOUS at 23:53

## 2019-12-24 RX ADMIN — CARVEDILOL 12.5 MG: 12.5 TABLET, FILM COATED ORAL at 16:58

## 2019-12-24 RX ADMIN — HEPARIN SODIUM 10 UNITS/KG/HR: 10000 INJECTION, SOLUTION INTRAVENOUS at 16:00

## 2019-12-24 RX ADMIN — MAGNESIUM SULFATE HEPTAHYDRATE 2 G: 40 INJECTION, SOLUTION INTRAVENOUS at 11:08

## 2019-12-24 RX ADMIN — AMIODARONE HYDROCHLORIDE 0.5 MG/MIN: 50 INJECTION, SOLUTION INTRAVENOUS at 23:55

## 2019-12-24 RX ADMIN — HEPARIN SODIUM 4000 UNITS: 5000 INJECTION INTRAVENOUS; SUBCUTANEOUS at 16:12

## 2019-12-24 RX ADMIN — AMIODARONE HYDROCHLORIDE 150 MG: 1.5 INJECTION, SOLUTION INTRAVENOUS at 15:46

## 2019-12-24 RX ADMIN — ATORVASTATIN CALCIUM 80 MG: 40 TABLET, FILM COATED ORAL at 21:50

## 2019-12-24 RX ADMIN — AMIODARONE HYDROCHLORIDE 1 MG/MIN: 50 INJECTION, SOLUTION INTRAVENOUS at 16:14

## 2019-12-24 RX ADMIN — Medication 10 ML: at 16:15

## 2019-12-24 RX ADMIN — POTASSIUM CHLORIDE 40 MEQ: 20 TABLET, EXTENDED RELEASE ORAL at 11:08

## 2019-12-24 RX ADMIN — ASPIRIN 81 MG 324 MG: 81 TABLET ORAL at 15:43

## 2019-12-24 NOTE — Clinical Note
left ventricle obtained using power injection. Total volume = 39 mL. Rate = 13 mL/sec. Pressure = 900 PSI.

## 2019-12-24 NOTE — PROGRESS NOTES
Pharmacy Clarification of Prior to Admission Medication Regimen     The patient was interviewed regarding clarification of the prior to admission medication regimen. Patient's granddaughters were present in room and obtained permission from patient to discuss drug regimen with visitor(s) present. Patient was questioned regarding use of any other inhalers, topical products, over the counter medications, herbal medications, vitamin products or ophthalmic/nasal/otic medication use. Information Obtained From: Patient    Pertinent Pharmacy Findings:  During interview, patient's granddaughters stated that they are in the process of moving their grandmother to a senior home and found multiply full bottles of the same medications. Granddaughters stated that the patient is a \"hoarder\"  and hangs on to medications. PTA medication list was corrected to the following:     Prior to Admission Medications   Prescriptions Last Dose Informant Taking?   allopurinol (ZYLOPRIM) 100 mg tablet 2019 at Unknown time Self Yes   Sig: Take 100 mg by mouth daily. atorvastatin (LIPITOR) 80 mg tablet 2019 at Unknown time Self Yes   Sig: Take 80 mg by mouth daily. carvedilol (COREG) 25 mg tablet 2019 at Unknown time Self Yes   Sig: Take 25 mg by mouth two (2) times daily (with meals). clopidogrel (PLAVIX) 75 mg tab 2019 at Unknown time Self Yes   Sig: Take 75 mg by mouth. fluticasone propionate (FLONASE ALLERGY RELIEF) 50 mcg/actuation nasal spray 2019 at Unknown time Self Yes   Si Sprays by Both Nostrils route daily. furosemide (LASIX) 40 mg tablet 2019 at Unknown time Self Yes   Sig: Take 40 mg by mouth daily. multivitamin (ONE A DAY) tablet 2019 at Unknown time Self Yes   Sig: Take 1 Tab by mouth daily. traMADol (ULTRAM) 50 mg tablet 2019 at Unknown time Self Yes   Sig: Take  mg by mouth two (2) times daily as needed for Pain.       Facility-Administered Medications: None          Thank you,  Michell Sultana  Medication History Pharmacy Technician

## 2019-12-24 NOTE — Clinical Note
TRANSFER - OUT REPORT:     Verbal report given to: Nena Woo. Report consisted of patient's Situation, Background, Assessment and   Recommendations(SBAR). Opportunity for questions and clarification was provided. Patient transported with a Registered Nurse.

## 2019-12-24 NOTE — H&P
Hospitalist Admission Note    NAME: Tammy Ling   :  1945   MRN:  [id number]     Date/Time:  2019 2:15 PM    Patient PCP: Johnson Simmonds, MD   Cardiology:  Dr. eJssi Aceves, Dr. Gerber Penaloza  ______________________________________________________________________   Assessment & Plan:  AICD firing x 3 due to runs of Vfib and Vtach on interrogation  NSTEMI trop 1.07  Hx CAD s/p CABG , hx stent 2017  Ischemic CMO EF 20%, seems compensated from volume standpoint  Hypokalemia 3.2  Hypomagnesemia  CKD stage 3, Cr 1.1 to 1.2 baseline  Obesity  Gout    --put on amiodarone bolus and drip. --give aspirin x 4 now, continue plavix, start heparin drip. Serial cardiac enzymes, echo. Consult cardiology. Continue statin. --replace potassium and magnesium. --continue lasix po.  --reduce coreg from 25mg to 12.5mg due to low BP.  --not clear why she is not on ACE-I but will not start now since BP is low 94/57 currently. --continue allopurinol    Body mass index is 42.18 kg/m². Code: discussed, wants full code  DVT prophylaxis: heparin drip  Surrogate decision maker:  Granddaughter CASANDRA        Subjective:   CHIEF COMPLAINT:  Chest tightness, SOB, AICD fired x 3    HISTORY OF PRESENT ILLNESS:     Tammy Ling is a 69 y.o. female with CAD s/p CABG , hx nstemi 2017 with stenting, chronic systolic chf with cardiomyopathy EF 20% 2017, s/p pacemaker and AICD presents with complaint noted above. At 8am today was walking up a flight of stairs when she developed severe tightness in chest.  Was SOB last night and this morning. She then felt AICD fired 3 times. Currently chest tightness 1-2/10. She has not taken any meds today at home PTA. Her activity level been limited by arthritis, especially in right hip. Uses a cane. In ER, trop 1.07. AICD interrogated and showing episodes of Vfib and Vtach. We were asked to admit for work up and evaluation of the above problems.      Past Medical History:   Diagnosis Date    CAD (coronary artery disease)     Heart failure ([facility] [address]     Hypertension       Past Surgical History:   Procedure Laterality Date    CARDIAC SURG PROCEDURE UNLIST      pacemaker/defibrilator placed 2 2015    HX GYN      hysterectomy     Social History     Tobacco Use    Smoking status: Former Smoker   Substance Use Topics    Alcohol use: No   Lives alone. Family History   Problem Relation Age of Onset    Heart Disease Other     Kidney Disease Other      No Known Allergies     Prior to Admission medications    Medication Sig Start Date End Date Taking? Authorizing Provider   fluticasone propionate (FLONASE ALLERGY RELIEF) 50 mcg/actuation nasal spray 2 Sprays by Both Nostrils route daily. Yes Provider, Historical   multivitamin (ONE A DAY) tablet Take 1 Tab by mouth daily. Yes Provider, Historical   carvedilol (COREG) 25 mg tablet Take 25 mg by mouth two (2) times daily (with meals). Yes Other, MD Angelina   allopurinol (ZYLOPRIM) 100 mg tablet Take 100 mg by mouth daily. Yes Other, MD Angelina   atorvastatin (LIPITOR) 80 mg tablet Take 80 mg by mouth daily. Yes Other, MD Angelina   furosemide (LASIX) 40 mg tablet Take 40 mg by mouth daily. Yes Other, MD Angelina   clopidogrel (PLAVIX) 75 mg tab Take 75 mg by mouth. Yes Other, MD Angelina   traMADol (ULTRAM) 50 mg tablet Take  mg by mouth two (2) times daily as needed for Pain. Yes Provider, Historical     REVIEW OF SYSTEMS:  POSITIVE= Bold.   Negative = normal text  General:  fever, chills, sweats, generalized weakness, weight loss/gain, loss of appetite  Eyes:  blurred vision, eye pain, loss of vision, diplopia  Ear Nose and Throat:  rhinorrhea, pharyngitis  Respiratory:   cough, sputum production, SOB, wheezing, STEPHENSON, pleuritic pain  Cardiology:  chest pain, palpitations, orthopnea, PND, edema, syncope   Gastrointestinal:  abdominal pain, N/V, dysphagia, diarrhea, constipation, bleeding  Genitourinary: frequency, urgency, dysuria, hematuria, incontinence  Muskuloskeletal :  arthralgia, myalgia  Hematology:  easy bruising, bleeding, lymphadenopathy  Dermatological:  rash, ulceration, pruritis  Endocrine:  hot flashes or polydipsia  Neurological:  headache, dizziness, confusion, focal weakness, paresthesia, memory loss, gait disturbance  Psychological: anxiety, depression, agitation      Objective:   VITALS:    Visit Vitals  /81   Pulse (!) 101   Temp 98 °F (36.7 °C)   Resp 20   Wt 98 kg (216 lb)   SpO2 97%   BMI 42.18 kg/m²     Temp (24hrs), Av °F (36.7 °C), Min:98 °F (36.7 °C), Max:98 °F (36.7 °C)    Body mass index is 42.18 kg/m². PHYSICAL EXAM:    General:    Alert, obese female, cooperative, no distress, appears stated age. HEENT: Atraumatic, anicteric sclerae, pink conjunctivae     No oral ulcers, mucosa moist, throat clear. Hearing intact. Neck:  Supple, symmetrical,  thyroid: non tender  Lungs:   Clear to auscultation bilaterally. No Wheezing or Rhonchi. No rales. Chest wall:  No tenderness  No Accessory muscle use. Heart:   Regular  rhythm,  No  murmur   No gallop. No edema. Abdomen:   Soft, non-tender. Not distended. Bowel sounds normal. No masses  Extremities: No cyanosis. No clubbing  Skin:     Not pale Not Jaundiced  No rashes   Psych:  Good insight. Not depressed. Not anxious or agitated. Neurologic: EOMs intact. No facial asymmetry. No aphasia or slurred speech. Symmetrical strength, Alert and oriented X 3.      IMAGING RESULTS:   []       I have personally reviewed the actual   []     CXR  []     CT scan  CXR:  CT :  EKG:   ________________________________________________________________________  Care Plan discussed with:    Comments   Patient y    [facility]                    Consultant:      ________________________________________________________________________  Prophylaxis:  GI none   DVT Heparin drip ________________________________________________________________________  Recommended Disposition:   Home with Family y   HH/PT/OT/RN y   SNF/LTC    RAYRAY    ________________________________________________________________________  Code Status:  Full Code y   DNR/DNI    ________________________________________________________________________  TOTAL TIME: 60 minutes      Comments    y Reviewed previous records       ______________________________________________________________________  Arleth Ramon MD      Procedures: see electronic medical records for all procedures/Xrays and details which were not copied into this note but were reviewed prior to creation of Plan.     LAB DATA REVIEWED:    Recent Results (from the past 24 hour(s))   EKG, 12 LEAD, INITIAL    Collection Time: 12/24/19  9:24 AM   Result Value Ref Range    Ventricular Rate 109 BPM    Atrial Rate 108 BPM    QRS Duration 96 ms    Q-T Interval 478 ms    QTC Calculation (Bezet) 643 ms    Calculated R Axis -31 degrees    Calculated T Axis 44 degrees    Diagnosis       Sinus tachycardia  Left axis deviation  Moderate voltage criteria for LVH, may be normal variant  Nonspecific T wave abnormality  When compared with ECG of 27-MAY-2017 11:18,  premature ventricular complexes are no longer present    Confirmed by Bernadine Rodriguez ([id number]) on 12/24/2019 12:57:14 PM     CBC W/O DIFF    Collection Time: 12/24/19  9:49 AM   Result Value Ref Range    WBC 4.9 3.6 - 11.0 K/uL    RBC 3.87 3.80 - 5.20 M/uL    HGB 12.0 11.5 - 16.0 g/dL    HCT 36.7 35.0 - 47.0 %    MCV 94.8 80.0 - 99.0 FL    MCH 31.0 26.0 - 34.0 PG    MCHC 32.7 30.0 - 36.5 g/dL    RDW 15.4 (H) 11.5 - 14.5 %    PLATELET 168 665 - 225 K/uL    MPV 9.4 8.9 - 12.9 FL    NRBC 0.0 0  WBC    ABSOLUTE NRBC 0.00 0.00 - 7.71 K/uL   METABOLIC PANEL, COMPREHENSIVE    Collection Time: 12/24/19  9:49 AM   Result Value Ref Range    Sodium 139 136 - 145 mmol/L    Potassium 3.2 (L) 3.5 - 5.1 mmol/L    Chloride 98 97 - 108 mmol/L    CO2 32 21 - 32 mmol/L    Anion gap 9 5 - 15 mmol/L    Glucose 113 (H) 65 - 100 mg/dL    BUN 11 6 - 20 MG/DL    Creatinine 1.23 (H) 0.55 - 1.02 MG/DL    BUN/Creatinine ratio 9 (L) 12 - 20      GFR est AA 52 (L) >60 ml/min/1.73m2    GFR est non-AA 43 (L) >60 ml/min/1.73m2    Calcium 8.5 8.5 - 10.1 MG/DL    Bilirubin, total 0.6 0.2 - 1.0 MG/DL    ALT (SGPT) 22 12 - 78 U/L    AST (SGOT) 34 15 - 37 U/L    Alk.  phosphatase 88 45 - 117 U/L    Protein, total 7.4 6.4 - 8.2 g/dL    Albumin 3.2 (L) 3.5 - 5.0 g/dL    Globulin 4.2 (H) 2.0 - 4.0 g/dL    A-G Ratio 0.8 (L) 1.1 - 2.2     MAGNESIUM    Collection Time: 12/24/19  9:49 AM   Result Value Ref Range    Magnesium 1.5 (L) 1.6 - 2.4 mg/dL   TROPONIN I    Collection Time: 12/24/19  9:49 AM   Result Value Ref Range    Troponin-I, Qt. 1.07 (H) <0.05 ng/mL

## 2019-12-24 NOTE — PROGRESS NOTES
Reason for Admission:   NSTEMI                  RRAT Score:     16 moderate risk             Do you (patient/family) have any concerns for transition/discharge? No concerns at this time              Plan for utilizing home health:   Has not used home health in the past    Current Advanced Directive/Advance Care Plan:  none            Transition of Care Plan:          1. Patient in ED bed waiting for inpatient admission  2. Patient will need 2nd IM letter at discharge  3. Patient prefers to discharge home with family assistance and follow up appointments    Patient is a 69year old female admitted 12/24 for NSTEMI. Patient alert and oriented, resting in bed with granddaughters Mariela Gill and Gorge Mon present in room. Demographic information verified and correct. Insurance information verified and correct. Patient lives alone, no home oxygen, uses a cane for ambulation (short distances) and has not used home health in the past.  [facility]. Patient states she is independent with ADLs but her family helps her with meals. Patient does not drive, her granddaughters can transport at discharge    Care Management Interventions  PCP Verified by CM: Dia Padgett MD)  Mode of Transport at Discharge:  Other (see comment)(pt's granddaughter can transport at [facility])  Transition of Care Consult (CM Consult): Discharge Planning  Discharge Durable Medical Equipment: No  Physical Therapy Consult: No  Occupational Therapy Consult: No  Speech Therapy Consult: No  Current Support Network: Own Home, Lives Alone(2nd floor apartment with elevator)  Confirm Follow Up Transport: Family  Discharge Location  Discharge Placement: [address] Montejo, RN, [address] [facility]  [phone]

## 2019-12-24 NOTE — ED NOTES
TRANSFER - OUT REPORT:    Verbal report given to [facility] (name) on Fritz Muñoz  being transferred to PCU (unit) for routine progression of care       Report consisted of patients Situation, Background, Assessment and   Recommendations(SBAR). Information from the following report(s) SBAR, Kardex, ED Summary, [facility] and Recent Results was reviewed with the receiving nurse. Lines:   Peripheral IV 12/24/19 Left Wrist (Active)   Site Assessment Clean, dry, & intact 12/24/2019  9:45 AM   Phlebitis Assessment 0 12/24/2019  9:45 AM   Infiltration Assessment 0 12/24/2019  9:45 AM   Dressing Status Clean, dry, & intact 12/24/2019  9:45 AM   Dressing Type Tape;Transparent 12/24/2019  9:45 AM   Hub Color/Line Status Pink 12/24/2019  9:45 AM       Peripheral IV 12/24/19 Right Forearm (Active)   Site Assessment Clean, dry, & intact 12/24/2019  3:14 PM   Phlebitis Assessment 0 12/24/2019  3:14 PM   Infiltration Assessment 0 12/24/2019  3:14 PM   Dressing Status Clean, dry, & intact 12/24/2019  3:14 PM   Dressing Type Tape;Transparent 12/24/2019  3:14 PM   Hub Color/Line Status Pink 12/24/2019  3:14 PM        Opportunity for questions and clarification was provided.       Patient transported with:   Monitor  Registered Nurse  Tech

## 2019-12-24 NOTE — ED PROVIDER NOTES
EMERGENCY DEPARTMENT HISTORY AND PHYSICAL EXAM      Date: 12/24/2019  Patient Name: Fernando Adames  Patient Age and Sex: 69 y.o. female     History of Presenting Illness     Chief Complaint   Patient presents with    Chest Pain       History Provided By: Patient    HPI: Fernando Adames is a 25-year-old female with a history of CHF, CAD, presenting with defibrillator firing. Patient states that prior to arrival she was walking up the stairs. Had some chest pressure as well as shortness of breath and then felt her defibrillator fire 3 times. States she continues to have some mild 1 out of 10 chest pain but denies any shortness of breath with it. States she did have some nausea but denies any vomiting, diarrhea, fevers recently, cough. Her cardiologist was Dr. Candance Glen at [facility] but he has since retired. Had the defibrillator placed in 2015. There are no other complaints, changes, or physical findings at this time. PCP: Mary Bowen MD    No current facility-administered medications on file prior to encounter. Current Outpatient Medications on File Prior to Encounter   Medication Sig Dispense Refill    fluticasone propionate (FLONASE ALLERGY RELIEF) 50 mcg/actuation nasal spray 2 Sprays by Both Nostrils route daily.  multivitamin (ONE A DAY) tablet Take 1 Tab by mouth daily.  carvedilol (COREG) 25 mg tablet Take 25 mg by mouth two (2) times daily (with meals).  allopurinol (ZYLOPRIM) 100 mg tablet Take 100 mg by mouth daily.  atorvastatin (LIPITOR) 80 mg tablet Take 80 mg by mouth daily.  furosemide (LASIX) 40 mg tablet Take 40 mg by mouth daily.  clopidogrel (PLAVIX) 75 mg tab Take 75 mg by mouth.  traMADol (ULTRAM) 50 mg tablet Take  mg by mouth two (2) times daily as needed for Pain.  [DISCONTINUED] guaiFENesin (ROBITUSSIN) 100 mg/5 mL liquid Take 20 mL by mouth three (3) times daily as needed for Cough.  1 Bottle 0       Past History     Past Medical History:  Past Medical History:   Diagnosis Date    CAD (coronary artery disease)     Heart failure ([address]     Hypertension        Past Surgical History:  Past Surgical History:   Procedure Laterality Date    CARDIAC SURG PROCEDURE UNLIST      pacemaker/defibrilator placed 2 2015    HX GYN      hysterectomy       Family History:  Family History   Problem Relation Age of Onset    Heart Disease Other     Kidney Disease Other        Social History:  Social History     Tobacco Use    Smoking status: Former Smoker   Substance Use Topics    Alcohol use: No    Drug use: No       Allergies:  No Known Allergies      Review of Systems   Review of Systems   Constitutional: Negative for chills and fever. Respiratory: Positive for shortness of breath. Negative for cough. Cardiovascular: Positive for chest pain. Gastrointestinal: Negative for abdominal pain, constipation, diarrhea, nausea and vomiting. Neurological: Negative for weakness and numbness. All other systems reviewed and are negative. Physical Exam   Physical Exam  Vitals signs and nursing note reviewed. Constitutional:       Appearance: She is well-developed. HENT:      Head: Normocephalic and atraumatic. Eyes:      Conjunctiva/sclera: Conjunctivae normal.   Neck:      Musculoskeletal: Normal range of motion and neck supple. Cardiovascular:      Rate and Rhythm: Normal rate and regular rhythm. Pulmonary:      Effort: Pulmonary effort is normal. No respiratory distress. Breath sounds: Normal breath sounds. Abdominal:      General: There is no distension. Palpations: Abdomen is soft. Tenderness: There is no tenderness. Musculoskeletal: Normal range of motion. Skin:     General: Skin is warm and dry. Neurological:      Mental Status: She is alert and oriented to person, place, and time.           Diagnostic Study Results     Labs -     Recent Results (from the past 12 hour(s))   EKG, 12 LEAD, INITIAL Collection Time: 12/24/19  9:24 AM   Result Value Ref Range    Ventricular Rate 109 BPM    Atrial Rate 108 BPM    QRS Duration 96 ms    Q-T Interval 478 ms    QTC Calculation (Bezet) 643 ms    Calculated R Axis -31 degrees    Calculated T Axis 44 degrees    Diagnosis       Sinus tachycardia  Left axis deviation  Moderate voltage criteria for LVH, may be normal variant  Nonspecific T wave abnormality  When compared with ECG of 27-MAY-2017 11:18,  premature ventricular complexes are no longer present    Confirmed by Krys Morgan ([id number]) on 12/24/2019 12:57:14 PM     CBC W/O DIFF    Collection Time: 12/24/19  9:49 AM   Result Value Ref Range    WBC 4.9 3.6 - 11.0 K/uL    RBC 3.87 3.80 - 5.20 M/uL    HGB 12.0 11.5 - 16.0 g/dL    HCT 36.7 35.0 - 47.0 %    MCV 94.8 80.0 - 99.0 FL    MCH 31.0 26.0 - 34.0 PG    MCHC 32.7 30.0 - 36.5 g/dL    RDW 15.4 (H) 11.5 - 14.5 %    PLATELET 452 965 - 752 K/uL    MPV 9.4 8.9 - 12.9 FL    NRBC 0.0 0  WBC    ABSOLUTE NRBC 0.00 0.00 - 1.44 K/uL   METABOLIC PANEL, COMPREHENSIVE    Collection Time: 12/24/19  9:49 AM   Result Value Ref Range    Sodium 139 136 - 145 mmol/L    Potassium 3.2 (L) 3.5 - 5.1 mmol/L    Chloride 98 97 - 108 mmol/L    CO2 32 21 - 32 mmol/L    Anion gap 9 5 - 15 mmol/L    Glucose 113 (H) 65 - 100 mg/dL    BUN 11 6 - 20 MG/DL    Creatinine 1.23 (H) 0.55 - 1.02 MG/DL    BUN/Creatinine ratio 9 (L) 12 - 20      GFR est AA 52 (L) >60 ml/min/1.73m2    GFR est non-AA 43 (L) >60 ml/min/1.73m2    Calcium 8.5 8.5 - 10.1 MG/DL    Bilirubin, total 0.6 0.2 - 1.0 MG/DL    ALT (SGPT) 22 12 - 78 U/L    AST (SGOT) 34 15 - 37 U/L    Alk.  phosphatase 88 45 - 117 U/L    Protein, total 7.4 6.4 - 8.2 g/dL    Albumin 3.2 (L) 3.5 - 5.0 g/dL    Globulin 4.2 (H) 2.0 - 4.0 g/dL    A-G Ratio 0.8 (L) 1.1 - 2.2     MAGNESIUM    Collection Time: 12/24/19  9:49 AM   Result Value Ref Range    Magnesium 1.5 (L) 1.6 - 2.4 mg/dL   TROPONIN I    Collection Time: 12/24/19  9:49 AM   Result Value Ref Range    Troponin-I, Qt. 1.07 (H) <0.05 ng/mL       Radiologic Studies -   XR CHEST PORT   Final Result    impression: Shallow inspiration, elevation of the left hemidiaphragm. CT Results  (Last 48 hours)    None        CXR Results  (Last 48 hours)               12/24/19 1042  XR CHEST PORT Final result    Impression:   impression: Shallow inspiration, elevation of the left hemidiaphragm. Narrative:  Clinical indication: Chest pain. Portable AP erect view of the chest is obtained compared to May 27, 2017. The   inspiration is shallow. There is elevation the left hemidiaphragm. The heart   size is stable but prominent. Prior sternotomy, pacemaker. Medical Decision Making   I am the first provider for this patient. I reviewed the vital signs, available nursing notes, past medical history, past surgical history, family history and social history. Vital Signs-Reviewed the patient's vital signs. Patient Vitals for the past 12 hrs:   Temp Pulse Resp BP SpO2   12/24/19 1300  (!) 101 20 131/81 97 %   12/24/19 1230  (!) 103 21 132/76 97 %   12/24/19 1215  (!) 101 22 149/81 95 %   12/24/19 1200  (!) 101 15 145/74 98 %   12/24/19 1130  (!) 101 14 150/77 100 %   12/24/19 1101  (!) 108 30 135/73 99 %   12/24/19 1015  (!) 103 18 139/82 100 %   12/24/19 0930 98 °F (36.7 °C) (!) 105 16 136/78 99 %       Records Reviewed: Nursing Notes and Old Medical Records    Provider Notes (Medical Decision Making):   Patient presenting with chest pain, shortness of breath in the setting of defibrillator being fired 3 times. Will get interrogation, lab work, troponin and EKG. ED Course:   Initial assessment performed. The patients presenting problems have been discussed, and they are in agreement with the care plan formulated and outlined with them. I have encouraged them to ask questions as they arise throughout their visit.     ED Course as of Dec 24 1411   Tue Dec 24, 2019 8064 EKG interpreted by me. Shows sinus tachycardia with a rate of 109. No ST elevations or depressions concerning for ischemia. [JS]   1560 Patient's potassium 3.2. States that she is on 1 pill of potassium every day. Patient's magnesium also low at 1.5 so giving her IV potassium. Still waiting on [employer] results and troponin. [JS]   7602 Patient's troponin came back at 1.07. Most likely because of the defibrillation that she had earlier today. Still waiting on [employer]. Cp free right now. [JS]   0044 Still have not heard back from [employer]. Attempted to call them but were on hold for 20 minutes. [JS]   1217 Spoke with dr. Enmanuel Pena who is unable to get directly in touch with [employer]. He will see her in consult but recommended admission    [JS]   5041 [employer] paging rep    [JS]   4597 [employer] shows fast vtach episodes. V rate between 210-240. Today happened. 1 of 6 shocks failed    [JS]      ED Course User Index  [JS] Sherif Beatty MD     Critical Care Time:   CRITICAL CARE NOTE :    2:11 PM  IMPENDING DETERIORATION -Cardiovascular and Metabolic  ASSOCIATED RISK FACTORS - Hypotension, Shock, Dysrhythmia and Metabolic changes  MANAGEMENT- Bedside Assessment and Supervision of Care  INTERPRETATION -  Xrays, ECG, Blood Pressure and Cardiac Output Measures   INTERVENTIONS - hemodynamic mngmt and Metobolic interventions  CASE REVIEW - Hospitalist, Medical Sub-Specialist, Nursing and Family  TREATMENT RESPONSE -Stable  PERFORMED BY - Self    NOTES   :    I have spent 35 minutes of critical care time involved in lab review, consultations with specialist, family decision- making, bedside attention and documentation. During this entire length of time I was immediately available to the patient.       Disposition:    Admission Note:  Patient is being admitted to the hospital by Dr. Kuldip Perrin, Service: Hospitalist.  The results of their tests and reasons for their admission have been discussed with them and available family. They convey agreement and understanding for the need to be admitted and for their admission diagnosis. Diagnosis     Clinical Impression:   1. Defibrillator discharge    2. Acute heart failure, unspecified heart failure type ([address]    3. Hypokalemia    4. Hypomagnesemia        Attestations:    Sav Goodwin M.D. Please note that this dictation was completed with eduFire, the computer voice recognition software. Quite often unanticipated grammatical, syntax, homophones, and other interpretive errors are inadvertently transcribed by the computer software. Please disregard these errors. Please excuse any errors that have escaped final proofreading. Thank you.

## 2019-12-25 LAB
ANION GAP SERPL CALC-SCNC: 8 MMOL/L (ref 5–15)
APTT PPP: 41.3 SEC (ref 22.1–32)
APTT PPP: 58.9 SEC (ref 22.1–32)
APTT PPP: 99.9 SEC (ref 22.1–32)
AV PEAK GRADIENT: 62.44 MMHG
AV VELOCITY RATIO: 0.61
BUN SERPL-MCNC: 14 MG/DL (ref 6–20)
BUN/CREAT SERPL: 11 (ref 12–20)
CALCIUM SERPL-MCNC: 8.3 MG/DL (ref 8.5–10.1)
CHLORIDE SERPL-SCNC: 98 MMOL/L (ref 97–108)
CO2 SERPL-SCNC: 32 MMOL/L (ref 21–32)
CREAT SERPL-MCNC: 1.28 MG/DL (ref 0.55–1.02)
ECHO AV AREA PEAK VELOCITY: 2.5 CM2
ECHO AV AREA/BSA PEAK VELOCITY: 1.2 CM2/M2
ECHO AV PEAK GRADIENT: 4.9 MMHG
ECHO AV PEAK VELOCITY: 110.81 CM/S
ECHO AV REGURGITANT PHT: 397.3 CM
ECHO EST RA PRESSURE: 10 MMHG
ECHO LA AREA 4C: 19.4 CM2
ECHO LA MAJOR AXIS: 4.76 CM
ECHO LA VOL 4C: 58.56 ML (ref 22–52)
ECHO LA VOLUME INDEX A4C: 30.36 ML/M2 (ref 16–28)
ECHO LV E' LATERAL VELOCITY: 7.49 CM/S
ECHO LV E' SEPTAL VELOCITY: 3.25 CM/S
ECHO LV EDV TEICHHOLZ: 45.62 ML
ECHO LV ESV TEICHHOLZ: 23.52 ML
ECHO LV INTERNAL DIMENSION DIASTOLIC: 4.51 CM (ref 3.9–5.3)
ECHO LV INTERNAL DIMENSION SYSTOLIC: 3.41 CM
ECHO LV IVSD: 1.52 CM (ref 0.6–0.9)
ECHO LV MASS 2D: 331.4 G (ref 67–162)
ECHO LV MASS INDEX 2D: 171.8 G/M2 (ref 43–95)
ECHO LV POSTERIOR WALL DIASTOLIC: 1.48 CM (ref 0.6–0.9)
ECHO LVOT DIAM: 2.28 CM
ECHO LVOT PEAK GRADIENT: 1.8 MMHG
ECHO LVOT PEAK VELOCITY: 67.64 CM/S
ECHO MV A VELOCITY: 142.98 CM/S
ECHO MV AREA PHT: 4.2 CM2
ECHO MV E DECELERATION TIME (DT): 278.5 MS
ECHO MV E VELOCITY: 124.2 CM/S
ECHO MV E/A RATIO: 0.87
ECHO MV E/E' LATERAL: 16.58
ECHO MV E/E' RATIO (AVERAGED): 27.4
ECHO MV E/E' SEPTAL: 38.22
ECHO MV MAX VELOCITY: 167.58 CM/S
ECHO MV MEAN GRADIENT: 5.3 MMHG
ECHO MV MEAN INFLOW VELOCITY: 1.07 M/S
ECHO MV PEAK GRADIENT: 11.2 MMHG
ECHO MV PRESSURE HALF TIME (PHT): 52.2 MS
ECHO MV REGURGITANT PEAK GRADIENT: 2.1 MMHG
ECHO MV REGURGITANT PEAK VELOCITY: 73.2 CM/S
ECHO MV VTI: 30.86 CM
ECHO PULMONARY ARTERY SYSTOLIC PRESSURE (PASP): 15.9 MMHG
ECHO RA AREA 4C: 10.4 CM2
ECHO RIGHT VENTRICULAR SYSTOLIC PRESSURE (RVSP): 15.9 MMHG
ECHO TV REGURGITANT MAX VELOCITY: 121.62 CM/S
ECHO TV REGURGITANT PEAK GRADIENT: 5.9 MMHG
ERYTHROCYTE [DISTWIDTH] IN BLOOD BY AUTOMATED COUNT: 15.4 % (ref 11.5–14.5)
GLUCOSE BLD STRIP.AUTO-MCNC: 101 MG/DL (ref 65–100)
GLUCOSE BLD STRIP.AUTO-MCNC: 110 MG/DL (ref 65–100)
GLUCOSE BLD STRIP.AUTO-MCNC: 83 MG/DL (ref 65–100)
GLUCOSE BLD STRIP.AUTO-MCNC: 92 MG/DL (ref 65–100)
GLUCOSE SERPL-MCNC: 114 MG/DL (ref 65–100)
HCT VFR BLD AUTO: 33.7 % (ref 35–47)
HGB BLD-MCNC: 10.9 G/DL (ref 11.5–16)
LVFS 2D: 24.31 %
LVSV (TEICH): 22.1 ML
MAGNESIUM SERPL-MCNC: 2.2 MG/DL (ref 1.6–2.4)
MCH RBC QN AUTO: 31.2 PG (ref 26–34)
MCHC RBC AUTO-ENTMCNC: 32.3 G/DL (ref 30–36.5)
MCV RBC AUTO: 96.6 FL (ref 80–99)
MV DEC SLOPE: 4.46
NRBC # BLD: 0 K/UL (ref 0–0.01)
NRBC BLD-RTO: 0 PER 100 WBC
PHOSPHATE SERPL-MCNC: 4.2 MG/DL (ref 2.6–4.7)
PISA AR MAX VEL: 395.11 CM/S
PLATELET # BLD AUTO: 246 K/UL (ref 150–400)
PMV BLD AUTO: 9.5 FL (ref 8.9–12.9)
POTASSIUM SERPL-SCNC: 3.1 MMOL/L (ref 3.5–5.1)
RBC # BLD AUTO: 3.49 M/UL (ref 3.8–5.2)
SERVICE CMNT-IMP: ABNORMAL
SERVICE CMNT-IMP: ABNORMAL
SERVICE CMNT-IMP: NORMAL
SERVICE CMNT-IMP: NORMAL
SODIUM SERPL-SCNC: 138 MMOL/L (ref 136–145)
THERAPEUTIC RANGE,PTTT: ABNORMAL SECS (ref 58–77)
TROPONIN I SERPL-MCNC: 0.58 NG/ML
WBC # BLD AUTO: 4.9 K/UL (ref 3.6–11)

## 2019-12-25 PROCEDURE — 74011250636 HC RX REV CODE- 250/636: Performed by: HOSPITALIST

## 2019-12-25 PROCEDURE — 83735 ASSAY OF MAGNESIUM: CPT

## 2019-12-25 PROCEDURE — 85730 THROMBOPLASTIN TIME PARTIAL: CPT

## 2019-12-25 PROCEDURE — 36415 COLL VENOUS BLD VENIPUNCTURE: CPT

## 2019-12-25 PROCEDURE — 65660000000 HC RM CCU STEPDOWN

## 2019-12-25 PROCEDURE — 74011250637 HC RX REV CODE- 250/637: Performed by: INTERNAL MEDICINE

## 2019-12-25 PROCEDURE — 74011250637 HC RX REV CODE- 250/637: Performed by: HOSPITALIST

## 2019-12-25 PROCEDURE — 80048 BASIC METABOLIC PNL TOTAL CA: CPT

## 2019-12-25 PROCEDURE — 82962 GLUCOSE BLOOD TEST: CPT

## 2019-12-25 PROCEDURE — 85027 COMPLETE CBC AUTOMATED: CPT

## 2019-12-25 PROCEDURE — 84100 ASSAY OF PHOSPHORUS: CPT

## 2019-12-25 PROCEDURE — 74011250636 HC RX REV CODE- 250/636: Performed by: EMERGENCY MEDICINE

## 2019-12-25 PROCEDURE — 84484 ASSAY OF TROPONIN QUANT: CPT

## 2019-12-25 RX ORDER — POTASSIUM CHLORIDE 750 MG/1
20 TABLET, FILM COATED, EXTENDED RELEASE ORAL 2 TIMES DAILY
Status: DISCONTINUED | OUTPATIENT
Start: 2019-12-25 | End: 2019-12-27 | Stop reason: HOSPADM

## 2019-12-25 RX ORDER — POTASSIUM CHLORIDE 750 MG/1
20 TABLET, FILM COATED, EXTENDED RELEASE ORAL DAILY
Status: DISCONTINUED | OUTPATIENT
Start: 2019-12-25 | End: 2019-12-25

## 2019-12-25 RX ADMIN — POTASSIUM CHLORIDE 20 MEQ: 750 TABLET, FILM COATED, EXTENDED RELEASE ORAL at 19:50

## 2019-12-25 RX ADMIN — POTASSIUM CHLORIDE 20 MEQ: 750 TABLET, FILM COATED, EXTENDED RELEASE ORAL at 10:08

## 2019-12-25 RX ADMIN — Medication 10 ML: at 06:03

## 2019-12-25 RX ADMIN — DOCUSATE SODIUM 100 MG: 100 CAPSULE, LIQUID FILLED ORAL at 10:08

## 2019-12-25 RX ADMIN — CARVEDILOL 12.5 MG: 12.5 TABLET, FILM COATED ORAL at 16:35

## 2019-12-25 RX ADMIN — THERA TABS 1 TABLET: TAB at 10:08

## 2019-12-25 RX ADMIN — TRAMADOL HYDROCHLORIDE 100 MG: 50 TABLET, FILM COATED ORAL at 14:25

## 2019-12-25 RX ADMIN — Medication 10 ML: at 21:41

## 2019-12-25 RX ADMIN — CLOPIDOGREL BISULFATE 75 MG: 75 TABLET ORAL at 10:07

## 2019-12-25 RX ADMIN — HEPARIN SODIUM 2000 UNITS: 5000 INJECTION INTRAVENOUS; SUBCUTANEOUS at 21:40

## 2019-12-25 RX ADMIN — ALLOPURINOL 100 MG: 100 TABLET ORAL at 10:08

## 2019-12-25 RX ADMIN — CARVEDILOL 12.5 MG: 12.5 TABLET, FILM COATED ORAL at 10:09

## 2019-12-25 RX ADMIN — ASPIRIN 81 MG: 81 TABLET, COATED ORAL at 10:07

## 2019-12-25 RX ADMIN — DOCUSATE SODIUM 100 MG: 100 CAPSULE, LIQUID FILLED ORAL at 16:35

## 2019-12-25 RX ADMIN — DOCUSATE SODIUM 100 MG: 100 CAPSULE, LIQUID FILLED ORAL at 19:50

## 2019-12-25 RX ADMIN — ATORVASTATIN CALCIUM 80 MG: 40 TABLET, FILM COATED ORAL at 21:41

## 2019-12-25 RX ADMIN — Medication 5 ML: at 14:00

## 2019-12-25 RX ADMIN — AMIODARONE HYDROCHLORIDE 0.5 MG/MIN: 50 INJECTION, SOLUTION INTRAVENOUS at 14:27

## 2019-12-25 RX ADMIN — HEPARIN SODIUM 9 UNITS/KG/HR: 10000 INJECTION, SOLUTION INTRAVENOUS at 16:29

## 2019-12-25 NOTE — CONSULTS
Pt seen and examined. Full consult dictated. Cont IV amio and heparin gtt. Will need cath 12/26    Thanks.

## 2019-12-25 NOTE — PROGRESS NOTES
Hospitalist Progress Note    NAME: Tennille Noland   :  1945   MRN:  [id number]       Assessment / Plan:  NSTEMI  AICD firing x 3  -trop peaked at 1.43  -planned for cardiac angiography 12.26  -appreciate cardiology consult  -cont amio and heparin gtt   -npo AM  -TTE pending    Ischemic cardiomyopathy with reduced EF  Hx CHF  CAD  -s/p prior CABG and MV repair  -last EF 40-45%, previously 15-20%  -cont dapt with plavix and asa, statin, bb, not currently on ACE/ARB    Hypokalemia  -replete with oral K, recheck am bmp  -Mg ok after repletion    CKD 3  -needs cardiac cath and anticipate risk for LANDON  -will hold on additional IVF in setting of vol she is already getting with amio and hep infusions  -monitor bmp    Gout  -cont allopurinol    Obesity     Body mass index is 42.18 kg/m².     Code: discussed, wants full code  DVT prophylaxis: heparin drip  Surrogate decision maker:  Granddaughter [id number]     Subjective:     Chief Complaint / Reason for Physician Visit  No chest pain, or N/V    Discussed with RN events overnight. Review of Systems:  Symptom Y/N Comments  Symptom Y/N Comments   Fever/Chills n   Chest Pain n    Poor Appetite    Edema     Cough n   Abdominal Pain n    Sputum    Joint Pain     SOB/STEPHENSON n   Pruritis/Rash     Nausea/vomit n   Tolerating PT/OT     Diarrhea n   Tolerating Diet     Constipation n   Other       Could NOT obtain due to:      Objective:     VITALS:   Last 24hrs VS reviewed since prior progress note.  Most recent are:  Patient Vitals for the past 24 hrs:   Temp Pulse Resp BP SpO2   19 0801  74   100 %   19 0759 97.5 °F (36.4 °C) 74 18 130/51 100 %   19 0306 97.7 °F (36.5 °C) 78 18 133/74 96 %   19 2257 98.2 °F (36.8 °C) 78 16 115/57 99 %   19    93/56    19 1925 98.3 °F (36.8 °C) 79 18 (!) 88/50 99 %   19 1656    102/56    19 1546  (!) 110  102/56    19 1538 98.2 °F (36.8 °C) (!) 113 20 94/57 96 % 12/24/19 1300  (!) 101 20 131/81 97 %   12/24/19 1230  (!) 103 21 132/76 97 %   12/24/19 1215  (!) 101 22 149/81 95 %   12/24/19 1200  (!) 101 15 145/74 98 %   12/24/19 1130  (!) 101 14 150/77 100 %   12/24/19 1101  (!) 108 30 135/73 99 %   12/24/19 1015  (!) 103 18 139/82 100 %   12/24/19 0930 98 °F (36.7 °C) (!) 105 16 136/78 99 %       Intake/Output Summary (Last 24 hours) at 12/25/2019 0926  Last data filed at 12/25/2019 0306  Gross per 24 hour   Intake 739.57 ml   Output 300 ml   Net 439.57 ml        PHYSICAL EXAM:  General: WD, WN. Alert, cooperative, no acute distress    EENT:  EOMI. Anicteric sclerae. MMM  Resp:  CTA bilaterally, no wheezing or rales. No accessory muscle use  CV:  Regular  rhythm,  No edema  GI:  Soft, Non distended, Non tender.  +Bowel sounds  Neurologic:  Alert and oriented X 3, normal speech,   Psych:   Good insight. Not anxious nor agitated  Skin:  No rashes. No jaundice    Reviewed most current lab test results and cultures  YES  Reviewed most current radiology test results   YES  Review and summation of old records today    NO  Reviewed patient's current orders and MAR    YES  PMH/ reviewed - no change compared to H&P  ________________________________________________________________________  Care Plan discussed with:    Comments   Patient x    Family      RN x    Care Manager     Consultant                        Multidiciplinary team rounds were held today with , nursing, pharmacist and clinical coordinator. Patient's plan of care was discussed; medications were reviewed and discharge planning was addressed.      ________________________________________________________________________  Total NON critical care TIME: 35   Minutes    Total CRITICAL CARE TIME Spent:   Minutes non procedure based      Comments   >50% of visit spent in counseling and coordination of care x    ________________________________________________________________________  Mahi Shell DO Mar[place]     Procedures: see electronic medical records for all procedures/Xrays and details which were not copied into this note but were reviewed prior to creation of Plan. LABS:  I reviewed today's most current labs and imaging studies.   Pertinent labs include:  Recent Labs     12/25/19  0614 12/24/19  2223 12/24/19  0949   WBC 4.9 5.4 4.9   HGB 10.9* 10.4* 12.0   HCT 33.7* 32.0* 36.7    261 263     Recent Labs     12/25/19  0614 12/24/19  0949    139   K 3.1* 3.2*   CL 98 98   CO2 32 32   * 113*   BUN 14 11   CREA 1.28* 1.23*   CA 8.3* 8.5   MG 2.2 1.5*   PHOS 4.2  --    ALB  --  3.2*   TBILI  --  0.6   SGOT  --  34   ALT  --  22       Signed: Kirill Navarrete DO

## 2019-12-25 NOTE — PROGRESS NOTES
Bedside shift change report given to Nelida Fang (oncoming nurse) by Calista Serrano (offgoing nurse). Report included the following information SBAR, Kardex, Intake/Output, MAR, Recent Results and Cardiac Rhythm NSR/ST. 2130  Amio gtt decreased to 0.5 mg/min. 2223  PTT drawn, along with troponin and CBC. 2330  PTT result of 49.8. Pharmacist, Merle Alcaraz consulted. Per Pharmacy, increased heparin rate to 12 units/kg/hr and 2000 unit bolus of heparin given. Will redraw PTT in 6 hours. 9809  PTT and morning labs drawn. 0700  PTT result 99.9. Pharmacist Gopi consulted. Per Pharmacy, placed heparin gtt on hold for 1 hour. Passed on to day shift nurse to restart heparin gtt at 9 units/kg/hr and redraw PTT at 1400. Bedside shift change report given to Calista Serrano (oncoming nurse) by Nelida Fang (offgoing nurse). Report included the following information SBAR, Kardex, Intake/Output, MAR, Recent Results and Cardiac Rhythm NSR and heparin gtt and amio gtt.

## 2019-12-25 NOTE — PROGRESS NOTES
Cardiology Progress Note      12/25/2019 5:06 PM    Admit Date: 12/24/2019          Subjective: Feels ok. No chest pain. No SOB episodes. No further shocks. Visit Vitals  BP 91/79   Pulse 73   Temp 97.5 °F (36.4 °C)   Resp 18   Ht 5' (1.524 m)   Wt 98 kg (216 lb)   SpO2 100%   BMI 42.18 kg/m²     12/23 1901 - 12/25 0700  In: 739.6 [P.O.:200; I.V.:539.6]  Out: 300 [Urine:300]        Objective:      Physical Exam:  VS as above  Chest CTA  Card RRR no gallop  Extrem s edema     Data Review:   Labs:    PTT 59  Trop 0.58  Mag 2.2  Hgb 10.9  K 3.1  BUN 14  Creat 1.3    Telemetry: SR R 77 No VT       Assessment:     1. Small non-ST segment elevation myocardial infarction. 2.  Ischemic cardiomyopathy with prior coronary bypass grafting and mitral valve repair. 3.  Status post multiple implantable cardioverter-defibrillator shocks prior defibrillator placed several years ago with no previous shocks. 4.  Hypertension. 5.  History of congestive heart failure with prior ejection fraction as low as 15-20%, most recently 40-45%. 6.  History of rheumatoid arthritis. 7.  Obesity. 8.  Mild hypokalemia. Plan: Increase K + replacement. For cath tomm. Will review echo. Procedure discussed.

## 2019-12-25 NOTE — CONSULTS
[facility]    Name:  Viviana Andrade  MR#:  [id number]  :  1945  ACCOUNT #:  [de-identified]  DATE OF SERVICE:  2019      REQUESTING PHYSICIAN:  Dorian Lopez MD.    REASON FOR CONSULTATION:  Evaluate ICD shocks, abnormal troponin. CHIEF COMPLAINT:  I got shocked. HISTORY OF PRESENT ILLNESS:  The patient is a 78-year-old female with a history of known coronary artery disease and ischemic cardiomyopathy. She underwent coronary artery bypass grafting x2 vessels and mitral valve repair at [facility] in . She also underwent defibrillator placement around that same time. She has been followed in the past by Dr. Neli Love and Dr. Aryan Huggins. She has had an EF as low as 15-20% in the past, but most recently she was seen by Dr. Storm Peña in February and EF was around 40-45%. She has been doing reasonably well. She did have a PTCA of the left main in . No other recent interventions. No prior ICD shocks. The patient was not feeling well yesterday and was somewhat fatigued and short of breath. She has been somewhat stressed out because of a move. She did have some vague chest tightness, but no weight gain or swelling. This morning she was walking up some stairs and had a shock from her defibrillator, followed by two subsequent shocks. She was brought to the [facility] ER. Interrogation of the device revealed that the patient did have ventricular fibrillation. Subsequent troponin was 1.07 and the patient has been admitted. She has been placed on IV amiodarone and IV heparin. No further complaints or problems. PAST MEDICAL HISTORY:  As noted above, history of hypertension. No history of diabetes. Apparent history of rheumatoid arthritis. SURGERIES:  Prior bypass surgery and mitral valve repair, prior ICD placement, and prior hysterectomy.     MEDICATIONS PRIOR TO ADMISSION:  Included, Flonase, multivitamin, aspirin, carvedilol, allopurinol, Lipitor, furosemide, Plavix, and tramadol. SOCIAL HISTORY:  The patient is a former smoker and quit several years ago. She does drink alcohol intermittently and has a history of some heavy alcohol use, although not for several weeks. FAMILY HISTORY:  Positive for heart disease. REVIEW OF SYSTEMS:  As noted above, otherwise noncontributory. PHYSICAL EXAMINATION:  GENERAL:  Exam reveals an obese elderly -American female, in no acute distress. VITAL SIGNS:  Blood pressure 131/81, pulse 101, respirations 20. HEENT:  HEENT:  Pupils are equal.  Oropharynx reveals moist oral mucosa. NECK:  Supple. No masses or thyromegaly. No cervical or supraclavicular adenopathy. No carotid bruits. No JVD. CHEST:  Clear. No wheezes or crackles. CARDIAC:  Regular rate and rhythm. 5-4/2 systolic murmur. No diastolic murmur or gallop heard. ABDOMEN:  Obese, soft, nontender, no masses or organomegaly. Bowel sounds positive. EXTREMITIES:  No cyanosis or clubbing. 1+ edema. Distal pulse is not well palpated. SKIN:  Warm and dry. NEUROLOGIC:  No obvious gross motor deficits. LABORATORY DATA:  Troponin 1.07 and 1.43. , hemoglobin 12, potassium 3.2, BUN 11, creatinine 1.2. RADIOLOGY DATA:  Chest x-ray, negative. EKG on admission done today showed sinus tachycardia, left axis deviation, LVH by voltage with nonspecific ST-T changes. IMPRESSION:  1. Small non-ST segment elevation myocardial infarction. 2.  Ischemic cardiomyopathy with prior coronary bypass grafting and mitral valve repair. 3.  Status post multiple implantable cardioverter-defibrillator shocks prior defibrillator placed several years ago with no previous shocks. 4.  Hypertension. 5.  History of congestive heart failure with prior ejection fraction as low as 15-20%, most recently 40-45%. 6.  History of rheumatoid arthritis. 7.  Obesity. 8.  Mild hypokalemia. RECOMMENDATIONS:  Agree with heparin and IV amiodarone for now. Continue aspirin and Plavix as well as carvedilol. The patient did also received magnesium. I would repeat cardiac enzymes and I think the patient will need coronary angiography if I suspect the recent shocks may be driven by ischemia. I have no other good explanation for why she suddenly had three discharges from her defibrillator. We will plan on catheterization on Thursday. Thank you for this consult.       Carola Carvajal MD      [id number]  D:  12/24/2019 19:49  T:  12/24/2019 21:31  JOB #:  [id number]  CC:  Gerhardt Cruz, MD Larraine Schwartz, MD

## 2019-12-26 LAB
ANION GAP SERPL CALC-SCNC: 7 MMOL/L (ref 5–15)
APTT PPP: 55.5 SEC (ref 22.1–32)
BUN SERPL-MCNC: 16 MG/DL (ref 6–20)
BUN/CREAT SERPL: 12 (ref 12–20)
CALCIUM SERPL-MCNC: 8.7 MG/DL (ref 8.5–10.1)
CHLORIDE SERPL-SCNC: 100 MMOL/L (ref 97–108)
CO2 SERPL-SCNC: 27 MMOL/L (ref 21–32)
CREAT SERPL-MCNC: 1.34 MG/DL (ref 0.55–1.02)
GLUCOSE SERPL-MCNC: 105 MG/DL (ref 65–100)
POTASSIUM SERPL-SCNC: 3.8 MMOL/L (ref 3.5–5.1)
SODIUM SERPL-SCNC: 134 MMOL/L (ref 136–145)
THERAPEUTIC RANGE,PTTT: ABNORMAL SECS (ref 58–77)

## 2019-12-26 PROCEDURE — 74011250637 HC RX REV CODE- 250/637: Performed by: HOSPITALIST

## 2019-12-26 PROCEDURE — 85730 THROMBOPLASTIN TIME PARTIAL: CPT

## 2019-12-26 PROCEDURE — 77030016704 HC CATH ANGI DX PRF1 MRTM -B: Performed by: INTERNAL MEDICINE

## 2019-12-26 PROCEDURE — 65660000000 HC RM CCU STEPDOWN

## 2019-12-26 PROCEDURE — 74011250636 HC RX REV CODE- 250/636: Performed by: INTERNAL MEDICINE

## 2019-12-26 PROCEDURE — 74011636320 HC RX REV CODE- 636/320: Performed by: INTERNAL MEDICINE

## 2019-12-26 PROCEDURE — C1769 GUIDE WIRE: HCPCS | Performed by: INTERNAL MEDICINE

## 2019-12-26 PROCEDURE — 77030029065 HC DRSG HEMO QCLOT ZMED -B: Performed by: INTERNAL MEDICINE

## 2019-12-26 PROCEDURE — 36415 COLL VENOUS BLD VENIPUNCTURE: CPT

## 2019-12-26 PROCEDURE — 74011250636 HC RX REV CODE- 250/636: Performed by: HOSPITALIST

## 2019-12-26 PROCEDURE — B2151ZZ FLUOROSCOPY OF LEFT HEART USING LOW OSMOLAR CONTRAST: ICD-10-PCS | Performed by: INTERNAL MEDICINE

## 2019-12-26 PROCEDURE — B2111ZZ FLUOROSCOPY OF MULTIPLE CORONARY ARTERIES USING LOW OSMOLAR CONTRAST: ICD-10-PCS | Performed by: INTERNAL MEDICINE

## 2019-12-26 PROCEDURE — 99152 MOD SED SAME PHYS/QHP 5/>YRS: CPT | Performed by: INTERNAL MEDICINE

## 2019-12-26 PROCEDURE — 74011250637 HC RX REV CODE- 250/637: Performed by: INTERNAL MEDICINE

## 2019-12-26 PROCEDURE — 80048 BASIC METABOLIC PNL TOTAL CA: CPT

## 2019-12-26 PROCEDURE — B2131ZZ FLUOROSCOPY OF MULTIPLE CORONARY ARTERY BYPASS GRAFTS USING LOW OSMOLAR CONTRAST: ICD-10-PCS | Performed by: INTERNAL MEDICINE

## 2019-12-26 PROCEDURE — 77030038269 HC DRN EXT URIN PURWCK BARD -A

## 2019-12-26 PROCEDURE — 99153 MOD SED SAME PHYS/QHP EA: CPT | Performed by: INTERNAL MEDICINE

## 2019-12-26 PROCEDURE — C1894 INTRO/SHEATH, NON-LASER: HCPCS | Performed by: INTERNAL MEDICINE

## 2019-12-26 PROCEDURE — 74011000250 HC RX REV CODE- 250: Performed by: INTERNAL MEDICINE

## 2019-12-26 PROCEDURE — 93458 L HRT ARTERY/VENTRICLE ANGIO: CPT | Performed by: INTERNAL MEDICINE

## 2019-12-26 PROCEDURE — 4A023N7 MEASUREMENT OF CARDIAC SAMPLING AND PRESSURE, LEFT HEART, PERCUTANEOUS APPROACH: ICD-10-PCS | Performed by: INTERNAL MEDICINE

## 2019-12-26 RX ORDER — ACETAMINOPHEN 325 MG/1
650 TABLET ORAL
Status: DISCONTINUED | OUTPATIENT
Start: 2019-12-26 | End: 2019-12-27 | Stop reason: HOSPADM

## 2019-12-26 RX ORDER — HEPARIN SODIUM 200 [USP'U]/100ML
INJECTION, SOLUTION INTRAVENOUS
Status: COMPLETED | OUTPATIENT
Start: 2019-12-26 | End: 2019-12-26

## 2019-12-26 RX ORDER — SODIUM CHLORIDE 0.9 % (FLUSH) 0.9 %
5-40 SYRINGE (ML) INJECTION EVERY 8 HOURS
Status: DISCONTINUED | OUTPATIENT
Start: 2019-12-26 | End: 2019-12-27 | Stop reason: HOSPADM

## 2019-12-26 RX ORDER — LISINOPRIL 5 MG/1
5 TABLET ORAL DAILY
Status: DISCONTINUED | OUTPATIENT
Start: 2019-12-27 | End: 2019-12-27 | Stop reason: HOSPADM

## 2019-12-26 RX ORDER — LIDOCAINE HYDROCHLORIDE 10 MG/ML
INJECTION INFILTRATION; PERINEURAL AS NEEDED
Status: DISCONTINUED | OUTPATIENT
Start: 2019-12-26 | End: 2019-12-26 | Stop reason: HOSPADM

## 2019-12-26 RX ORDER — MIDAZOLAM HYDROCHLORIDE 1 MG/ML
INJECTION, SOLUTION INTRAMUSCULAR; INTRAVENOUS AS NEEDED
Status: DISCONTINUED | OUTPATIENT
Start: 2019-12-26 | End: 2019-12-26 | Stop reason: HOSPADM

## 2019-12-26 RX ORDER — FENTANYL CITRATE 50 UG/ML
INJECTION, SOLUTION INTRAMUSCULAR; INTRAVENOUS AS NEEDED
Status: DISCONTINUED | OUTPATIENT
Start: 2019-12-26 | End: 2019-12-26 | Stop reason: HOSPADM

## 2019-12-26 RX ORDER — SODIUM CHLORIDE 0.9 % (FLUSH) 0.9 %
5-40 SYRINGE (ML) INJECTION AS NEEDED
Status: DISCONTINUED | OUTPATIENT
Start: 2019-12-26 | End: 2019-12-27 | Stop reason: HOSPADM

## 2019-12-26 RX ORDER — SODIUM CHLORIDE 9 MG/ML
75 INJECTION, SOLUTION INTRAVENOUS CONTINUOUS
Status: DISPENSED | OUTPATIENT
Start: 2019-12-26 | End: 2019-12-26

## 2019-12-26 RX ORDER — AMIODARONE HYDROCHLORIDE 200 MG/1
200 TABLET ORAL 2 TIMES DAILY
Status: DISCONTINUED | OUTPATIENT
Start: 2019-12-26 | End: 2019-12-26

## 2019-12-26 RX ADMIN — DOCUSATE SODIUM 100 MG: 100 CAPSULE, LIQUID FILLED ORAL at 18:36

## 2019-12-26 RX ADMIN — ASPIRIN 81 MG: 81 TABLET, COATED ORAL at 05:57

## 2019-12-26 RX ADMIN — TRAMADOL HYDROCHLORIDE 100 MG: 50 TABLET, FILM COATED ORAL at 00:41

## 2019-12-26 RX ADMIN — ALLOPURINOL 100 MG: 100 TABLET ORAL at 08:56

## 2019-12-26 RX ADMIN — Medication 10 ML: at 22:00

## 2019-12-26 RX ADMIN — THERA TABS 1 TABLET: TAB at 08:56

## 2019-12-26 RX ADMIN — CARVEDILOL 12.5 MG: 12.5 TABLET, FILM COATED ORAL at 05:58

## 2019-12-26 RX ADMIN — Medication 10 ML: at 05:26

## 2019-12-26 RX ADMIN — DOCUSATE SODIUM 100 MG: 100 CAPSULE, LIQUID FILLED ORAL at 08:56

## 2019-12-26 RX ADMIN — SODIUM CHLORIDE 75 ML/HR: 900 INJECTION, SOLUTION INTRAVENOUS at 17:11

## 2019-12-26 RX ADMIN — TRAMADOL HYDROCHLORIDE 100 MG: 50 TABLET, FILM COATED ORAL at 18:40

## 2019-12-26 RX ADMIN — POTASSIUM CHLORIDE 20 MEQ: 750 TABLET, FILM COATED, EXTENDED RELEASE ORAL at 08:56

## 2019-12-26 RX ADMIN — CARVEDILOL 12.5 MG: 12.5 TABLET, FILM COATED ORAL at 18:35

## 2019-12-26 RX ADMIN — CLOPIDOGREL BISULFATE 75 MG: 75 TABLET ORAL at 05:58

## 2019-12-26 RX ADMIN — FUROSEMIDE 40 MG: 40 TABLET ORAL at 08:56

## 2019-12-26 RX ADMIN — POTASSIUM CHLORIDE 20 MEQ: 750 TABLET, FILM COATED, EXTENDED RELEASE ORAL at 18:36

## 2019-12-26 RX ADMIN — AMIODARONE HYDROCHLORIDE 0.5 MG/MIN: 50 INJECTION, SOLUTION INTRAVENOUS at 04:29

## 2019-12-26 RX ADMIN — ATORVASTATIN CALCIUM 80 MG: 40 TABLET, FILM COATED ORAL at 21:59

## 2019-12-26 NOTE — ROUTINE PROCESS
1131: Pt just arrived to [facility], site CDI, no complaints. 1545: Pt's bedrest has ended. Pt has ambulated with RN, tolerated well, site CDI, no complaints.

## 2019-12-26 NOTE — PROGRESS NOTES
LHC , cor and graft angio, LV gram completed s complications   [place]     Findings     1- severe 3 vessel CAD.  Previously stented LM widely patent  2- patent LIMA to LAD s signif dz  3- patent SVG to RCA and SVG to OM s signif dz  4- EF 35-40% with inferobasal dyskinesis  5- mildly elevated LVEDP    Rec     Medical rx

## 2019-12-26 NOTE — PROGRESS NOTES
Cardiology Progress Note      12/26/2019 3:01 PM    Admit Date: 12/24/2019          Subjective: Stable post cath. No new c/o          Visit Vitals  /57   Pulse 74   Temp 96.5 °F (35.8 °C)   Resp 14   Ht 5' (1.524 m)   Wt 103 kg (227 lb)   SpO2 93%   BMI 44.33 kg/m²     12/24 1901 - 12/26 0700  In: 2222.5 [P.O.:1202; I.V.:1020.5]  Out: 1250 [Urine:1250]        Objective:      Physical Exam:  VS as above  Chest CTA  Card RRR no gallop     Data Review:   Labs:    creat 1.3    Telemetry: SR no VT     Assessment:     1.  Small non-ST segment elevation myocardial infarction. 2.  Ischemic cardiomyopathy with prior coronary bypass grafting and mitral valve repair. Patent grafts and LM stent, posterobasal dyskinesis EF 35-40%  3.  Status post multiple implantable cardioverter-defibrillator shocks prior defibrillator placed several years ago with no previous shocks. VT and Vfib on interrogation   4.  Hypertension. 5.  History of congestive heart failure with prior ejection fraction as low as 15-20%  6.  History of rheumatoid arthritis. 7.  Obesity. 8.  Mild hypokalemia. Plan:  D/C IV amiodarone. Can probably go home in am if stable. Needs f/u in next several weeks with Dr Daniella Baldwin in [address] [facility] to address VT. May need ablation if she has demonstrated monomorphic VT from prio infarct. Needs lytes corrected. Resume lisinopril- was on this before. No PO antiarrthymics for now- this could slow VT rate below detection zone with ICD .

## 2019-12-26 NOTE — PROGRESS NOTES
Bedside shift change report given to Cat StringerRN (oncoming nurse) by Alison Braga RN (offgoing nurse). Report included the following information SBAR, Kardex, ED Summary, Intake/Output, MAR, Accordion, Recent Results, Med Rec Status, Cardiac Rhythm NSR, Alarm Parameters , Pre Procedure Checklist, Procedure Verification, Quality Measures and Dual Neuro Assessment.

## 2019-12-26 NOTE — PROGRESS NOTES
TRANSFER - OUT REPORT:    Verbal report given to [facility] (name) on Olu Renee  being transferred to [facility]  for routine progression of care       Report consisted of patients Situation, Background, Assessment and   Recommendations(SBAR). Information from the following report(s) SBAR, Kardex, Intake/Output, MAR, Recent Results and Cardiac Rhythm NSR was reviewed with the receiving nurse. Lines:   Peripheral IV 12/24/19 Left Wrist (Active)   Site Assessment Clean, dry, & intact 12/25/2019 11:19 PM   Phlebitis Assessment 0 12/25/2019 11:19 PM   Infiltration Assessment 0 12/25/2019 11:19 PM   Dressing Status Clean, dry, & intact 12/25/2019 11:19 PM   Dressing Type Tape;Transparent 12/25/2019 11:19 PM   Hub Color/Line Status Pink;Flushed;Patent 12/25/2019 11:19 PM   Action Taken Open ports on tubing capped 12/25/2019 11:19 PM   Alcohol Cap Used Yes 12/25/2019 11:19 PM       Peripheral IV 12/25/19 Anterior;Proximal;Right Forearm (Active)   Site Assessment Clean, dry, & intact 12/25/2019 11:19 PM   Phlebitis Assessment 0 12/25/2019 11:19 PM   Infiltration Assessment 0 12/25/2019 11:19 PM   Dressing Status Clean, dry, & intact 12/25/2019 11:19 PM   Dressing Type Tape;Transparent 12/25/2019 11:19 PM   Hub Color/Line Status Blue;Flushed;Patent 12/25/2019 11:19 PM   Action Taken Open ports on tubing capped 12/25/2019 11:19 PM   Alcohol Cap Used Yes 12/25/2019 11:19 PM        Opportunity for questions and clarification was provided.       Patient transported with:   Monitor  Registered Nurse

## 2019-12-26 NOTE — PROGRESS NOTES
PTT: 55.5, confirm heparin rate change to 12 units/kg/hr (incrased from 11 unit/kg/hr) with pharmacist.

## 2019-12-26 NOTE — PROGRESS NOTES
Hospitalist Progress Note    NAME: Vinh Julio   :  1945   MRN:  [id number]       Assessment / Plan:  NSTEMI  AICD firing x 3  -trop peaked at 1.43  -appreciate cardiology consult  -cont amio and heparin gtt   -TTE 12.24 shows low norm sytolic Fx (EF 41-77), age-appropriate LV diastolic Fx    Cardiac cath today:  1- severe 3 vessel CAD. Previously stented LM widely patent  2- patent LIMA to LAD s signif dz  3- patent SVG to RCA and SVG to OM s signif dz  4- EF 35-40% with inferobasal dyskinesis  5- mildly elevated LVEDP    Ischemic cardiomyopathy with reduced EF  Hx CHF  CAD  -s/p prior CABG and MV repair  -last EF 40-45%, previously 15-20%  -cont dapt with plavix and asa, statin, bb, not currently on ACE/ARB    Hypokalemia  -resolved with increased K replacement  -Mg ok after repletion    CKD 3  -will hold on additional IVF in setting of vol she is already getting with amio and hep infusions  -at risk for LANDON after necessary cath procedure  -follow am bmp    Gout  -cont allopurinol    Obesity     Body mass index is 42.18 kg/m².     Code: discussed, wants full code  DVT prophylaxis: heparin drip  Surrogate decision maker:  Granddaughter [id number]     Subjective:     Chief Complaint / Reason for Physician Visit  Denies CP, SOB, N/V. Cath access site looks c/d/i. No other acute complaints    Discussed with RN events overnight. Review of Systems:  Symptom Y/N Comments  Symptom Y/N Comments   Fever/Chills n   Chest Pain n    Poor Appetite    Edema     Cough n   Abdominal Pain n    Sputum    Joint Pain     SOB/STEPHENSON n   Pruritis/Rash     Nausea/vomit n   Tolerating PT/OT     Diarrhea n   Tolerating Diet y    Constipation n   Other       Could NOT obtain due to:      Objective:     VITALS:   Last 24hrs VS reviewed since prior progress note.  Most recent are:  Patient Vitals for the past 24 hrs:   Temp Pulse Resp BP SpO2   19 1230  74 14 153/57 93 %   19 1217    155/71 94 %   19 1200  67 14 134/59    12/26/19 1152  67 13 116/58 93 %   12/26/19 1132 96.5 °F (35.8 °C) 68 18 116/57 100 %   12/26/19 0700 97.5 °F (36.4 °C) 69 15 107/53 100 %   12/26/19 0659  69 15     12/26/19 0558  70  101/57    12/26/19 0440 97.5 °F (36.4 °C) 67 16 100/55 100 %   12/26/19 0252 98.1 °F (36.7 °C) 72 17 97/55 100 %   12/25/19 2319 98.4 °F (36.9 °C) 72 18 99/48 99 %   12/25/19 1944 97.5 °F (36.4 °C) 78 18 111/58 100 %       Intake/Output Summary (Last 24 hours) at 12/26/2019 1306  Last data filed at 12/25/2019 1951  Gross per 24 hour   Intake 980.91 ml   Output 300 ml   Net 680.91 ml        PHYSICAL EXAM:  General: WD, WN. Alert, cooperative, no acute distress    EENT:  EOMI. Anicteric sclerae. MMM  Resp:  CTA bilaterally, no wheezing or rales. No accessory muscle use  CV:  Regular  rhythm,  No edema  GI:  Soft, Non distended, Non tender.  +Bowel sounds  Neurologic:  Alert and oriented X 3, normal speech,   Psych:   Good insight. Not anxious nor agitated  Skin:  No rashes. No jaundice, cath site R wrist c/d/i    Reviewed most current lab test results and cultures  YES  Reviewed most current radiology test results   YES  Review and summation of old records today    NO  Reviewed patient's current orders and MAR    YES  PMH/SH reviewed - no change compared to H&P  ________________________________________________________________________  Care Plan discussed with:    Comments   Patient x    Family      RN x    Care Manager     Consultant                        Multidiciplinary team rounds were held today with , nursing, pharmacist and clinical coordinator. Patient's plan of care was discussed; medications were reviewed and discharge planning was addressed.      ________________________________________________________________________  Total NON critical care TIME: 25   Minutes    Total CRITICAL CARE TIME Spent:   Minutes non procedure based      Comments   >50% of visit spent in counseling and coordination of care x    ________________________________________________________________________  Nory Mcgowan, DO     Procedures: see electronic medical records for all procedures/Xrays and details which were not copied into this note but were reviewed prior to creation of Plan. LABS:  I reviewed today's most current labs and imaging studies.   Pertinent labs include:  Recent Labs     12/25/19  0614 12/24/19  2223 12/24/19  0949   WBC 4.9 5.4 4.9   HGB 10.9* 10.4* 12.0   HCT 33.7* 32.0* 36.7    261 263     Recent Labs     12/26/19  0358 12/25/19  0614 12/24/19  0949   * 138 139   K 3.8 3.1* 3.2*    98 98   CO2 27 32 32   * 114* 113*   BUN 16 14 11   CREA 1.34* 1.28* 1.23*   CA 8.7 8.3* 8.5   MG  --  2.2 1.5*   PHOS  --  4.2  --    ALB  --   --  3.2*   TBILI  --   --  0.6   SGOT  --   --  34   ALT  --   --  22       Signed: Constance Damon DO

## 2019-12-26 NOTE — PROGRESS NOTES
Problem: Falls - Risk of  Goal: *Absence of Falls  Description  Document Rebbecca Persons Fall Risk and appropriate interventions in the flowsheet. Outcome: Progressing Towards Goal  Note: Fall Risk Interventions:            Medication Interventions: Assess postural VS orthostatic hypotension, Evaluate medications/consider consulting pharmacy, Patient to call before getting OOB, Teach patient to arise slowly    Elimination Interventions: Call light in reach, Patient to call for help with toileting needs, Toileting schedule/hourly rounds              Problem: Patient Education: Go to Patient Education Activity  Goal: Patient/Family Education  Outcome: Progressing Towards Goal     Problem: Pain  Goal: *Control of Pain  Outcome: Progressing Towards Goal  Goal: *PALLIATIVE CARE:  Alleviation of Pain  Outcome: Progressing Towards Goal     Problem: Patient Education: Go to Patient Education Activity  Goal: Patient/Family Education  Outcome: Progressing Towards Goal     Problem: Pressure Injury - Risk of  Goal: *Prevention of pressure injury  Description  Document Roverto Scale and appropriate interventions in the flowsheet. Outcome: Progressing Towards Goal  Note: Pressure Injury Interventions: Activity Interventions: Assess need for specialty bed, Increase time out of bed, Pressure redistribution bed/mattress(bed type), PT/OT evaluation    Mobility Interventions: Assess need for specialty bed, Pressure redistribution bed/mattress (bed type), Turn and reposition approx.  every two hours(pillow and wedges)    Nutrition Interventions: Document food/fluid/supplement intake                     Problem: Patient Education: Go to Patient Education Activity  Goal: Patient/Family Education  Outcome: Progressing Towards Goal     Problem: Patient Education: Go to Patient Education Activity  Goal: Patient/Family Education  Outcome: Progressing Towards Goal     Problem: Heart Failure: Day 3  Goal: Off Pathway (Use only if patient is Off Pathway)  Outcome: Progressing Towards Goal  Goal: Activity/Safety  Outcome: Progressing Towards Goal  Goal: Diagnostic Test/Procedures  Outcome: Progressing Towards Goal  Goal: Nutrition/Diet  Outcome: Progressing Towards Goal  Goal: Discharge Planning  Outcome: Progressing Towards Goal  Goal: Medications  Outcome: Progressing Towards Goal  Goal: Respiratory  Outcome: Progressing Towards Goal  Goal: Treatments/Interventions/Procedures  Outcome: Progressing Towards Goal  Goal: Psychosocial  Outcome: Progressing Towards Goal  Goal: *Oxygen saturation within defined limits  Outcome: Progressing Towards Goal  Goal: *Hemodynamically stable  Outcome: Progressing Towards Goal  Goal: *Optimal pain control at patient's stated goal  Outcome: Progressing Towards Goal  Goal: *Anxiety reduced or absent  Outcome: Progressing Towards Goal  Goal: *Demonstrates progressive activity  Outcome: Progressing Towards Goal

## 2019-12-26 NOTE — PROGRESS NOTES
Bedside shift change report given to Silvia Vásquez (oncoming nurse) by Vianey Gonzalez (offgoing nurse). Report included the following information SBAR, Kardex, Intake/Output, MAR, Recent Results and Cardiac Rhythm NSR.    2036  Prosper PTT and sent to lab. 2130  PTT result 41.3. Consulted with Pharmacist, Kevan Salinas. Per pharmacy, increased heparin gtt to 11 units/kg/hr and gave 2000 unit bolus. 2245  Patient assisted with CHG bath. Bed linen and gown changed. 0040  Patient c/o 6/10 R hip pain. Patient given prn tramadol. 8297  Patient PTT and am labs drawn. Powell Leventhal nurse notified of lab draw.

## 2019-12-26 NOTE — PROGRESS NOTES
TRANSFER - IN REPORT:    Verbal report received from Yfn Smith RN(name) on Debbie Zarate  being received from PCU (unit) for routine progression of care      Report consisted of patients Situation, Background, Assessment and   Recommendations(SBAR). Information from the following report(s) SBAR, Kardex, Procedure Summary, Intake/Output, MAR, Accordion, Recent Results, Med Rec Status, Cardiac Rhythm NSR, Alarm Parameters , Pre Procedure Checklist, Procedure Verification, Quality Measures and Dual Neuro Assessment was reviewed with the receiving nurse. Opportunity for questions and clarification was provided. Assessment completed upon patients arrival to unit and care assumed.

## 2019-12-27 ENCOUNTER — HOME HEALTH ADMISSION (OUTPATIENT)
Dept: HOME HEALTH SERVICES | Facility: HOME HEALTH | Age: 74
End: 2019-12-27

## 2019-12-27 VITALS
BODY MASS INDEX: 44.57 KG/M2 | RESPIRATION RATE: 15 BRPM | HEART RATE: 85 BPM | TEMPERATURE: 98.6 F | DIASTOLIC BLOOD PRESSURE: 55 MMHG | WEIGHT: 227 LBS | HEIGHT: 60 IN | SYSTOLIC BLOOD PRESSURE: 102 MMHG | OXYGEN SATURATION: 97 %

## 2019-12-27 LAB
ANION GAP SERPL CALC-SCNC: 7 MMOL/L (ref 5–15)
BUN SERPL-MCNC: 20 MG/DL (ref 6–20)
BUN/CREAT SERPL: 14 (ref 12–20)
CALCIUM SERPL-MCNC: 8 MG/DL (ref 8.5–10.1)
CHLORIDE SERPL-SCNC: 103 MMOL/L (ref 97–108)
CO2 SERPL-SCNC: 26 MMOL/L (ref 21–32)
CREAT SERPL-MCNC: 1.41 MG/DL (ref 0.55–1.02)
GLUCOSE SERPL-MCNC: 89 MG/DL (ref 65–100)
POTASSIUM SERPL-SCNC: 4.1 MMOL/L (ref 3.5–5.1)
SODIUM SERPL-SCNC: 136 MMOL/L (ref 136–145)

## 2019-12-27 PROCEDURE — 74011250637 HC RX REV CODE- 250/637: Performed by: INTERNAL MEDICINE

## 2019-12-27 PROCEDURE — 80048 BASIC METABOLIC PNL TOTAL CA: CPT

## 2019-12-27 PROCEDURE — 36415 COLL VENOUS BLD VENIPUNCTURE: CPT

## 2019-12-27 PROCEDURE — 74011250637 HC RX REV CODE- 250/637: Performed by: HOSPITALIST

## 2019-12-27 RX ORDER — LISINOPRIL 5 MG/1
5 TABLET ORAL DAILY
Qty: 30 TAB | Refills: 3 | Status: SHIPPED | OUTPATIENT
Start: 2019-12-28 | End: 2020-01-28

## 2019-12-27 RX ORDER — POTASSIUM CHLORIDE 1500 MG/1
20 TABLET, FILM COATED, EXTENDED RELEASE ORAL 2 TIMES DAILY
Qty: 60 TAB | Refills: 5 | Status: SHIPPED | OUTPATIENT
Start: 2019-12-27 | End: 2019-12-27

## 2019-12-27 RX ORDER — POTASSIUM CHLORIDE 1500 MG/1
20 TABLET, FILM COATED, EXTENDED RELEASE ORAL 2 TIMES DAILY
Qty: 60 TAB | Refills: 5 | Status: SHIPPED | OUTPATIENT
Start: 2019-12-27 | End: 2020-01-28

## 2019-12-27 RX ORDER — LISINOPRIL 5 MG/1
5 TABLET ORAL DAILY
Qty: 30 TAB | Refills: 3 | Status: SHIPPED | OUTPATIENT
Start: 2019-12-28 | End: 2019-12-27

## 2019-12-27 RX ADMIN — THERA TABS 1 TABLET: TAB at 08:45

## 2019-12-27 RX ADMIN — FUROSEMIDE 40 MG: 40 TABLET ORAL at 08:45

## 2019-12-27 RX ADMIN — DOCUSATE SODIUM 100 MG: 100 CAPSULE, LIQUID FILLED ORAL at 08:45

## 2019-12-27 RX ADMIN — Medication 10 ML: at 06:39

## 2019-12-27 RX ADMIN — ALLOPURINOL 100 MG: 100 TABLET ORAL at 08:45

## 2019-12-27 RX ADMIN — CLOPIDOGREL BISULFATE 75 MG: 75 TABLET ORAL at 08:45

## 2019-12-27 RX ADMIN — TRAMADOL HYDROCHLORIDE 100 MG: 50 TABLET, FILM COATED ORAL at 11:27

## 2019-12-27 RX ADMIN — CARVEDILOL 12.5 MG: 12.5 TABLET, FILM COATED ORAL at 08:45

## 2019-12-27 RX ADMIN — POTASSIUM CHLORIDE 20 MEQ: 750 TABLET, FILM COATED, EXTENDED RELEASE ORAL at 08:44

## 2019-12-27 RX ADMIN — LISINOPRIL 5 MG: 5 TABLET ORAL at 08:45

## 2019-12-27 RX ADMIN — ASPIRIN 81 MG: 81 TABLET, COATED ORAL at 08:44

## 2019-12-27 NOTE — ROUTINE PROCESS
Attempted to schedule PCP MELANIE apt with Dr. Shirin Batres, the nurse will contact the pt with apt date and time due to office computers being down at the time.

## 2019-12-27 NOTE — PROGRESS NOTES
Problem: Falls - Risk of  Goal: *Absence of Falls  Description  Document Miranda Douglass Fall Risk and appropriate interventions in the flowsheet. Outcome: Progressing Towards Goal  Note: Fall Risk Interventions:  Mobility Interventions: Assess mobility with egress test, Patient to call before getting OOB         Medication Interventions: Teach patient to arise slowly, Patient to call before getting OOB    Elimination Interventions: Call light in reach, Toileting schedule/hourly rounds              Problem: Pressure Injury - Risk of  Goal: *Prevention of pressure injury  Description  Document Roverto Scale and appropriate interventions in the flowsheet. Outcome: Progressing Towards Goal  Note: Pressure Injury Interventions:             Activity Interventions: Increase time out of bed    Mobility Interventions: HOB 30 degrees or less, Assess need for specialty bed    Nutrition Interventions: Document food/fluid/supplement intake                     Problem: Cath Lab Procedures: Post-Cath Day of Procedure (Initiate SCIP Measures for Post-Op Care)  Goal: Off Pathway (Use only if patient is Off Pathway)  Outcome: Resolved/Met  Goal: Activity/Safety  Outcome: Progressing Towards Goal  Goal: Consults, if ordered  Outcome: Resolved/Met  Goal: Diagnostic Test/Procedures  Outcome: Resolved/Met  Goal: Nutrition/Diet  Outcome: Progressing Towards Goal  Goal: Discharge Planning  Outcome: Resolved/Met  Goal: Medications  Outcome: Resolved/Met  Goal: Respiratory  Outcome: Progressing Towards Goal  Goal: Treatments/Interventions/Procedures  Outcome: Resolved/Met  Goal: Psychosocial  Outcome: Progressing Towards Goal  Goal: *Procedure site is without bleeding and signs of infection six hours post sheath removal  Outcome: Resolved/Met  Goal: *Hemodynamically stable  Outcome: Resolved/Met  Goal: *Optimal pain control at patient's stated goal  Outcome: Resolved/Met

## 2019-12-27 NOTE — PROGRESS NOTES
Spiritual Care Partner Volunteer visited patient in [place] on December 27, 2019.   Documented by:     DAE Grover, [facility], Staff [address] [place] [address] [employer] Paging Service  [phone]

## 2019-12-27 NOTE — PROGRESS NOTES
Charge nurse, Baljit Wyatt RN informed CM of planned discharge for today. Chart review. MELANIE  Home. Qualifying dx for [facility] - NSTEMI and patient agrees. 2nd IM signed. Copy to patient and chart. Referrral submitted for [facility]. Patient's family will provide transportation at time of discharge. Alert and oriented and on room air. Appts noted on AVS.    CM tasks complete. Nursing informed. 1400  CM acknowledged discharge order. Tasks complete.      Flash Atkinson RN CM  Ext [phone]

## 2019-12-27 NOTE — PROGRESS NOTES
1900: Received report from day shift nurse Oanh Victor. Reviewed SBAR, Kardex, I/O, MAR, Procedural information and Recent results. VSS, NSR (rhythm), RA (oxygenation). A/O X 4  Pt resting in bed/sitting up in the bed. Family at bedside. Cath lab site: Right groin CDI, no bleeding/hematoma. Pt reporting no CP/SOB. 2200: Evening meds given. Pt ambualted from the bed to the bedside commode x 1 assist. Pt voided. Pt returned to bed.     2300: VSS, NSR, RA, no complaints. Pt reports no CP/SOB.     0300: VSS, NSR, RA, no complaints. Pt reports no CP/SOB. Labs drawn. 0700: Bedside and Verbal shift change report given to day shift nurse Oanh Victor (oncoming nurse) by Fay Sampson RN (offgoing nurse). Report included the following information: SBAR, Kardex, Intake/Output, MAR, Procedural information, and Recent Results.

## 2019-12-27 NOTE — DISCHARGE SUMMARY
Hospitalist Discharge Summary     Patient ID:  Thelma Mittal  [id number]  63 y.o.  1945  12/24/2019    PCP on record: Jarrett Ramirez MD    Admit date: 12/24/2019  Discharge date and time: 12/27/2019    DISCHARGE DIAGNOSIS:    See below    CONSULTATIONS:  IP CONSULT TO CARDIOLOGY  IP CONSULT TO HOSPITALIST    Excerpted HPI from H&P of Rj Lewis MD:  Thelma Mittal is a 69 y.o. female with CAD s/p CABG 2015, hx nstemi 2017 with stenting, chronic systolic chf with cardiomyopathy EF 20% 2017, s/p pacemaker and AICD presents with complaint noted above.      At 8am today was walking up a flight of stairs when she developed severe tightness in chest.  Was SOB last night and this morning. She then felt AICD fired 3 times. Currently chest tightness 1-2/10. She has not taken any meds today at home PTA. Her activity level been limited by arthritis, especially in right hip. Uses a cane.     In ER, trop 1.07. AICD interrogated and showing episodes of Vfib and Vtach.    ______________________________________________________________________  DISCHARGE SUMMARY/HOSPITAL COURSE:  for full details see H&P, daily progress notes, labs, consult notes. NSTEMI  AICD firing x 3  -trop peaked at 1.43  -appreciate cardiology consult  -DC antiarrhythmics as the could affect ICD ability to detect VT  -TTE 12.24 shows low norm sytolic Fx (EF 28-48), age-appropriate LV diastolic Fx    Cardiac cath 12.26:  1- severe 3 vessel CAD. Previously stented LM widely patent  2- patent LIMA to LAD s signif dz  3- patent SVG to RCA and SVG to OM s signif dz  4- EF 35-40% with inferobasal dyskinesis  5- mildly elevated LVEDP    To follow up with [facility] with Dr Caridad Parish to address VT.      Ischemic cardiomyopathy with reduced EF  Hx CHF  CAD  -s/p prior CABG and MV repair  -last EF 40-45%, previously 15-20%  -cont dapt with plavix and asa, statin, bb, not currently.  Resume ACEi     Hypokalemia  -resolved with increased K replacement  -cont 20meq bid  -Mg ok after repletion     CKD 3  -Cr up slightly from 1.34 to 1.41, good urine output  -follow up with PCP     Gout  -cont allopurinol     Obesity     Body mass index is 42.18 kg/m².    _______________________________________________________________________  Patient seen and examined by me on discharge day. Pertinent Findings:  Gen:    Not in distress  Chest: Clear lungs  CVS:   Regular rhythm. No edema  Abd:  Soft, not distended, not tender  Neuro:  Alert, oriented x3  _______________________________________________________________________  DISCHARGE MEDICATIONS:   Current Discharge Medication List      CONTINUE these medications which have NOT CHANGED    Details   fluticasone propionate (FLONASE ALLERGY RELIEF) 50 mcg/actuation nasal spray 2 Sprays by Both Nostrils route daily. multivitamin (ONE A DAY) tablet Take 1 Tab by mouth daily. aspirin delayed-release 81 mg tablet Take 81 mg by mouth daily. carvedilol (COREG) 25 mg tablet Take 25 mg by mouth two (2) times daily (with meals). allopurinol (ZYLOPRIM) 100 mg tablet Take 100 mg by mouth daily. atorvastatin (LIPITOR) 80 mg tablet Take 80 mg by mouth daily. furosemide (LASIX) 40 mg tablet Take 40 mg by mouth daily. clopidogrel (PLAVIX) 75 mg tab Take 75 mg by mouth. traMADol (ULTRAM) 50 mg tablet Take  mg by mouth two (2) times daily as needed for Pain. Patient Follow Up Instructions: Activity: Activity as tolerated  Diet: Resume previous diet  Wound Care: cath site as per cardiology instructions    Follow-up with PCP one week. Dr Denise Magdaleno in 3 weeks as below  Follow-up tests/labs none pending  Follow-up Information     Follow up With Specialties Details Why Contact Info    Rachel Suero MD Family Practice  The nurse will contact you with appointment date and time.   Office computers currently down [address] [place] [address].[address]  [phone] [facility]  In 3 weeks Cardiology - hospital follow up with Dr. Jessie William. Office nurse will call you to schedule. If you do not hear from  them in 2 business days please call the office. [id number]  This is your home health agency. They will call you to schedule home visit within 24-48 hours discharge. [address]  [facility] [address]        ________________________________________________________________    Risk of deterioration: Moderate    Condition at Discharge:  Stable  __________________________________________________________________    Disposition  Home with family, no needs.  Hospital to home visit arranged by case management    ____________________________________________________________________    Code Status: Full Code  ___________________________________________________________________      Total time in minutes spent coordinating this discharge (includes going over instructions, follow-up, prescriptions, and preparing report for sign off to her PCP) :  35 minutes    Signed:  Jesenia Gibbs DO

## 2019-12-27 NOTE — PROGRESS NOTES
Cardiology Progress Note      12/27/2019 3:01 PM    Admit Date: 12/24/2019          Subjective: Stable post cath. No new c/o          Visit Vitals  /63   Pulse 81   Temp 98.3 °F (36.8 °C)   Resp 15   Ht 5' (1.524 m)   Wt 103 kg (227 lb)   SpO2 98%   BMI 44.33 kg/m²     12/25 1901 - 12/27 0700  In: 521.1 [P.O.:500; I.V.:21.1]  Out: 700 [Urine:700]        Objective:      Physical Exam:  VS as above  Chest CTA  Card RRR no gallop     Data Review:   Labs:    creat 1.3    Telemetry: SR no VT     Assessment:     1.  Small non-ST segment elevation myocardial infarction. 2.  Ischemic cardiomyopathy with prior coronary bypass grafting and mitral valve repair. Patent grafts and LM stent, posterobasal dyskinesis EF 35-40%  3.  Status post multiple implantable cardioverter-defibrillator shocks prior defibrillator placed several years ago with no previous shocks. VT and Vfib on interrogation   4.  Hypertension. 5.  History of congestive heart failure with prior ejection fraction as low as 15-20%  6.  History of rheumatoid arthritis. 7.  Obesity. 8.  Mild hypokalemia. Plan:  D/C IV amiodarone. Can probably go home in am if stable. Needs f/u in next several weeks with Dr Jennyfer Traylor in [address] to address VT. May need ablation if she has demonstrated monomorphic VT from prio infarct. Needs lytes corrected. Resume lisinopril- was on this before. No PO antiarrthymics for now- this could slow VT rate below detection zone with ICD . 12/27  No new cardiac issues. Per Dr Verner Arab, she will f/u with Dr Jennyfer Traylor at [facility] office in 2 -3 weeks. Office will call her to schedule.

## 2019-12-27 NOTE — ACP (ADVANCE CARE PLANNING)
Advance Care Planning Note      NAME: Samia Donis   :  1945   MRN:  [id number]     Date/Time:  2019 10:36 AM    Active Diagnoses:  Hospital Problems  Never Reviewed          Codes Class Noted POA    Hypokalemia ICD-10-CM: E87.6  ICD-9-CM: 276.8  2019 Unknown        Hypomagnesemia ICD-10-CM: E83.42  ICD-9-CM: 275.2  2019 Unknown        NSTEMI (non-ST elevated myocardial infarction) [facility]) ICD-10-CM: I21.4  ICD-9-CM: 410.70  2017 Unknown              These active diagnoses are of sufficient risk that focused discussion on advance care planning is indicated in order to allow the patient to thoughtfully consider personal goals of care, and if situations arise that prevent the ability to personally give input, to ensure appropriate representation of their personal desires for different levels and aggressiveness of care. Discussion:   Code status addressed and wants to be a Full Code. Patient wants central line and vasopressors if needed. Patient would also want a feeding tube, if needed, for nutritional support. Patient  would like to assign Sheryle Sato, [phone], as the surrogate decision maker. Persons present and participating in discussion: Samia Donis, [address] DO      Time Spent:   Total time spent face-to-face in education and discussion:   16 minutes.          [facility] DO [place]   Hospitalist

## 2019-12-27 NOTE — ROUTINE PROCESS
1515: Discharge info reviewed with pt and family, all questions answered, site CDI. Pt discharged safely via wheelchair.

## 2020-01-03 ENCOUNTER — HOME CARE VISIT (OUTPATIENT)
Dept: HOME HEALTH SERVICES | Facility: HOME HEALTH | Age: 75
End: 2020-01-03

## 2020-01-03 NOTE — PROCEDURES
[facility] CATH    Name:  Marina Ferreira  MR#:  [id number]  :  1945  ACCOUNT #:  [de-identified]  DATE OF SERVICE:  2019    PROCEDURES PERFORMED:  Left heart catheterization, coronary graft angiography, left ventriculography. SURGEON:  Netty Severs, MD    ESTIMATED BLOOD LOSS:  Less than 30 mL. SPECIMENS REMOVED:  None. COMPLICATIONS:  None. ANESTHESIA:  concious sedation     INDICATION:  Status post cardiac arrest with ventricular fibrillation, known ischemic cardiomyopathy. TECHNIQUE:  Right femoral artery via Fara. CATHETERS USED:  5-Colombian JL-4, 5-Colombian JR-4, 5-Colombian pigtail, 5-Colombian MAKENNA. MEDICATIONS USED:  Please see the separate cath lab log sheet. FINDINGS:  1. Hemodynamics. Aortic pressure was 121/43 with a mean of 64. Left ventricular pressure was 111/11 with an LVEDP of 18. There was no significant gradient on pullback across the aortic valve. 2.  Left ventriculography done from the CARDOZA view revealed a mildly enlarged left ventricle with posterior basal dyskinesis. Overall EF was 40%-45%. No other wall motion abnormalities were seen. No significant mitral regurgitation was seen. 3.  Coronary graft angiography revealed severe 3-vessel coronary artery disease. The left main was patent and free of significant disease. The left main had previously been stented. The LAD had severe disease with an 80% stenosis. There was a diagonal branch off the mid LAD which appeared diffusely diseased and moderate in size. The LAD after this was not seen because of competitive flow from the left internal mammary graft. The left circumflex was totally occluded proximally. The right coronary artery was occluded distally at the takeoff of the PDA. There was a diffusely diseased posterior lateral branch which did not appear to be grafted. The left internal mammary graft to the LAD was small in size.   The insertion site was free of significant disease and the body of the graft was free of significant disease. The LAD distal to the insertion site was small in size but there were no high-grade focal stenoses. The saphenous vein graft to the circumflex was patent and free of significant disease and it attached to a distal marginal vessel. The insertion site was free of significant stenosis and the vessel distal to the insertion site was moderate in size and had 2 branches. This was free of significant disease. The saphenous vein graft to the PDA was a large ectatic-appearing vessel without any high-grade focal stenosis. The PDA itself was small and diffusely diseased, but there were no high-grade focal stenoses seen. There was a mitral valve ring was seen via fluoroscopy. No other abnormalities were noted. CONCLUSIONS:  1. Severe 3-vessel native coronary artery disease as noted above with patent stent to the left main. 2.  Left internal mammary graft to the LAD patent and free of significant disease. 3.  Saphenous vein graft to the distal circumflex and marginal, patent and free of significant disease. 4.  Saphenous vein graft to the PDA, patent and free of significant disease with diffuse disease in the native vessel. 5.  Mildly depressed LV systolic function with posterior basal dyskinesis. 6.  Mildly elevated LVEDP. RECOMMENDATIONS:  Continue medical therapy.       Trice Oswald MD      [id number]  D:  01/03/2020 10:11  T:  01/03/2020 12:43  JOB #:  [id number]  CC:  Ector Rodriguez MD

## 2020-01-07 ENCOUNTER — HOME CARE VISIT (OUTPATIENT)
Dept: HOME HEALTH SERVICES | Facility: HOME HEALTH | Age: 75
End: 2020-01-07

## 2020-01-24 ENCOUNTER — HOSPITAL ENCOUNTER (OUTPATIENT)
Age: 75
Setting detail: OBSERVATION
Discharge: HOME HEALTH | End: 2020-01-28
Attending: EMERGENCY MEDICINE | Admitting: FAMILY MEDICINE
Payer: MEDICARE

## 2020-01-24 ENCOUNTER — APPOINTMENT (OUTPATIENT)
Dept: GENERAL RADIOLOGY | Age: 75
End: 2020-01-24
Attending: EMERGENCY MEDICINE
Payer: MEDICARE

## 2020-01-24 DIAGNOSIS — K52.9 GASTROENTERITIS: ICD-10-CM

## 2020-01-24 DIAGNOSIS — E86.0 DEHYDRATION: Primary | ICD-10-CM

## 2020-01-24 DIAGNOSIS — N28.9 RENAL INSUFFICIENCY: ICD-10-CM

## 2020-01-24 PROBLEM — I95.9 HYPOTENSION: Status: ACTIVE | Noted: 2020-01-24

## 2020-01-24 PROBLEM — N17.9 AKI (ACUTE KIDNEY INJURY) (HCC): Status: ACTIVE | Noted: 2020-01-24

## 2020-01-24 LAB
ANION GAP SERPL CALC-SCNC: 17 MMOL/L (ref 5–15)
BASOPHILS # BLD: 0 K/UL (ref 0–0.1)
BASOPHILS NFR BLD: 0 % (ref 0–1)
BNP SERPL-MCNC: ABNORMAL PG/ML (ref 0–125)
BUN SERPL-MCNC: 40 MG/DL (ref 6–20)
BUN/CREAT SERPL: 17 (ref 12–20)
CALCIUM SERPL-MCNC: 9.4 MG/DL (ref 8.5–10.1)
CHLORIDE SERPL-SCNC: 96 MMOL/L (ref 97–108)
CO2 SERPL-SCNC: 20 MMOL/L (ref 21–32)
CREAT SERPL-MCNC: 2.37 MG/DL (ref 0.55–1.02)
DIFFERENTIAL METHOD BLD: ABNORMAL
EOSINOPHIL # BLD: 0 K/UL (ref 0–0.4)
EOSINOPHIL NFR BLD: 0 % (ref 0–7)
ERYTHROCYTE [DISTWIDTH] IN BLOOD BY AUTOMATED COUNT: 13.5 % (ref 11.5–14.5)
GLUCOSE SERPL-MCNC: 141 MG/DL (ref 65–100)
HCT VFR BLD AUTO: 41 % (ref 35–47)
HGB BLD-MCNC: 14 G/DL (ref 11.5–16)
IMM GRANULOCYTES # BLD AUTO: 0 K/UL (ref 0–0.04)
IMM GRANULOCYTES NFR BLD AUTO: 1 % (ref 0–0.5)
LYMPHOCYTES # BLD: 1.8 K/UL (ref 0.8–3.5)
LYMPHOCYTES NFR BLD: 21 % (ref 12–49)
MCH RBC QN AUTO: 31.1 PG (ref 26–34)
MCHC RBC AUTO-ENTMCNC: 34.1 G/DL (ref 30–36.5)
MCV RBC AUTO: 91.1 FL (ref 80–99)
MONOCYTES # BLD: 0.7 K/UL (ref 0–1)
MONOCYTES NFR BLD: 8 % (ref 5–13)
NEUTS SEG # BLD: 6.1 K/UL (ref 1.8–8)
NEUTS SEG NFR BLD: 70 % (ref 32–75)
NRBC # BLD: 0 K/UL (ref 0–0.01)
NRBC BLD-RTO: 0 PER 100 WBC
PLATELET # BLD AUTO: 357 K/UL (ref 150–400)
PMV BLD AUTO: 9.9 FL (ref 8.9–12.9)
POTASSIUM SERPL-SCNC: 4 MMOL/L (ref 3.5–5.1)
RBC # BLD AUTO: 4.5 M/UL (ref 3.8–5.2)
SODIUM SERPL-SCNC: 133 MMOL/L (ref 136–145)
TROPONIN I SERPL-MCNC: 0.16 NG/ML
TROPONIN I SERPL-MCNC: 0.23 NG/ML
WBC # BLD AUTO: 8.6 K/UL (ref 3.6–11)

## 2020-01-24 PROCEDURE — 99285 EMERGENCY DEPT VISIT HI MDM: CPT

## 2020-01-24 PROCEDURE — 85025 COMPLETE CBC W/AUTO DIFF WBC: CPT

## 2020-01-24 PROCEDURE — 84484 ASSAY OF TROPONIN QUANT: CPT

## 2020-01-24 PROCEDURE — 74011250636 HC RX REV CODE- 250/636: Performed by: INTERNAL MEDICINE

## 2020-01-24 PROCEDURE — 71045 X-RAY EXAM CHEST 1 VIEW: CPT

## 2020-01-24 PROCEDURE — 80048 BASIC METABOLIC PNL TOTAL CA: CPT

## 2020-01-24 PROCEDURE — 96374 THER/PROPH/DIAG INJ IV PUSH: CPT

## 2020-01-24 PROCEDURE — 96361 HYDRATE IV INFUSION ADD-ON: CPT

## 2020-01-24 PROCEDURE — 93005 ELECTROCARDIOGRAM TRACING: CPT

## 2020-01-24 PROCEDURE — 36415 COLL VENOUS BLD VENIPUNCTURE: CPT

## 2020-01-24 PROCEDURE — 65390000012 HC CONDITION CODE 44 OBSERVATION

## 2020-01-24 PROCEDURE — 74011250636 HC RX REV CODE- 250/636: Performed by: EMERGENCY MEDICINE

## 2020-01-24 PROCEDURE — 65660000000 HC RM CCU STEPDOWN

## 2020-01-24 PROCEDURE — 83880 ASSAY OF NATRIURETIC PEPTIDE: CPT

## 2020-01-24 RX ORDER — ASPIRIN 81 MG/1
81 TABLET ORAL DAILY
Status: DISCONTINUED | OUTPATIENT
Start: 2020-01-25 | End: 2020-01-28 | Stop reason: HOSPADM

## 2020-01-24 RX ORDER — SODIUM CHLORIDE 9 MG/ML
75 INJECTION, SOLUTION INTRAVENOUS CONTINUOUS
Status: DISCONTINUED | OUTPATIENT
Start: 2020-01-24 | End: 2020-01-24

## 2020-01-24 RX ORDER — SODIUM CHLORIDE 0.9 % (FLUSH) 0.9 %
5-40 SYRINGE (ML) INJECTION AS NEEDED
Status: DISCONTINUED | OUTPATIENT
Start: 2020-01-24 | End: 2020-01-28 | Stop reason: HOSPADM

## 2020-01-24 RX ORDER — LANOLIN ALCOHOL/MO/W.PET/CERES
CREAM (GRAM) TOPICAL
COMMUNITY
Start: 2019-11-21

## 2020-01-24 RX ORDER — ALLOPURINOL 100 MG/1
100 TABLET ORAL DAILY
Status: DISCONTINUED | OUTPATIENT
Start: 2020-01-25 | End: 2020-01-25

## 2020-01-24 RX ORDER — ERGOCALCIFEROL 1.25 MG/1
CAPSULE ORAL
COMMUNITY
Start: 2020-01-04

## 2020-01-24 RX ORDER — LEVOTHYROXINE SODIUM 25 UG/1
25 TABLET ORAL
COMMUNITY
Start: 2020-01-04

## 2020-01-24 RX ORDER — SODIUM CHLORIDE 9 MG/ML
75 INJECTION, SOLUTION INTRAVENOUS CONTINUOUS
Status: DISCONTINUED | OUTPATIENT
Start: 2020-01-24 | End: 2020-01-25

## 2020-01-24 RX ORDER — SODIUM CHLORIDE 0.9 % (FLUSH) 0.9 %
5-40 SYRINGE (ML) INJECTION EVERY 8 HOURS
Status: DISCONTINUED | OUTPATIENT
Start: 2020-01-24 | End: 2020-01-26

## 2020-01-24 RX ORDER — ATORVASTATIN CALCIUM 40 MG/1
80 TABLET, FILM COATED ORAL DAILY
Status: DISCONTINUED | OUTPATIENT
Start: 2020-01-25 | End: 2020-01-28 | Stop reason: HOSPADM

## 2020-01-24 RX ORDER — SODIUM CHLORIDE 0.9 % (FLUSH) 0.9 %
5-40 SYRINGE (ML) INJECTION EVERY 8 HOURS
Status: DISCONTINUED | OUTPATIENT
Start: 2020-01-24 | End: 2020-01-28 | Stop reason: HOSPADM

## 2020-01-24 RX ORDER — CLOPIDOGREL BISULFATE 75 MG/1
75 TABLET ORAL DAILY
Status: DISCONTINUED | OUTPATIENT
Start: 2020-01-25 | End: 2020-01-28 | Stop reason: HOSPADM

## 2020-01-24 RX ORDER — DULOXETIN HYDROCHLORIDE 30 MG/1
30 CAPSULE, DELAYED RELEASE ORAL DAILY
COMMUNITY
Start: 2019-12-23

## 2020-01-24 RX ORDER — ERGOCALCIFEROL 1.25 MG/1
50000 CAPSULE ORAL
Status: DISCONTINUED | OUTPATIENT
Start: 2020-01-26 | End: 2020-01-28 | Stop reason: HOSPADM

## 2020-01-24 RX ORDER — THERA TABS 400 MCG
1 TAB ORAL DAILY
Status: DISCONTINUED | OUTPATIENT
Start: 2020-01-25 | End: 2020-01-28 | Stop reason: HOSPADM

## 2020-01-24 RX ORDER — ONDANSETRON 2 MG/ML
4 INJECTION INTRAMUSCULAR; INTRAVENOUS
Status: COMPLETED | OUTPATIENT
Start: 2020-01-24 | End: 2020-01-24

## 2020-01-24 RX ORDER — LEVOTHYROXINE SODIUM 25 UG/1
25 TABLET ORAL DAILY
Status: DISCONTINUED | OUTPATIENT
Start: 2020-01-25 | End: 2020-01-28 | Stop reason: HOSPADM

## 2020-01-24 RX ORDER — NALOXONE HYDROCHLORIDE 1 MG/ML
INJECTION INTRAMUSCULAR; INTRAVENOUS; SUBCUTANEOUS
Status: DISCONTINUED
Start: 2020-01-24 | End: 2020-01-25

## 2020-01-24 RX ORDER — FLUTICASONE PROPIONATE 50 MCG
2 SPRAY, SUSPENSION (ML) NASAL DAILY
Status: DISCONTINUED | OUTPATIENT
Start: 2020-01-25 | End: 2020-01-28 | Stop reason: HOSPADM

## 2020-01-24 RX ADMIN — SODIUM CHLORIDE 250 ML: 900 INJECTION, SOLUTION INTRAVENOUS at 16:32

## 2020-01-24 RX ADMIN — Medication 10 ML: at 23:30

## 2020-01-24 RX ADMIN — Medication 10 ML: at 22:00

## 2020-01-24 RX ADMIN — ONDANSETRON 4 MG: 2 INJECTION INTRAMUSCULAR; INTRAVENOUS at 16:32

## 2020-01-24 RX ADMIN — SODIUM CHLORIDE 125 ML/HR: 900 INJECTION, SOLUTION INTRAVENOUS at 23:30

## 2020-01-24 RX ADMIN — SODIUM CHLORIDE 150 ML/HR: 900 INJECTION, SOLUTION INTRAVENOUS at 18:35

## 2020-01-24 NOTE — ED NOTES
Patient here with c/o vomiting and diarrhea. Patient states that she has not been able to keep down meds. Patient brought from tirage area to the room for increased HR and decreased BP. Symptoms for about 1 week. Patient with hx of AICD, states it fired 3 times a few days after Blank, stating \"I just haven't been the same since\". Patient states her regular cardiologist retired last month. Emergency Department Nursing Plan of Care       The Nursing Plan of Care is developed from the Nursing assessment and Emergency Department Attending provider initial evaluation. The plan of care may be reviewed in the ED Provider note.     The Plan of Care was developed with the following considerations:   Patient / Family readiness to learn indicated by:verbalized understanding  Persons(s) to be included in education: patient  Barriers to Learning/Limitations:No    Signed     Brenda Finn RN    1/24/2020   4:17 PM

## 2020-01-24 NOTE — H&P
Admitted patient for DANIELE, acute gastroenteritis, hypotension due to dehydration, discussed with ER attending, will monitor Troponin.   Visit Vitals  /50   Pulse (!) 103   Temp 98.3 °F (36.8 °C)   Resp 17   Ht 5' (1.524 m)   Wt 95.3 kg (210 lb 1.6 oz)   SpO2 98%   BMI 41.03 kg/m²     Discussed with RN, patient sitting in her bed speaking  in no distress,

## 2020-01-24 NOTE — PROGRESS NOTES
Core Measure patient. CM opened case for assessment of D/C planning needs, CM reviewed chart. Reason for Admission:   Per Provider note. presents via private vehicle to the ED with cc of intermittent N/V/D and mild SOB x 1 week. The pt reports \"I haven't been the same since a few days after [place] when my AICD fired. \" She notes AICD fire due to vfib. Pt states that her last episode of emesis was yesterday and last BM was today. She reports compliance with all of her daily medications. Pt notes that she has a cardiologist but he retired 1 month ago                   RRAT Score:                     Plan for utilizing home health: To be determine. CM following for discharge planning. Likelihood of Readmission:  Low per RRAT score (1 ER visit in 6 month) (0 IP admittion in 6 moth). Transition of Care Plan:    Patient will need a new Cardiology appointment. CM attempt to schedule PCP and Cardiology appointment both office are close. CM will attempt for follow-up on Monday during regular business hours. Plan of care for discharge:  1. PCP follow-up   2.  New Cardiology appointment      [address] [place] [address]  [phone]

## 2020-01-24 NOTE — ED PROVIDER NOTES
EMERGENCY DEPARTMENT HISTORY AND PHYSICAL EXAM      Date: 1/24/2020  Patient Name: Christiano Farrell    History of Presenting Illness     Chief Complaint   Patient presents with    Diarrhea    Vomiting       History Provided By: Patient    HPI: Christiano Farrell, 69 y.o. female with PMHx significant for HTN, NSTEMI s/p stent placement, CKD III, heart failure, ventricular tachycardia, PSHx significant for AICD, CABG, presents via private vehicle to the ED with cc of intermittent N/V/D and mild SOB x 1 week. The pt reports \"I haven't been the same since a few days after [place] when my AICD fired. \" She notes AICD fire due to vfib. Pt states that her last episode of emesis was yesterday and last BM was today. She reports compliance with all of her daily medications. Pt notes that she has a cardiologist but he retired 1 month ago. She denies any alleviating/exacerbating factors for her current sx's. She specifically denies any fever, chills, CP, HA, abdominal pain, or rash. There are no other complaints, changes, or physical findings at this time. Social Hx: + EtOH (occasional); - Smoker (former); - Illicit Drugs     PCP: Maurice Lou MD    Current Facility-Administered Medications   Medication Dose Route Frequency Provider Last Rate Last Dose    sodium chloride (NS) flush 5-40 mL  5-40 mL IntraVENous Q8H Venkatesh Myles MD        0.9% sodium chloride infusion  150 mL/hr IntraVENous CONTINUOUS Venkatesh Myles MD         Current Outpatient Medications   Medication Sig Dispense Refill    lisinopril (PRINIVIL, ZESTRIL) 5 mg tablet Take 1 Tab by mouth daily. 30 Tab 3    potassium chloride SR (K-TAB) 20 mEq tablet Take 1 Tab by mouth two (2) times a day. 60 Tab 5    fluticasone propionate (FLONASE ALLERGY RELIEF) 50 mcg/actuation nasal spray 2 Sprays by Both Nostrils route daily.  multivitamin (ONE A DAY) tablet Take 1 Tab by mouth daily.       aspirin delayed-release 81 mg tablet Take 81 mg by mouth daily.      carvedilol (COREG) 25 mg tablet Take 25 mg by mouth two (2) times daily (with meals).  allopurinol (ZYLOPRIM) 100 mg tablet Take 100 mg by mouth daily.  atorvastatin (LIPITOR) 80 mg tablet Take 80 mg by mouth daily.  furosemide (LASIX) 40 mg tablet Take 40 mg by mouth daily.  clopidogrel (PLAVIX) 75 mg tab Take 75 mg by mouth.  traMADol (ULTRAM) 50 mg tablet Take  mg by mouth two (2) times daily as needed for Pain. Past History     Past Medical History:  Past Medical History:   Diagnosis Date    Arthritis     CAD (coronary artery disease)     CKD (chronic kidney disease) stage 3, GFR 30-59 ml/min ([facility] 12/24/2019    Heart failure ([facility]     Hypertension     NSTEMI (non-ST elevated myocardial infarction) ([facility] 12/24/2019    Ventricular fibrillation ([facility] 12/24/2019    AICD fired due to Vfib    [facility] 12[facility]/2019       Past Surgical History:  Past Surgical History:   Procedure Laterality Date    CARDIAC SURG PROCEDURE UNLIST      pacemaker/defibrilator placed 2 2015    HX CORONARY ARTERY BYPASS GRAFT  2015    2v.    HX GYN      hysterectomy       Family History:  Family History   Problem Relation Age of Onset    Heart Disease Other         daughter with enlarged heart    Kidney Disease Other         daughter    Tuberculosis Father        Social History:  Social History     Tobacco Use    Smoking status: Former Smoker    Smokeless tobacco: Never Used   Substance Use Topics    Alcohol use: Yes     Binge frequency: Less than monthly    Drug use: No       Allergies:  No Known Allergies    Review of Systems   Review of Systems   Constitutional: Negative for fever. HENT: Negative for sore throat. Eyes: Negative for photophobia and redness. Respiratory: Positive for shortness of breath. Negative for wheezing. Cardiovascular: Negative for chest pain and leg swelling.    Gastrointestinal: Positive for diarrhea, nausea and vomiting. Negative for abdominal pain and blood in stool. Genitourinary: Negative for difficulty urinating, dysuria, hematuria, menstrual problem and vaginal bleeding. Musculoskeletal: Negative for back pain and joint swelling. Skin: Negative for rash. Neurological: Negative for dizziness, seizures, syncope, speech difficulty, weakness, numbness and headaches. Hematological: Negative for adenopathy. Psychiatric/Behavioral: Negative for agitation, confusion and suicidal ideas. The patient is not nervous/anxious. Physical Exam   Physical Exam  Vitals signs and nursing note reviewed. Constitutional:       Appearance: Normal appearance. She is well-developed. HENT:      Head: Normocephalic and atraumatic. Eyes:      Conjunctiva/sclera: Conjunctivae normal.   Neck:      Musculoskeletal: Full passive range of motion without pain, normal range of motion and neck supple. Cardiovascular:      Rate and Rhythm: Regular rhythm. Tachycardia present. Pulses: Normal pulses. Heart sounds: Normal heart sounds, S1 normal and S2 normal. No murmur. Pulmonary:      Effort: Pulmonary effort is normal. No respiratory distress. Breath sounds: Normal breath sounds. No wheezing. Abdominal:      General: Bowel sounds are normal. There is no distension. Palpations: Abdomen is soft. Tenderness: There is no tenderness. There is no rebound. Musculoskeletal: Normal range of motion. Skin:     General: Skin is warm and dry. Findings: No rash. Neurological:      Mental Status: She is alert and oriented to person, place, and time. Psychiatric:         Speech: Speech normal.         Behavior: Behavior normal.         Thought Content:  Thought content normal.         Judgment: Judgment normal.       Diagnostic Study Results     Labs -     Recent Results (from the past 12 hour(s))   CBC WITH AUTOMATED DIFF    Collection Time: 01/24/20  4:19 PM   Result Value Ref Range    WBC 8.6 3.6 - 11.0 K/uL    RBC 4.50 3.80 - 5.20 M/uL    HGB 14.0 11.5 - 16.0 g/dL    HCT 41.0 35.0 - 47.0 %    MCV 91.1 80.0 - 99.0 FL    MCH 31.1 26.0 - 34.0 PG    MCHC 34.1 30.0 - 36.5 g/dL    RDW 13.5 11.5 - 14.5 %    PLATELET 246 420 - 051 K/uL    MPV 9.9 8.9 - 12.9 FL    NRBC 0.0 0  WBC    ABSOLUTE NRBC 0.00 0.00 - 0.01 K/uL    NEUTROPHILS 70 32 - 75 %    LYMPHOCYTES 21 12 - 49 %    MONOCYTES 8 5 - 13 %    EOSINOPHILS 0 0 - 7 %    BASOPHILS 0 0 - 1 %    IMMATURE GRANULOCYTES 1 (H) 0.0 - 0.5 %    ABS. NEUTROPHILS 6.1 1.8 - 8.0 K/UL    ABS. LYMPHOCYTES 1.8 0.8 - 3.5 K/UL    ABS. MONOCYTES 0.7 0.0 - 1.0 K/UL    ABS. EOSINOPHILS 0.0 0.0 - 0.4 K/UL    ABS. BASOPHILS 0.0 0.0 - 0.1 K/UL    ABS. IMM. GRANS. 0.0 0.00 - 0.04 K/UL    DF AUTOMATED     METABOLIC PANEL, BASIC    Collection Time: 01/24/20  4:19 PM   Result Value Ref Range    Sodium 133 (L) 136 - 145 mmol/L    Potassium 4.0 3.5 - 5.1 mmol/L    Chloride 96 (L) 97 - 108 mmol/L    CO2 20 (L) 21 - 32 mmol/L    Anion gap 17 (H) 5 - 15 mmol/L    Glucose 141 (H) 65 - 100 mg/dL    BUN 40 (H) 6 - 20 MG/DL    Creatinine 2.37 (H) 0.55 - 1.02 MG/DL    BUN/Creatinine ratio 17 12 - 20      GFR est AA 24 (L) >60 ml/min/1.73m2    GFR est non-AA 20 (L) >60 ml/min/1.73m2    Calcium 9.4 8.5 - 10.1 MG/DL   NT-PRO BNP    Collection Time: 01/24/20  4:19 PM   Result Value Ref Range    NT pro-BNP 17,314 (H) 0 - 125 PG/ML   TROPONIN I    Collection Time: 01/24/20  4:19 PM   Result Value Ref Range    Troponin-I, Qt. 0.23 (H) <0.05 ng/mL       Radiologic Studies -   XR CHEST PORT   Final Result   IMPRESSION: No acute cardiopulmonary process seen. CT Results  (Last 48 hours)    None        CXR Results  (Last 48 hours)               01/24/20 1646  XR CHEST PORT Final result    Impression:  IMPRESSION: No acute cardiopulmonary process seen.        Narrative:  EXAM: XR CHEST PORT       INDICATION: chest pain       COMPARISON: 12/24/2019       FINDINGS: A portable AP radiograph of the chest was obtained at 1228 hours. The   patient is on a cardiac monitor. The lungs are clear. The cardiac and   mediastinal contours and pulmonary vascularity are stable. A pacemaker is in   place. A single cardiac valve is in place. Bilateral glenohumeral osteoarthritis   is asymmetric to the right. Sternotomy wires are intact. Medical Decision Making   I am the first provider for this patient. I reviewed the vital signs, available nursing notes, past medical history, past surgical history, family history and social history. Vital Signs-Reviewed the patient's vital signs. Patient Vitals for the past 12 hrs:   Temp Pulse Resp BP SpO2   01/24/20 1616    94/53 98 %   01/24/20 1607 98.3 °F (36.8 °C) (!) 132 19 (!) 74/54 98 %       Pulse Oximetry Analysis - 98% on RA    Cardiac Monitor:   Rate: 132 bpm  Rhythm: Sinus Tachycardia      EKG interpretation: (Preliminary)1618  Rhythm: sinus tachycardia with PAC's with aberrant conduction; and regular . Rate (approx.): 127; Axis: normal; MD interval: normal; QRS interval: normal ; ST/T wave: inverted peak T waves; Other findings: left ventricular hypertrophy. Written by Dillan Mendieta. Adalberto Hathaway, ED Scribe, as dictated by Jeffery Helton MD    Records Reviewed: Nursing Notes, Old Medical Records, Previous electrocardiograms, Previous Radiology Studies and Previous Laboratory Studies    Provider Notes (Medical Decision Making):   DDx: gastroenteritis, dehydration, electrolyte abnormality, CHF    ED Course:   Initial assessment performed. The patients presenting problems have been discussed, and they are in agreement with the care plan formulated and outlined with them. I have encouraged them to ask questions as they arise throughout their visit. CONSULT NOTE:   5:13 PM  Jeffery Helton MD, spoke with Sushma Cruz MD,   Specialty: Hospitalist  Discussed pt's hx, disposition, and available diagnostic and imaging results. Reviewed care plans.  Consultant will evaluate pt for admission. Written by Caitlin Gupta. Alison Hay, ED Scribe, as dictated by Kendra Sloan MD    Consult Note:  5:40 Tanya Anaya MD, spoke with Sabrina Lantigua MD,  Specialty: Cardiology  Discussed pt's hx, disposition, and available diagnostic and imaging results. Reviewed care plans. Consultant agrees with plans as outlined. Because the pt has her own cardiologist at [address] [place] [address], we should consult them as needed. Critical Care Time:   None    Disposition:  Admit Note:  5:36 PM  Pt is being admitted by Dr. Leo Becerra. The results of their tests and reason(s) for their admission have been discussed with pt and/or available family. They convey agreement and understanding for the need to be admitted and for admission diagnosis. PLAN:  1. Admit to Hospitalist  Diagnosis     Clinical Impression:   1. Dehydration    2. Gastroenteritis    3. Renal insufficiency        This note is prepared by Bre Hay, acting as Scribe for MD Kendra Cohen MD: The scribe's documentation has been prepared under my direction and personally reviewed by me in its entirety. I confirm that the note above accurately reflects all work, treatment, procedures, and medical decision making performed by me. This note will not be viewable in [address]

## 2020-01-24 NOTE — PROGRESS NOTES
I was called by Dr. Chelly Marin about this patient who is a patient of [facility]. He explained that the patient appears to be in with primarily noncardiac issues including dehydration. I advised that if there are simple issues that require phone consultation, that they should call [facility] for advice. Obviously, if there are issues of higher cardiac acuity and that group does not consult at their hospital, the patient could be transferred to [facility] where they do consult.

## 2020-01-25 PROBLEM — E16.2 HYPOGLYCEMIA: Status: ACTIVE | Noted: 2020-01-25

## 2020-01-25 LAB
ALBUMIN SERPL-MCNC: 3 G/DL (ref 3.5–5)
ALBUMIN/GLOB SERPL: 0.8 {RATIO} (ref 1.1–2.2)
ALP SERPL-CCNC: 66 U/L (ref 45–117)
ALT SERPL-CCNC: 13 U/L (ref 12–78)
ANION GAP SERPL CALC-SCNC: 8 MMOL/L (ref 5–15)
AST SERPL-CCNC: 22 U/L (ref 15–37)
BILIRUB SERPL-MCNC: 0.8 MG/DL (ref 0.2–1)
BUN SERPL-MCNC: 41 MG/DL (ref 6–20)
BUN/CREAT SERPL: 20 (ref 12–20)
CALCIUM SERPL-MCNC: 8.4 MG/DL (ref 8.5–10.1)
CHLORIDE SERPL-SCNC: 97 MMOL/L (ref 97–108)
CO2 SERPL-SCNC: 27 MMOL/L (ref 21–32)
CREAT SERPL-MCNC: 2.05 MG/DL (ref 0.55–1.02)
GLOBULIN SER CALC-MCNC: 3.7 G/DL (ref 2–4)
GLUCOSE SERPL-MCNC: 103 MG/DL (ref 65–100)
MAGNESIUM SERPL-MCNC: 1.6 MG/DL (ref 1.6–2.4)
POTASSIUM SERPL-SCNC: 4 MMOL/L (ref 3.5–5.1)
PROT SERPL-MCNC: 6.7 G/DL (ref 6.4–8.2)
SODIUM SERPL-SCNC: 132 MMOL/L (ref 136–145)
TROPONIN I SERPL-MCNC: 0.14 NG/ML

## 2020-01-25 PROCEDURE — 74011250636 HC RX REV CODE- 250/636: Performed by: FAMILY MEDICINE

## 2020-01-25 PROCEDURE — 65390000012 HC CONDITION CODE 44 OBSERVATION

## 2020-01-25 PROCEDURE — 74011250636 HC RX REV CODE- 250/636: Performed by: INTERNAL MEDICINE

## 2020-01-25 PROCEDURE — 74011250637 HC RX REV CODE- 250/637: Performed by: FAMILY MEDICINE

## 2020-01-25 PROCEDURE — 96361 HYDRATE IV INFUSION ADD-ON: CPT

## 2020-01-25 PROCEDURE — 99218 HC RM OBSERVATION: CPT

## 2020-01-25 PROCEDURE — 83735 ASSAY OF MAGNESIUM: CPT

## 2020-01-25 PROCEDURE — 84484 ASSAY OF TROPONIN QUANT: CPT

## 2020-01-25 PROCEDURE — 93005 ELECTROCARDIOGRAM TRACING: CPT

## 2020-01-25 PROCEDURE — 80053 COMPREHEN METABOLIC PANEL: CPT

## 2020-01-25 PROCEDURE — 36415 COLL VENOUS BLD VENIPUNCTURE: CPT

## 2020-01-25 RX ORDER — SODIUM CHLORIDE 0.9 % (FLUSH) 0.9 %
5-40 SYRINGE (ML) INJECTION AS NEEDED
Status: DISCONTINUED | OUTPATIENT
Start: 2020-01-25 | End: 2020-01-26

## 2020-01-25 RX ORDER — SODIUM CHLORIDE 9 MG/ML
100 INJECTION, SOLUTION INTRAVENOUS CONTINUOUS
Status: DISPENSED | OUTPATIENT
Start: 2020-01-25 | End: 2020-01-26

## 2020-01-25 RX ORDER — LANOLIN ALCOHOL/MO/W.PET/CERES
400 CREAM (GRAM) TOPICAL 2 TIMES DAILY
Status: DISCONTINUED | OUTPATIENT
Start: 2020-01-25 | End: 2020-01-28 | Stop reason: HOSPADM

## 2020-01-25 RX ORDER — MAGNESIUM SULFATE 1 G/100ML
1 INJECTION INTRAVENOUS ONCE
Status: COMPLETED | OUTPATIENT
Start: 2020-01-25 | End: 2020-01-25

## 2020-01-25 RX ORDER — ALLOPURINOL 100 MG/1
100 TABLET ORAL
Status: DISCONTINUED | OUTPATIENT
Start: 2020-01-25 | End: 2020-01-28 | Stop reason: HOSPADM

## 2020-01-25 RX ORDER — SODIUM CHLORIDE 0.9 % (FLUSH) 0.9 %
5-40 SYRINGE (ML) INJECTION EVERY 8 HOURS
Status: DISCONTINUED | OUTPATIENT
Start: 2020-01-25 | End: 2020-01-26

## 2020-01-25 RX ORDER — ACETAMINOPHEN 325 MG/1
650 TABLET ORAL
Status: DISCONTINUED | OUTPATIENT
Start: 2020-01-25 | End: 2020-01-28 | Stop reason: HOSPADM

## 2020-01-25 RX ADMIN — ATORVASTATIN CALCIUM 80 MG: 40 TABLET, FILM COATED ORAL at 13:27

## 2020-01-25 RX ADMIN — Medication 10 ML: at 13:28

## 2020-01-25 RX ADMIN — THERA TABS 1 TABLET: TAB at 13:27

## 2020-01-25 RX ADMIN — Medication 10 ML: at 14:14

## 2020-01-25 RX ADMIN — ALLOPURINOL 100 MG: 100 TABLET ORAL at 21:56

## 2020-01-25 RX ADMIN — LEVOTHYROXINE SODIUM 25 MCG: 25 TABLET ORAL at 06:41

## 2020-01-25 RX ADMIN — Medication 10 ML: at 06:15

## 2020-01-25 RX ADMIN — SODIUM CHLORIDE 100 ML/HR: 900 INJECTION, SOLUTION INTRAVENOUS at 23:11

## 2020-01-25 RX ADMIN — Medication 400 MG: at 18:00

## 2020-01-25 RX ADMIN — MAGNESIUM SULFATE HEPTAHYDRATE 1 G: 1 INJECTION, SOLUTION INTRAVENOUS at 14:58

## 2020-01-25 RX ADMIN — CLOPIDOGREL BISULFATE 75 MG: 75 TABLET, FILM COATED ORAL at 13:28

## 2020-01-25 RX ADMIN — ASPIRIN 81 MG: 81 TABLET, COATED ORAL at 13:27

## 2020-01-25 RX ADMIN — SODIUM CHLORIDE 125 ML/HR: 900 INJECTION, SOLUTION INTRAVENOUS at 06:13

## 2020-01-25 RX ADMIN — FLUTICASONE PROPIONATE 2 SPRAY: 50 SPRAY, METERED NASAL at 17:55

## 2020-01-25 RX ADMIN — SODIUM CHLORIDE 500 ML: 900 INJECTION, SOLUTION INTRAVENOUS at 14:13

## 2020-01-25 RX ADMIN — SODIUM CHLORIDE 100 ML/HR: 900 INJECTION, SOLUTION INTRAVENOUS at 15:29

## 2020-01-25 RX ADMIN — Medication 10 ML: at 03:30

## 2020-01-25 NOTE — H&P
Hospitalist Admission Note    NAME: Nikki Villalobos   :     MRN:  [id number]     Date/Time:  2020 1:13 PM    Patient PCP: Sheng De MD  ______________________________________________________________________  Given the patient's current clinical presentation, I have a high level of concern for decompensation if discharged from the emergency department. Complex decision making was performed, which includes reviewing the patient's available past medical records, laboratory results, and x-ray films. My assessment of this patient's clinical condition and my plan of care is as follows. Assessment / Plan:  Patient 69 response female admitted for acute kidney injury, elevated troponin, abnormal EKG, nausea vomiting,   Improving. 1.DANIELE; continue IV fluid, monitor intake output, renal function, avoid nephrotoxin, repeat labs tomorrow a.m.. 2.  Elevated troponin, abnormal EKG, patient denies chest pain, no chest pain on presentation, troponin trending down no change, seen EKG from admission, discussed with cardiologist on-call. Advised consult cardiovascular specialist.  Awaiting call back. Spoke wit Dr. Dr. Olivier Stark advised continue current management closely monitor  I asked him that I will transfer to Hospitalist if feel that cannot be managed her without ICU   3. History of coronary artery disease, status post CABG, no chest pain, troponin trending down. We will continue aspirin Plavix. 4.  Hyperlipidemia; resume Lipitor  5. Gout; no arthritis, continue allopurinol  6. Hypothyroidism ; resume levothyroxine    Code Status: Full code  Surrogate Decision Maker:    DVT Prophylaxis: Heparin  GI Prophylaxis: not indicated    Baseline: Independent      Subjective:   CHIEF COMPLAINT: Nausea vomiting. HISTORY OF PRESENT ILLNESS:     Marilynn Brock is a 69 y.o.    female past medical history significant for arthritis, coronary artery disease, CKD, heart failure, hypertension, non-STEMI, V. fib, ventricular tachycardia who presents with nausea vomiting. Patient reports since [place] has not been feeling well, reports episodes of AICD fired due to ventricular fibrillation. Reports had similar symptoms 2 years ago, came in to [facility].  Did not have any chest pain shortness of breath. Reports of loose stool. Did not measure temperature, states maybe she had some fever. STEMI, Trop peak at 1.43 cardiology consulted. Antiarrhythmic agent stopped. TTE 12/24/2019 showed low systolic function ejection fraction 36-38, diastolic dysfunction. Cardiac catheterization 12/26/2019 shows severe three-vessel coronary artery disease, ejection fraction 35 to 40%. Advise follow-up with Dr. Cali Markham. We were asked to admit for work up and evaluation of the above problems. Past Medical History:   Diagnosis Date    Arthritis     CAD (coronary artery disease)     CKD (chronic kidney disease) stage 3, GFR 30-59 ml/min [facility] 12/24/2019    [facility] failure [facility]     Hypertension     NSTEMI (non-ST elevated myocardial infarction) ([facility] 12/24/2019    Ventricular fibrillation ([facility] 12/24/2019    AICD fired due to Vfib    Ventricular tachycardia ([facility] 12/24/2019        Past Surgical History:   Procedure Laterality Date    CARDIAC SURG PROCEDURE UNLIST      pacemaker/defibrilator placed 2 2015    HX CORONARY ARTERY BYPASS GRAFT  2015    2v.    HX GYN      hysterectomy       Social History     Tobacco Use    Smoking status: Former Smoker    Smokeless tobacco: Never Used   Substance Use Topics    Alcohol use: Yes     Binge frequency: Less than monthly        Family History   Problem Relation Age of Onset    Heart Disease Other         daughter with enlarged heart    Kidney Disease Other         daughter    Tuberculosis Father      No Known Allergies     Prior to Admission medications    Medication Sig Start Date End Date Taking?  Authorizing Provider DULoxetine (CYMBALTA) 30 mg capsule Take 30 mg by mouth daily. 12/23/19  Yes Provider, Historical   cyanocobalamin 1,000 mcg tablet TAKE 1 TABLET BY MOUTH EVERY DAY 11/21/19  Yes Provider, Historical   ergocalciferol (ERGOCALCIFEROL) 1,250 mcg (50,000 unit) capsule every seven (7) days. SUNDAYS 1/4/20  Yes Provider, Historical   levothyroxine (SYNTHROID) 25 mcg tablet Take 25 mcg by mouth Daily (before breakfast). 1/4/20  Yes Provider, Historical   lisinopril (PRINIVIL, ZESTRIL) 5 mg tablet Take 1 Tab by mouth daily. 12/28/19  Yes Luly Damon, DO   potassium chloride SR (K-TAB) 20 mEq tablet Take 1 Tab by mouth two (2) times a day. 12/27/19  Yes Luly Damon, DO   fluticasone propionate (FLONASE ALLERGY RELIEF) 50 mcg/actuation nasal spray 2 Sprays by Both Nostrils route daily. PRN allergy season  Indications: inflammation of the nose due to an allergy   Yes Provider, Historical   multivitamin (ONE A DAY) tablet Take 1 Tab by mouth daily. Yes Provider, Historical   aspirin delayed-release 81 mg tablet Take 81 mg by mouth daily. Yes Provider, Historical   carvedilol (COREG) 25 mg tablet Take 25 mg by mouth two (2) times daily (with meals). Yes Other, MD Angelina   allopurinol (ZYLOPRIM) 100 mg tablet Take 100 mg by mouth nightly. Yes Other, MD Angelina   atorvastatin (LIPITOR) 80 mg tablet Take 80 mg by mouth daily. Yes Other, MD Angelina   furosemide (LASIX) 40 mg tablet Take 40 mg by mouth daily. Yes Other, MD Angelina   clopidogrel (PLAVIX) 75 mg tab Take 75 mg by mouth. Yes Other, MD Angelina   traMADol (ULTRAM) 50 mg tablet Take  mg by mouth two (2) times daily as needed for Pain. Yes Provider, Historical       REVIEW OF SYSTEMS:     I am not able to complete the review of systems because:    The patient is intubated and sedated    The patient has altered mental status due to his acute medical problems    The patient has baseline aphasia from prior stroke(s)    The patient has baseline dementia and is not reliable historian    The patient is in acute medical distress and unable to provide information           Total of 12 systems reviewed as follows:       POSITIVE= underlined text  Negative = text not underlined  General:  fever, chills, sweats, generalized weakness, weight loss/gain,      loss of appetite   Eyes:    blurred vision, eye pain, loss of vision, double vision  ENT:    rhinorrhea, pharyngitis   Respiratory:   cough, sputum production, SOB, STEPHENSON, wheezing, pleuritic pain   Cardiology:   chest pain, palpitations, orthopnea, PND, edema, syncope   Gastrointestinal:  abdominal pain , N/V, diarrhea, dysphagia, constipation, bleeding   Genitourinary:  frequency, urgency, dysuria, hematuria, incontinence   Muskuloskeletal :  arthralgia, myalgia, back pain  Hematology:  easy bruising, nose or gum bleeding, lymphadenopathy   Dermatological: rash, ulceration, pruritis, color change / jaundice  Endocrine:   hot flashes or polydipsia   Neurological:  headache, dizziness, confusion, focal weakness, paresthesia,     Speech difficulties, memory loss, gait difficulty  Psychological: Feelings of anxiety, depression, agitation    Objective:   VITALS:    Visit Vitals  BP 93/49   Pulse 97   Temp 98.4 °F (36.9 °C)   Resp 15   Ht 5' (1.524 m)   Wt 91.6 kg (202 lb)   SpO2 95%   Breastfeeding No   BMI 39.45 kg/m²       PHYSICAL EXAM:    General:    Alert, cooperative, no distress, appears stated age. HEENT: Atraumatic, anicteric sclerae, pink conjunctivae     No oral ulcers, mucosa moist, throat clear, dentition fair  Neck:  Supple, symmetrical,  thyroid: non tender  Lungs:   Clear to auscultation bilaterally. No Wheezing or Rhonchi. No rales. Chest wall:  No tenderness  No Accessory muscle use. Heart:   Regular  rhythm,  No  murmur   No edema  Abdomen:   Soft, non-tender. Not distended. Bowel sounds normal  Extremities: No cyanosis.   No clubbing,      Skin turgor normal, Capillary refill normal, Radial dial pulse 2+  Skin:     Not pale. Not Jaundiced  No rashes   Psych:  Good insight. Not depressed. Not anxious or agitated. Neurologic: EOMs intact. No facial asymmetry. No aphasia or slurred speech. Symmetrical strength, Sensation grossly intact. Alert and oriented X 4.     _______________________________________________________________________  Care Plan discussed with:    Comments   Patient     Family      RN     Care Manager                    Consultant:      _______________________________________________________________________  Expected  Disposition:   Home with Family    HH/PT/OT/RN    SNF/LTC    RAYRAY    ________________________________________________________________________  TOTAL TIME: 45 minutes    Critical Care Provided     Minutes non procedure based      Comments     Reviewed previous records   >50% of visit spent in counseling and coordination of care  Discussion with patient and/or family and questions answered       ________________________________________________________________________  Signed: Sher Cintron MD    Procedures: see electronic medical records for all procedures/Xrays and details which were not copied into this note but were reviewed prior to creation of Plan. LAB DATA REVIEWED:    Recent Results (from the past 24 hour(s))   CBC WITH AUTOMATED DIFF    Collection Time: 01/24/20  4:19 PM   Result Value Ref Range    WBC 8.6 3.6 - 11.0 K/uL    RBC 4.50 3.80 - 5.20 M/uL    HGB 14.0 11.5 - 16.0 g/dL    HCT 41.0 35.0 - 47.0 %    MCV 91.1 80.0 - 99.0 FL    MCH 31.1 26.0 - 34.0 PG    MCHC 34.1 30.0 - 36.5 g/dL    RDW 13.5 11.5 - 14.5 %    PLATELET 487 937 - 782 K/uL    MPV 9.9 8.9 - 12.9 FL    NRBC 0.0 0  WBC    ABSOLUTE NRBC 0.00 0.00 - 0.01 K/uL    NEUTROPHILS 70 32 - 75 %    LYMPHOCYTES 21 12 - 49 %    MONOCYTES 8 5 - 13 %    EOSINOPHILS 0 0 - 7 %    BASOPHILS 0 0 - 1 %    IMMATURE GRANULOCYTES 1 (H) 0.0 - 0.5 %    ABS. NEUTROPHILS 6.1 1.8 - 8.0 K/UL    ABS. LYMPHOCYTES 1.8 0.8 - 3.5 K/UL    ABS. MONOCYTES 0.7 0.0 - 1.0 K/UL    ABS. EOSINOPHILS 0.0 0.0 - 0.4 K/UL    ABS. BASOPHILS 0.0 0.0 - 0.1 K/UL    ABS. IMM. GRANS. 0.0 0.00 - 0.04 K/UL    DF AUTOMATED     METABOLIC PANEL, BASIC    Collection Time: 01/24/20  4:19 PM   Result Value Ref Range    Sodium 133 (L) 136 - 145 mmol/L    Potassium 4.0 3.5 - 5.1 mmol/L    Chloride 96 (L) 97 - 108 mmol/L    CO2 20 (L) 21 - 32 mmol/L    Anion gap 17 (H) 5 - 15 mmol/L    Glucose 141 (H) 65 - 100 mg/dL    BUN 40 (H) 6 - 20 MG/DL    Creatinine 2.37 (H) 0.55 - 1.02 MG/DL    BUN/Creatinine ratio 17 12 - 20      GFR est AA 24 (L) >60 ml/min/1.73m2    GFR est non-AA 20 (L) >60 ml/min/1.73m2    Calcium 9.4 8.5 - 10.1 MG/DL   NT-PRO BNP    Collection Time: 01/24/20  4:19 PM   Result Value Ref Range    NT pro-BNP 17,314 (H) 0 - 125 PG/ML   TROPONIN I    Collection Time: 01/24/20  4:19 PM   Result Value Ref Range    Troponin-I, Qt. 0.23 (H) <0.05 ng/mL   TROPONIN I    Collection Time: 01/24/20 10:19 PM   Result Value Ref Range    Troponin-I, Qt. 0.16 (H) <1.68 ng/mL   METABOLIC PANEL, COMPREHENSIVE    Collection Time: 01/25/20  3:27 AM   Result Value Ref Range    Sodium 132 (L) 136 - 145 mmol/L    Potassium 4.0 3.5 - 5.1 mmol/L    Chloride 97 97 - 108 mmol/L    CO2 27 21 - 32 mmol/L    Anion gap 8 5 - 15 mmol/L    Glucose 103 (H) 65 - 100 mg/dL    BUN 41 (H) 6 - 20 MG/DL    Creatinine 2.05 (H) 0.55 - 1.02 MG/DL    BUN/Creatinine ratio 20 12 - 20      GFR est AA 29 (L) >60 ml/min/1.73m2    GFR est non-AA 24 (L) >60 ml/min/1.73m2    Calcium 8.4 (L) 8.5 - 10.1 MG/DL    Bilirubin, total 0.8 0.2 - 1.0 MG/DL    ALT (SGPT) 13 12 - 78 U/L    AST (SGOT) 22 15 - 37 U/L    Alk.  phosphatase 66 45 - 117 U/L    Protein, total 6.7 6.4 - 8.2 g/dL    Albumin 3.0 (L) 3.5 - 5.0 g/dL    Globulin 3.7 2.0 - 4.0 g/dL    A-G Ratio 0.8 (L) 1.1 - 2.2     TROPONIN I    Collection Time: 01/25/20  3:27 AM   Result Value Ref Range    Troponin-I, Qt. 0.14 (H) <0.05 ng/mL MAGNESIUM    Collection Time: 01/25/20  3:27 AM   Result Value Ref Range    Magnesium 1.6 1.6 - 2.4 mg/dL

## 2020-01-25 NOTE — ED NOTES
TRANSFER - OUT REPORT:    Verbal report given to Darlen Apgar on [facility]  being transferred to [facility] for routine progression of care       Report consisted of patients Situation, Background, Assessment and   Recommendations(SBAR). Information from the following report(s) SBAR, ED Summary, Procedure Summary, MAR and Recent Results was reviewed with the receiving nurse. Lines:       Opportunity for questions and clarification was provided.       Patient transported with:   Monitor  Registered Nurse   Personal Belongings  Guion

## 2020-01-25 NOTE — PROGRESS NOTES
0900 - Micah Wolfe, 69 y/o BF admitted to room PCU-1 from the ED via bed and accomp by ED RN. Patient denies c/o pain or nausea on admit. Cardiac monitor shows SR/ST with prolonged QT and inverted/depressed T wave in lead II. Patient is a/o X4. Admission assessment ongoing. 0915 - BP trended down to mid 80's. Patient HOB lowered, MD notified, monitoring closely. 0950 - BP improved, lowest was mid 77'J systolic. Note that since DBP is low, MAP is also low. Cardiac rhythm remains NSR/ST with occas PVC's. When pt BP drops into 80's she also has occasional couplet. 1400 - Pt's BP dropped significantly into systolic high 67'Y at the lowest. Dr Latanya Ruiz and NP notified together, NS fluid bolus initiated immed. Pt denies c/o pain, pressure or SOB. 1440 - BP more consistently in 11'O systolic. Continuing fluid bolus for total of 1 liter now. 1600 - BP still hypotensive but more stable. Notified Dr Latanya Ruiz and NP. Will continue to monitor closely. Patient's family is at the bedside with relevant but many questions. Answered questions to family satisfaction. Education provided as noted in record for both pt and family. Teachback received of relevant points, however will continue to reinforce. 1830 - Patient has dropped her BP a couple times since 7898 into systolic 09'D, but recovers immediately. Continues to deny c/o pain or SOB. Attempted to start a new IV per pt request since the [facility] site frequently alarms (but is still viable). Unable to achieve a new IV. Lungs reassessed several times since fluid bolus earlier in the shift, Very fine rales in one base only. Otherwise still clear with patient on room air and sats .  1915 - Report given to JIMENEZ Mitchell for oncoming shift.

## 2020-01-25 NOTE — PROGRESS NOTES
Problem: Nausea/Vomiting (Adult)  Goal: *Absence of nausea/vomiting  Outcome: Progressing Towards Goal     Problem: Patient Education: Go to Patient Education Activity  Goal: Patient/Family Education  Outcome: Progressing Towards Goal

## 2020-01-25 NOTE — ED NOTES
Bedside shift change report given to JIMENEZ BOOKER (oncoming nurse) by Mary Meza RN (offgoing nurse). Report included the following information SBAR, ED Summary, Procedure Summary, MAR and Recent Results. Oncoming staff nurse notified patient can be taken to floor after 0730.

## 2020-01-25 NOTE — PROGRESS NOTES
[facility] Medication Reconciliation     Recommendations/Findings:   1) Patient confirmed taking medications as listed below  2) Adjusted Allopurinol 100 mg to QHS (pt preference) from QAM    Information obtained from: Patient interview, Bella Khan    Past Medical History/Disease States:  Past Medical History:   Diagnosis Date    Arthritis     CAD (coronary artery disease)     CKD (chronic kidney disease) stage 3, GFR 30-59 ml/min ([facility]) 2019    Heart failure ([facility] [address]     Hypertension     NSTEMI (non-ST elevated myocardial infarction) ([facility] [address] 2019    Ventricular fibrillation ([facility] [address] 2019    AICD fired due to Vfib    Ventricular tachycardia ([facility] 2019         Patient allergies: Allergies as of 2020    (No Known Allergies)         Prior to Admission Medications   Prescriptions    Taking? DULoxetine (CYMBALTA) 30 mg capsule    Yes   Sig: Take 30 mg by mouth daily. allopurinol (ZYLOPRIM) 100 mg tablet    Yes   Sig: Take 100 mg by mouth nightly. aspirin delayed-release 81 mg tablet    Yes   Sig: Take 81 mg by mouth daily. atorvastatin (LIPITOR) 80 mg tablet    Yes   Sig: Take 80 mg by mouth daily. carvedilol (COREG) 25 mg tablet    Yes   Sig: Take 25 mg by mouth two (2) times daily (with meals). clopidogrel (PLAVIX) 75 mg tab    Yes   Sig: Take 75 mg by mouth.   cyanocobalamin 1,000 mcg tablet    Yes   Sig: TAKE 1 TABLET BY MOUTH EVERY DAY   ergocalciferol (ERGOCALCIFEROL) 1,250 mcg (50,000 unit) capsule    Yes   Sig: every seven (7) days. SUNDAYS   fluticasone propionate (FLONASE ALLERGY RELIEF) 50 mcg/actuation nasal spray    Yes   Si Sprays by Both Nostrils route daily. PRN allergy season  Indications: inflammation of the nose due to an allergy   furosemide (LASIX) 40 mg tablet    Yes   Sig: Take 40 mg by mouth daily. levothyroxine (SYNTHROID) 25 mcg tablet    Yes   Sig: Take 25 mcg by mouth Daily (before breakfast).    lisinopril (PRINIVIL, ZESTRIL) 5 mg tablet Yes   Sig: Take 1 Tab by mouth daily. multivitamin (ONE A DAY) tablet    Yes   Sig: Take 1 Tab by mouth daily. potassium chloride SR (K-TAB) 20 mEq tablet    Yes   Sig: Take 1 Tab by mouth two (2) times a day. traMADol (ULTRAM) 50 mg tablet    Yes   Sig: Take  mg by mouth two (2) times daily as needed for Pain.              Thank you,  Harjit Quick [facility]

## 2020-01-25 NOTE — ED NOTES
Bedside shift change report given to Sarah Patel RN (oncoming nurse) by Courtney Chapin RN (offgoing nurse). Report included the following information SBAR, ED Summary, OR Summary, MAR, Recent Results and Cardiac Rhythm paced, .

## 2020-01-25 NOTE — ED NOTES
Pt. Able to hold down water w/o nausea, emesis. Appetite remains poor as patient has not eaten anything provided.

## 2020-01-25 NOTE — PHYSICIAN ADVISORY
[facility] Physician Advisor Recommendation The information in this document is a recommendation to be used for utilization review and utilization management purposes only. This recommendation is not an order. The recommendation is made based on the information reviewed at the time of the referral, is pursuant to the [facility] Conditions of Participation (42 CFR Part 482), and is neither a judgment nor an assessment with regard to the appropriateness or quality of clinical care. Nothing in this document may be used to limit, alter, or affect clinical services provided to the patient named below. The provider of services is ultimately responsible for the submission of a claim that has met all requirements for correct coding, billing, and reimbursement. Letter of Status Determination:   
Recommend Changing patient status from INPATIENT to OBSERVATION Pt Name:  Nesha Mccann MR#  [id number] [facility]#   [id number] Room and Mj  @ [address] [place] [address]  
Hospitalization date  1/24/2020  4:10 PM  
Current Attending Physician  Alka Li MD  
Principal diagnosis  <principal problem not specified> Clinicals  69 y.o. female hospitalized with DANIELE, acute gastroenteritis, hypotension due to dehydration, discussed with ER attending, will monitor Troponin. STATUS DETERMINATION  On the basis of review of available clinical data, documentation in the chart  It is our recommendation that the patient's status is changed from INPATIENT to OBSERVATION The final decision of the patient's hospitalization status depends on the attending physician's judgment Additional comments Insurance  Payor: Keagan Lama / Plan: [address] [place] [address] HMO / Product Type: Managed Care Medicare /   
 
  
 
Payton Andrade MD, ELI Physician [address] [facility] [address] 
 [place], [address] [place] [address]. [place] Phone:   
 Subscriber: Amelia Hernandez Subscriber#: [id number]  Group#: [id number] Precert#:

## 2020-01-25 NOTE — PROGRESS NOTES
Problem: Nausea/Vomiting (Adult)  Goal: *Absence of nausea/vomiting  1/24/2020 2056 by Elizabeth Sheffield RN  Outcome: Progressing Towards Goal  1/24/2020 2056 by Elizabeth Sheffield RN  Outcome: Progressing Towards Goal     Problem: Patient Education: Go to Patient Education Activity  Goal: Patient/Family Education  1/24/2020 2056 by Elizabeth Sheffield RN  Outcome: Progressing Towards Goal  1/24/2020 2056 by Elizabeth Sheffield RN  Outcome: Progressing Towards Goal     Problem: Diarrhea (Adult and Pediatrics)  Goal: *Absence of diarrhea  Outcome: Progressing Towards Goal     Problem: Patient Education: Go to Patient Education Activity  Goal: Patient/Family Education  Outcome: Progressing Towards Goal

## 2020-01-25 NOTE — ED NOTES
Pt continues to rest quietly in bed in position of comfort. Updated on plan of care. Call bell within reach. Pt. Provided with 120ml water.

## 2020-01-26 LAB
ANION GAP SERPL CALC-SCNC: 9 MMOL/L (ref 5–15)
BUN SERPL-MCNC: 25 MG/DL (ref 6–20)
BUN/CREAT SERPL: 21 (ref 12–20)
CALCIUM SERPL-MCNC: 8.4 MG/DL (ref 8.5–10.1)
CHLORIDE SERPL-SCNC: 103 MMOL/L (ref 97–108)
CO2 SERPL-SCNC: 24 MMOL/L (ref 21–32)
CREAT SERPL-MCNC: 1.19 MG/DL (ref 0.55–1.02)
GLUCOSE SERPL-MCNC: 80 MG/DL (ref 65–100)
MAGNESIUM SERPL-MCNC: 2.1 MG/DL (ref 1.6–2.4)
POTASSIUM SERPL-SCNC: 3.8 MMOL/L (ref 3.5–5.1)
SODIUM SERPL-SCNC: 136 MMOL/L (ref 136–145)

## 2020-01-26 PROCEDURE — 36415 COLL VENOUS BLD VENIPUNCTURE: CPT

## 2020-01-26 PROCEDURE — 74011250636 HC RX REV CODE- 250/636: Performed by: NURSE PRACTITIONER

## 2020-01-26 PROCEDURE — 83735 ASSAY OF MAGNESIUM: CPT

## 2020-01-26 PROCEDURE — 99218 HC RM OBSERVATION: CPT

## 2020-01-26 PROCEDURE — 96361 HYDRATE IV INFUSION ADD-ON: CPT

## 2020-01-26 PROCEDURE — 80048 BASIC METABOLIC PNL TOTAL CA: CPT

## 2020-01-26 PROCEDURE — 74011250637 HC RX REV CODE- 250/637: Performed by: FAMILY MEDICINE

## 2020-01-26 PROCEDURE — 96365 THER/PROPH/DIAG IV INF INIT: CPT

## 2020-01-26 RX ADMIN — ATORVASTATIN CALCIUM 80 MG: 40 TABLET, FILM COATED ORAL at 10:58

## 2020-01-26 RX ADMIN — ERGOCALCIFEROL 50000 UNITS: 1.25 CAPSULE ORAL at 10:58

## 2020-01-26 RX ADMIN — LEVOTHYROXINE SODIUM 25 MCG: 25 TABLET ORAL at 05:55

## 2020-01-26 RX ADMIN — Medication 10 ML: at 21:10

## 2020-01-26 RX ADMIN — Medication 400 MG: at 18:41

## 2020-01-26 RX ADMIN — SODIUM CHLORIDE 500 ML: 900 INJECTION, SOLUTION INTRAVENOUS at 18:44

## 2020-01-26 RX ADMIN — CLOPIDOGREL BISULFATE 75 MG: 75 TABLET, FILM COATED ORAL at 10:58

## 2020-01-26 RX ADMIN — Medication 10 ML: at 05:55

## 2020-01-26 RX ADMIN — ALLOPURINOL 100 MG: 100 TABLET ORAL at 21:10

## 2020-01-26 RX ADMIN — Medication 10 ML: at 11:18

## 2020-01-26 RX ADMIN — Medication 10 ML: at 14:00

## 2020-01-26 RX ADMIN — THERA TABS 1 TABLET: TAB at 10:57

## 2020-01-26 RX ADMIN — ASPIRIN 81 MG: 81 TABLET, COATED ORAL at 10:58

## 2020-01-26 RX ADMIN — Medication 400 MG: at 10:58

## 2020-01-26 RX ADMIN — FLUTICASONE PROPIONATE 2 SPRAY: 50 SPRAY, METERED NASAL at 10:59

## 2020-01-26 NOTE — PROGRESS NOTES
1958: Bedside shift change report given to [facility] (oncoming nurse) by Jennifer David (offgoing nurse). Report included the following information SBAR, Kardex, ED Summary, Intake/Output, MAR, Recent Results and Cardiac Rhythm SR, PVCs, inverted T wave. 0730: Bedside shift change report given to Jennifer David (oncoming nurse) by [facility] (offgoing nurse). Report included the following information SBAR, Kardex, Intake/Output, MAR, Recent Results and Cardiac Rhythm SR, inverted T wave.

## 2020-01-26 NOTE — PROGRESS NOTES
Hospitalist Progress Note    NAME: Bere Alonso   :  1945   MRN:  [id number]       Assessment / Plan:  Patient 69 response female admitted for acute kidney injury, elevated troponin, abnormal EKG, nausea vomiting,   Improving. 1.DANIELE, improving ; continue IV fluid, monitor intake output, renal function, avoid nephrotoxin, repeat labs tomorrow a.m.  2.  Elevated troponin, abnormal EKG, patient denies chest pain, no chest pain on presentation, troponin trending down no change, seen EKG from admission, discussed with cardiologist on-call. Advised consult cardiovascular specialist.  Awaiting call back. Spoke wit Dr. Dr. Reed Dee advised continue current management closely monitor  I asked him that I will transfer to Hospitalist if feel that cannot be managed her without ICU   3. History of coronary artery disease, status post CABG, no chest pain, troponin trending down. We will continue aspirin Plavix. 4.  Hyperlipidemia; resume Lipitor  5. Gout; no arthritis, continue allopurinol  6. Hypothyroidism ; resume levothyroxine     Code Status: Full code  Surrogate Decision Maker:     DVT Prophylaxis: Heparin  GI Prophylaxis: not indicated        Subjective:     Chief Complaint / Reason for Physician Visit  \"feeling better \". Discussed with RN events overnight. Review of Systems:  Symptom Y/N Comments  Symptom Y/N Comments   Fever/Chills    Chest Pain     Poor Appetite    Edema     Cough    Abdominal Pain     Sputum    Joint Pain     SOB/STEPHENOSN    Pruritis/Rash     Nausea/vomit    Tolerating PT/OT     Diarrhea    Tolerating Diet     Constipation    Other       Could NOT obtain due to:      Objective:     VITALS:   Last 24hrs VS reviewed since prior progress note.  Most recent are:  Patient Vitals for the past 24 hrs:   Temp Pulse Resp BP SpO2   20 1230  100 13 (!) 87/42    20 1219  (!) 101 15 98/45    20 1204  (!) 106 15 110/42 100 %   20 1149  99 19 90/41    20 1134  99 18 96/44    01/26/20 1119  (!) 101 20 90/44    01/26/20 1105  (!) 105 20 (!) 79/48    01/26/20 1000  100 15 (!) 88/46    01/26/20 0900  88 19 111/60 100 %   01/26/20 0730 96.9 °F (36.1 °C) 87 13 110/54 100 %   01/26/20 0600  84 13 99/54    01/26/20 0500  85 16 111/51    01/26/20 0400 98 °F (36.7 °C) 86 20 109/59 98 %   01/26/20 0300  86 13 95/46    01/26/20 0100  88 19 105/52    01/26/20 0006 97.3 °F (36.3 °C) 90 16 101/52 98 %   01/25/20 2300  92 14 98/45    01/25/20 2100 98.8 °F (37.1 °C) 93 15 104/51 98 %   01/25/20 1955   16 100/46 100 %   01/25/20 1905  (!) 103 20 (!) 85/37    01/25/20 1805  99 17 109/55    01/25/20 1745  96 23 91/73    01/25/20 1725  94 19 92/47    01/25/20 1655  95 18 (!) 87/48    01/25/20 1625  95 16 94/46    01/25/20 1610  93 21 92/50    01/25/20 1555  99 27 (!) 88/69    01/25/20 1540  86 20 90/44    01/25/20 1525 96.5 °F (35.8 °C) (!) 101 16 92/51 100 %   01/25/20 1510  (!) 102 18 (!) 91/37    01/25/20 1455  99 23 (!) 89/53    01/25/20 1440  (!) 101 22 (!) 88/43    01/25/20 1429  (!) 101 18 94/42    01/25/20 1424  (!) 102 18 (!) 81/38    01/25/20 1419  (!) 102 16 91/44    01/25/20 1414  (!) 103 18 (!) 88/44    01/25/20 1410  (!) 103 20 (!) 83/39    01/25/20 1400 96.1 °F (35.6 °C) (!) 108 18 (!) 79/39 100 %   01/25/20 1359  (!) 103 19 (!) 88/40    01/25/20 1355    (!) 88/34    01/25/20 1354  (!) 105 21 (!) 81/41    01/25/20 1351  (!) 103 25 (!) 68/50    01/25/20 1349  (!) 102 25 (!) 74/37    01/25/20 1344  (!) 102 19 (!) 77/53    01/25/20 1329  99 17 98/41        Intake/Output Summary (Last 24 hours) at 1/26/2020 1309  Last data filed at 1/26/2020 1051  Gross per 24 hour   Intake 3090 ml   Output 1550 ml   Net 1540 ml        PHYSICAL EXAM:  General: WD, WN. Alert, cooperative, no acute distress    EENT:  EOMI. Anicteric sclerae. MMM  Resp:  CTA bilaterally, no wheezing or rales.   No accessory muscle use  CV:  Regular rhythm,  No edema  GI:  Soft, Non distended, Non tender.  +Bowel sounds  Neurologic:  Alert and oriented X 3, normal speech,   Psych:   Good insight. Not anxious nor agitated  Skin:  No rashes. No jaundice    Reviewed most current lab test results and cultures  YES  Reviewed most current radiology test results   YES  Review and summation of old records today    NO  Reviewed patient's current orders and MAR    YES  PMH/SH reviewed - no change compared to H&P  ________________________________________________________________________  Care Plan discussed with:    Comments   Patient     Family      RN     Care Manager     Consultant                        Multidiciplinary team rounds were held today with , nursing, pharmacist and clinical coordinator. Patient's plan of care was discussed; medications were reviewed and discharge planning was addressed. ________________________________________________________________________  Total NON critical care TIME:  35 Minutes    Total CRITICAL CARE TIME Spent:   Minutes non procedure based      Comments   >50% of visit spent in counseling and coordination of care     ________________________________________________________________________  Magnolia Smith MD     Procedures: see electronic medical records for all procedures/Xrays and details which were not copied into this note but were reviewed prior to creation of Plan. LABS:  I reviewed today's most current labs and imaging studies.   Pertinent labs include:  Recent Labs     01/24/20  1619   WBC 8.6   HGB 14.0   HCT 41.0        Recent Labs     01/26/20  0623 01/25/20  0327 01/24/20  1619    132* 133*   K 3.8 4.0 4.0    97 96*   CO2 24 27 20*   GLU 80 103* 141*   BUN 25* 41* 40*   CREA 1.19* 2.05* 2.37*   CA 8.4* 8.4* 9.4   MG 2.1 1.6  --    ALB  --  3.0*  --    TBILI  --  0.8  --    SGOT  --  22  --    ALT  --  13  --        Signed: Alvenia Session, MD

## 2020-01-27 ENCOUNTER — APPOINTMENT (OUTPATIENT)
Dept: NON INVASIVE DIAGNOSTICS | Age: 75
End: 2020-01-27
Attending: EMERGENCY MEDICINE
Payer: MEDICARE

## 2020-01-27 LAB
ATRIAL RATE: 101 BPM
ATRIAL RATE: 127 BPM
AV PEAK GRADIENT: 66.56 MMHG
AV VELOCITY RATIO: 0.51
CALCULATED P AXIS, ECG09: 1 DEGREES
CALCULATED P AXIS, ECG09: 15 DEGREES
CALCULATED R AXIS, ECG10: -24 DEGREES
CALCULATED R AXIS, ECG10: -29 DEGREES
CALCULATED T AXIS, ECG11: 165 DEGREES
CALCULATED T AXIS, ECG11: 175 DEGREES
DIAGNOSIS, 93000: NORMAL
DIAGNOSIS, 93000: NORMAL
ECHO AO ROOT DIAM: 2.8 CM
ECHO AV AREA PEAK VELOCITY: 1.7 CM2
ECHO AV AREA/BSA PEAK VELOCITY: 0.9 CM2/M2
ECHO AV PEAK GRADIENT: 6.4 MMHG
ECHO AV PEAK VELOCITY: 126.48 CM/S
ECHO AV REGURGITANT PHT: 413.7 CM
ECHO EST RA PRESSURE: 5 MMHG
ECHO LA AREA 4C: 17.2 CM2
ECHO LA MAJOR AXIS: 5.19 CM
ECHO LA TO AORTIC ROOT RATIO: 1.85
ECHO LA VOL 4C: 45.1 ML (ref 22–52)
ECHO LA VOLUME INDEX A4C: 24.11 ML/M2 (ref 16–28)
ECHO LV EDV A4C: 130 ML
ECHO LV EDV INDEX A4C: 69.5 ML/M2
ECHO LV EDV TEICHHOLZ: 60.99 ML
ECHO LV EJECTION FRACTION A4C: 68 %
ECHO LV ESV A4C: 42.3 ML
ECHO LV ESV INDEX A4C: 22.6 ML/M2
ECHO LV ESV TEICHHOLZ: 30.47 ML
ECHO LV INTERNAL DIMENSION DIASTOLIC: 5.03 CM (ref 3.9–5.3)
ECHO LV INTERNAL DIMENSION SYSTOLIC: 3.75 CM
ECHO LV IVSD: 1.35 CM (ref 0.6–0.9)
ECHO LV MASS 2D: 338.1 G (ref 67–162)
ECHO LV MASS INDEX 2D: 180.7 G/M2 (ref 43–95)
ECHO LV POSTERIOR WALL DIASTOLIC: 1.36 CM (ref 0.6–0.9)
ECHO LVOT DIAM: 2.05 CM
ECHO LVOT PEAK GRADIENT: 1.7 MMHG
ECHO LVOT PEAK VELOCITY: 64.83 CM/S
ECHO MV A VELOCITY: 46.17 CM/S
ECHO MV AREA PHT: 2.9 CM2
ECHO MV AREA PLAN: 0.9 CM2
ECHO MV E DECELERATION TIME (DT): 137.9 MS
ECHO MV E VELOCITY: 29.3 CM/S
ECHO MV E/A RATIO: 0.63
ECHO MV MAX VELOCITY: 139.93 CM/S
ECHO MV MEAN GRADIENT: 3.7 MMHG
ECHO MV MEAN INFLOW VELOCITY: 0.92 M/S
ECHO MV PEAK GRADIENT: 7.8 MMHG
ECHO MV PRESSURE HALF TIME (PHT): 75.6 MS
ECHO MV VTI: 29.88 CM
ECHO PULMONARY ARTERY SYSTOLIC PRESSURE (PASP): 30.7 MMHG
ECHO PV REGURGITANT MAX VELOCITY: 196.88 CM/S
ECHO RA AREA 4C: 9.4 CM2
ECHO RIGHT VENTRICULAR SYSTOLIC PRESSURE (RVSP): 30.7 MMHG
ECHO TV REGURGITANT MAX VELOCITY: 253.49 CM/S
ECHO TV REGURGITANT PEAK GRADIENT: 25.7 MMHG
LVFS 2D: 25.51 %
LVSV (MOD SINGLE 4C): 44.55 ML
LVSV (TEICH): 30.52 ML
MV DEC SLOPE: 2.13
P-R INTERVAL, ECG05: 116 MS
P-R INTERVAL, ECG05: 164 MS
PISA AR MAX VEL: 407.93 CM/S
Q-T INTERVAL, ECG07: 406 MS
Q-T INTERVAL, ECG07: 420 MS
QRS DURATION, ECG06: 102 MS
QRS DURATION, ECG06: 94 MS
QTC CALCULATION (BEZET), ECG08: 544 MS
QTC CALCULATION (BEZET), ECG08: 590 MS
VENTRICULAR RATE, ECG03: 101 BPM
VENTRICULAR RATE, ECG03: 127 BPM

## 2020-01-27 PROCEDURE — 96372 THER/PROPH/DIAG INJ SC/IM: CPT

## 2020-01-27 PROCEDURE — 74011250637 HC RX REV CODE- 250/637: Performed by: NURSE PRACTITIONER

## 2020-01-27 PROCEDURE — 93306 TTE W/DOPPLER COMPLETE: CPT

## 2020-01-27 PROCEDURE — 74011250636 HC RX REV CODE- 250/636: Performed by: FAMILY MEDICINE

## 2020-01-27 PROCEDURE — 99218 HC RM OBSERVATION: CPT

## 2020-01-27 PROCEDURE — 74011250637 HC RX REV CODE- 250/637: Performed by: FAMILY MEDICINE

## 2020-01-27 RX ORDER — HEPARIN SODIUM 5000 [USP'U]/ML
5000 INJECTION, SOLUTION INTRAVENOUS; SUBCUTANEOUS EVERY 8 HOURS
Status: DISCONTINUED | OUTPATIENT
Start: 2020-01-27 | End: 2020-01-28 | Stop reason: HOSPADM

## 2020-01-27 RX ORDER — CARVEDILOL 3.12 MG/1
3.12 TABLET ORAL 2 TIMES DAILY WITH MEALS
Status: DISCONTINUED | OUTPATIENT
Start: 2020-01-27 | End: 2020-01-28

## 2020-01-27 RX ORDER — CARVEDILOL 3.12 MG/1
3.12 TABLET ORAL
Status: COMPLETED | OUTPATIENT
Start: 2020-01-27 | End: 2020-01-27

## 2020-01-27 RX ADMIN — HEPARIN SODIUM 5000 UNITS: 5000 INJECTION INTRAVENOUS; SUBCUTANEOUS at 20:38

## 2020-01-27 RX ADMIN — ALLOPURINOL 100 MG: 100 TABLET ORAL at 21:24

## 2020-01-27 RX ADMIN — Medication 400 MG: at 08:35

## 2020-01-27 RX ADMIN — HEPARIN SODIUM 5000 UNITS: 5000 INJECTION INTRAVENOUS; SUBCUTANEOUS at 12:15

## 2020-01-27 RX ADMIN — Medication 10 ML: at 21:24

## 2020-01-27 RX ADMIN — Medication 10 ML: at 15:02

## 2020-01-27 RX ADMIN — LEVOTHYROXINE SODIUM 25 MCG: 25 TABLET ORAL at 06:22

## 2020-01-27 RX ADMIN — CARVEDILOL 3.12 MG: 3.12 TABLET, FILM COATED ORAL at 17:30

## 2020-01-27 RX ADMIN — Medication 10 ML: at 06:23

## 2020-01-27 RX ADMIN — THERA TABS 1 TABLET: TAB at 08:35

## 2020-01-27 RX ADMIN — FLUTICASONE PROPIONATE 2 SPRAY: 50 SPRAY, METERED NASAL at 08:35

## 2020-01-27 RX ADMIN — CARVEDILOL 3.12 MG: 3.12 TABLET, FILM COATED ORAL at 10:22

## 2020-01-27 RX ADMIN — ASPIRIN 81 MG: 81 TABLET, COATED ORAL at 08:35

## 2020-01-27 RX ADMIN — CLOPIDOGREL BISULFATE 75 MG: 75 TABLET, FILM COATED ORAL at 08:35

## 2020-01-27 RX ADMIN — ATORVASTATIN CALCIUM 80 MG: 40 TABLET, FILM COATED ORAL at 08:35

## 2020-01-27 RX ADMIN — Medication 400 MG: at 17:29

## 2020-01-27 NOTE — PROGRESS NOTES
[facility] PHARMACY DAILY ANTICOAGULANT ASSESSMENT    Estimated body mass index is 39.26 kg/m² as calculated from the following:    Height as of this encounter: 152.4 cm (60\"). Weight as of this encounter: 91.2 kg (201 lb). Estimated Creatinine Clearance: 42.4 mL/min (A) (based on SCr of 1.19 mg/dL (H)). Lab Results   Component Value Date/Time    Creatinine 1.19 (H) 01/26/2020 06:23 AM     Lab Results   Component Value Date/Time    HGB 14.0 01/24/2020 04:19 PM     Lab Results   Component Value Date/Time    PLATELET 825 94/21/0502 04:19 PM     Assessment: hgb & plt OK; dose heparin as 5000 units SQ Q8H.     Thank you,    Suri Rodríguez, PHARMD, BCPS  Contact: [phone]

## 2020-01-27 NOTE — CONSULTS
1949 [address] [facility], [address]   Office Phone [phone]      CARDIOLOGY CONSULTATION       Date of  Admission: 1/24/2020  4:10 PM     Admission type:Emergency   Primary Care Maryjo Phelps MD     Attending Provider: Barak Lubin MD  Cardiology Provider: Yolanda Tapia DNP, ANP-BC    CC/REASON FOR CONSULT: Elevated troponin, history of atherosclerotic heart disease/CABG. Subjective: Khanh Padron is a 69 y.o. female admitted for DANIELE (acute kidney injury) ([facility] [N17.9]  Hypotension [I95.9]  DANIELE (acute kidney injury) ([facility] [address] [N17.9]  Hypoglycemia [E16.2]. Patient is a 15-year-old female who came to [address] [place] [address] with episodes of nausea vomiting and diarrhea, lab work showed acute kidney injury she was hypotensive. Troponin performed in the emergency room was mildly elevated 2 subsequent troponins were trending down. She had denied dyspnea on exertion or exertional chest pain. She has a history of CABG and stenting of the left main, her cardiologist is Dr. Sree Vasquez from [address] [place] [address]. Recent cardiac catheterization in December 2019 demonstrated patent left main stent and patent CABG grafts, medically treated CAD. Patient reports compliance on dual antiplatelet therapy. She is also maintained her heart failure medications. Patient presents in bed today with bedside echo in progress. She denies chest pain orthopnea paroxysmal nocturnal dyspnea lightheadedness or fatigue. 12/24/2019 Cardiac Cath:  CONCLUSIONS:  1. Severe 3-vessel native coronary artery disease as noted above with patent stent to the left main. 2.  Left internal mammary graft to the LAD patent and free of significant disease. 3.  Saphenous vein graft to the distal circumflex and marginal, patent and free of significant disease.   4.  Saphenous vein graft to the PDA, patent and free of significant disease with diffuse disease in the native vessel. 5.  Mildly depressed LV systolic function with posterior basal dyskinesis. 6.  Mildly elevated LVEDP.     RECOMMENDATIONS:  Continue medical therapy. 12/24/2019 ECHO    · Normal cavity size. Mild concentric hypertrophy. Low normal systolic dysfunction. Estimated left ventricular ejection fraction is 50 - 55%. Visually measured ejection fraction. Age-appropriate left ventricular diastolic function. · Moderately dilated left atrium. · Mild to moderate aortic valve regurgitation is present. · Mitral valve repaired with annuloplasty ring. Mitral annular calcification. Trace mitral valve regurgitation is present. · Image quality for this study was technically difficult. · Mildly dilated right ventricle. · There is no evidence of pulmonary hypertension.          Patient Active Problem List    Diagnosis Date Noted    Hypoglycemia 01/25/2020    Hypotension 01/24/2020    DANIELE (acute kidney injury) ([address] 01/24/2020    Hypokalemia 12/24/2019    Hypomagnesemia 12/24/2019    NSTEMI (non-ST elevated myocardial infarction) ([address] 05/31/2017    Alcoholism ([address] 05/27/2017    Elevated troponin 05/27/2017    CHF (congestive heart failure) ([address] 05/27/2017      Susan Arshad MD  Past Medical History:   Diagnosis Date    Arthritis     CAD (coronary artery disease)     CKD (chronic kidney disease) stage 3, GFR 30-59 ml/min ([address] 12/24/2019    Heart failure ([address]     Hypertension     NSTEMI (non-ST elevated myocardial infarction) ([address] 12/24/2019    Ventricular fibrillation ([address] 12/24/2019    AICD fired due to Vfib    Ventricular tachycardia ([address] 12/24/2019      Social History     Socioeconomic History    Marital status:      Spouse name: Not on file    Number of children: Not on file    Years of education: Not on file    Highest education level: Not on file   Tobacco Use    Smoking status: Former Smoker    Smokeless tobacco: Never Used   Substance and Sexual Activity    Alcohol use: Yes     Binge frequency: Less than monthly    Drug use: No    Sexual activity: Not Currently     Comment: Post Menopause   Social History Narrative    Patient lives alone . She is  and her only daughter  of dialysis related complications. Sister Janel Current to be her medical power of  . Full code. No Known Allergies   Family History   Problem Relation Age of Onset    Heart Disease Other         daughter with enlarged heart    Kidney Disease Other         daughter    Tuberculosis Father       Current Facility-Administered Medications   Medication Dose Route Frequency    acetaminophen (TYLENOL) tablet 650 mg  650 mg Oral Q4H PRN    allopurinoL (ZYLOPRIM) tablet 100 mg  100 mg Oral QHS    magnesium oxide (MAG-OX) tablet 400 mg  400 mg Oral BID    sodium chloride (NS) flush 5-40 mL  5-40 mL IntraVENous Q8H    sodium chloride (NS) flush 5-40 mL  5-40 mL IntraVENous PRN    aspirin delayed-release tablet 81 mg  81 mg Oral DAILY    atorvastatin (LIPITOR) tablet 80 mg  80 mg Oral DAILY    clopidogreL (PLAVIX) tablet 75 mg  75 mg Oral DAILY    ergocalciferol capsule 50,000 Units  50,000 Units Oral Q7D    fluticasone propionate (FLONASE) 50 mcg/actuation nasal spray 2 Spray  2 Spray Both Nostrils DAILY    therapeutic multivitamin (THERAGRAN) tablet 1 Tab  1 Tab Oral DAILY    levothyroxine (SYNTHROID) tablet 25 mcg  25 mcg Oral DAILY        Review of Symptoms:   11 systems reviewed, negative other than as stated in the HPI        Objective:      Visit Vitals  /56 (BP 1 Location: Right arm, BP Patient Position: At rest)   Pulse 97   Temp 98.2 °F (36.8 °C)   Resp 23   Ht 5' (1.524 m)   Wt 201 lb (91.2 kg)   SpO2 97%   Breastfeeding No   BMI 39.26 kg/m²       Physical Exam     General: Well developed, in no acute distress, cooperative and alert  HEENT: No carotid bruits, no JVD, trach is midline. Neck Supple, PERRL, EOM intact.   Heart:  Normal S1/S2 negative S3 or S4. Regular, 1/6 systolic murmur, no gallop or rub. Respiratory: Clear bilaterally x 4, no wheezing or rales  Abdomen:   Soft, non-tender, no masses, bowel sounds are active. Extremities:  No edema, normal cap refill, no cyanosis, atraumatic. Neuro: A&Ox3, speech clear, gait stable. Skin: Skin color is normal. No rashes or lesions. Non diaphoretic  Vascular: 2+ pulses symmetric in all extremities    Data Review:   Recent Labs     01/24/20  1619   WBC 8.6   HGB 14.0   HCT 41.0        Recent Labs     01/26/20  0623 01/25/20  0327 01/24/20  1619    132* 133*   K 3.8 4.0 4.0    97 96*   CO2 24 27 20*   GLU 80 103* 141*   BUN 25* 41* 40*   CREA 1.19* 2.05* 2.37*   CA 8.4* 8.4* 9.4   MG 2.1 1.6  --    ALB  --  3.0*  --    TBILI  --  0.8  --    SGOT  --  22  --    ALT  --  13  --        Recent Labs     01/25/20  0327 01/24/20  2219 01/24/20  1619   TROIQ 0.14* 0.16* 0.23*         Intake/Output Summary (Last 24 hours) at 1/27/2020 4996  Last data filed at 1/27/2020 9975  Gross per 24 hour   Intake 1503 ml   Output 2300 ml   Net -797 ml        Cardiographics    Telemetry: Sinus rhythm with T wave inversion  ECG: Sinus rhythm with diffuse T wave inversions  Echocardiogram: In progress today preliminary ejection fraction appears to be 50% final report to follow  CXRAY: No pulmonary edema       Assessment:       Active Problems:    Hypotension (1/24/2020)      DANIELE (acute kidney injury) ([address] (1/24/2020)      Hypoglycemia (1/25/2020)         Plan:     Patient is a 71-year-old female with a history of CABG and left main stenting, mitral valve repair presenting to the emergency room and admitted for acute kidney injury, nausea vomiting diarrhea. Mild troponin elevation upon arrival trending down. Asymptomatic of atherosclerotic heart disease at this time, preliminary report on echocardiogram shows ejection fraction unchanged from December 2019.   Due to hypotension she has been off heart failure medications and diuretics. She appears to be normotensive now. 1.  Atherosclerotic heart disease: Mild troponin elevation trending down, asymptomatic, non-STEMI versus demand ischemia secondary to hypotension. Blood pressure stable, start Coreg 3.125 mg twice a day. Hold if systolic blood pressure less than 799.     2.  Systolic heart failure: Ejection fraction 50%, restart carvedilol. 3.  Acute kidney injury: Creatinine improving. At time of discharge her furosemide can be changed to just as needed for edema or 3 pound weight gain. We will follow with patient tomorrow morning. Plan to discharge patient in the next 24 hours, she will follow-up with her primary cardiologist Dr. Harjinder Mera. Thank -You for this consultation.     Craven Cogan DNP, ANP-BC    Saadia Bautista MD

## 2020-01-27 NOTE — PROGRESS NOTES
1938: Bedside shift change report given to [facility] (oncoming nurse) by Janeen Reed (offgoing nurse). Report included the following information SBAR, Kardex, ED Summary, Intake/Output, MAR, Recent Results and Cardiac Rhythm SR, ST, inverted T wave. 0730: Bedside shift change report given to Sumit (oncoming nurse) by [facility] (offgoing nurse). Report included the following information SBAR, Kardex, ED Summary, Intake/Output, MAR, Recent Results and Cardiac Rhythm SR, ST, inverted T wave.

## 2020-01-27 NOTE — PROGRESS NOTES
Patient more stable today but still struggling with some hypotension, which started around 10am. BP has remained mostly in the 90's syst after that time, with a rare drop into the 70's without symptoms for patient. Gave 250 slow bolus this morning over 2 hours per MD orders. This evening at 1845 started another 500mL bolus NS to help maintain BP above 90 systolic. Patient is having good UO, about 800-900 mL this shift, with color lightening still bhavani. Lungs ausc several times and again just prior to last fluid bolus at 1845 for still only fine crackles in the LLL only, otherwise she is CTAB. Sats consistently %. Care management consult entered for discharge coordination and planning. Sister and especially granddaughter would like to be involved in dc planning. Patient is in good spirits today and has rested quite a bit. POC is for an echocardiogram in the morning tomorrow. Report given to JIMENEZ Mitchell for noc shift.

## 2020-01-27 NOTE — PROGRESS NOTES
Hospitalist Progress Note    NAME: Haven Einstein   :  1945   MRN:  [id number]       Assessment / Plan:  Patient 69 response female admitted for acute kidney injury, elevated troponin, abnormal EKG, nausea vomiting,   Improving. 1.DANIELE, improving ; continue IV fluid, monitor intake output, renal function, avoid nephrotoxin, repeat labs. 2.  Elevated troponin, abnormal EKG, patient denies chest pain, no chest pain on presentation, troponin trending down no change, seen EKG from admission, discussed with cardiologist on-call. Advised consult cardiovascular specialist.  Awaiting call back. Spoke wit Dr. Dr. Lore Wood advised continue current management closely monitor  I asked him that I will transfer to Hospitalist if feel that cannot be managed her without ICU   3. History of coronary artery disease, status post CABG, no chest pain, troponin trending down. We will continue aspirin Plavix. 4.  Hyperlipidemia; resume Lipitor  5. Gout; no arthritis, continue allopurinol  6. Hypothyroidism ; resume levothyroxine     Code Status: Full code  Surrogate Decision Maker:     DVT Prophylaxis: Heparin  GI Prophylaxis: not indicated        Subjective:     Chief Complaint / Reason for Physician Visit  \"feeling better \". Discussed with RN events overnight. Review of Systems:  Symptom Y/N Comments  Symptom Y/N Comments   Fever/Chills    Chest Pain     Poor Appetite    Edema     Cough    Abdominal Pain     Sputum    Joint Pain     SOB/STEPHENSON    Pruritis/Rash     Nausea/vomit    Tolerating PT/OT     Diarrhea    Tolerating Diet     Constipation    Other       Could NOT obtain due to:      Objective:     VITALS:   Last 24hrs VS reviewed since prior progress note.  Most recent are:  Patient Vitals for the past 24 hrs:   Temp Pulse Resp BP SpO2   20 1200 98.5 °F (36.9 °C) 88 18 126/70 98 %   20 1022  96  111/63    20 0818 98.2 °F (36.8 °C) 97 23 130/56 97 %   20 0330 97.9 °F (36.6 °C) 88 13 131/57 100 %   01/26/20 2330 98.3 °F (36.8 °C) 92 16 135/58 100 %   01/26/20 2100  90 17 103/51    01/26/20 2036  90 20 97/49 100 %   01/26/20 2013 97.7 °F (36.5 °C) 91 19 91/54 100 %   01/26/20 1900  94 19 90/47    01/26/20 1800  98 19 97/49    01/26/20 1700  92 19 95/50    01/26/20 1600 97.2 °F (36.2 °C) 95 14 108/45 100 %   01/26/20 1549  92 13 97/50    01/26/20 1530  92 15 91/50    01/26/20 1519  93 12 95/47        Intake/Output Summary (Last 24 hours) at 1/27/2020 1505  Last data filed at 1/27/2020 1225  Gross per 24 hour   Intake 3110 ml   Output 1950 ml   Net 1160 ml        PHYSICAL EXAM:  General: WD, WN. Alert, cooperative, no acute distress    EENT:  EOMI. Anicteric sclerae. MMM  Resp:  CTA bilaterally, no wheezing or rales. No accessory muscle use  CV:  Regular  rhythm,  No edema  GI:  Soft, Non distended, Non tender.  +Bowel sounds  Neurologic:  Alert and oriented X 3, normal speech,   Psych:   Good insight. Not anxious nor agitated  Skin:  No rashes. No jaundice    Reviewed most current lab test results and cultures  YES  Reviewed most current radiology test results   YES  Review and summation of old records today    NO  Reviewed patient's current orders and MAR    YES  PMH/SH reviewed - no change compared to H&P  ________________________________________________________________________  Care Plan discussed with:    Comments   Patient     Family      RN     Care Manager     Consultant                        Multidiciplinary team rounds were held today with , nursing, pharmacist and clinical coordinator. Patient's plan of care was discussed; medications were reviewed and discharge planning was addressed.      ________________________________________________________________________  Total NON critical care TIME:  35 Minutes    Total CRITICAL CARE TIME Spent:   Minutes non procedure based      Comments   >50% of visit spent in counseling and coordination of care ________________________________________________________________________  Bi Seymour MD     Procedures: see electronic medical records for all procedures/Xrays and details which were not copied into this note but were reviewed prior to creation of Plan. LABS:  I reviewed today's most current labs and imaging studies.   Pertinent labs include:  Recent Labs     01/24/20  1619   WBC 8.6   HGB 14.0   HCT 41.0        Recent Labs     01/26/20  0623 01/25/20  0327 01/24/20  1619    132* 133*   K 3.8 4.0 4.0    97 96*   CO2 24 27 20*   GLU 80 103* 141*   BUN 25* 41* 40*   CREA 1.19* 2.05* 2.37*   CA 8.4* 8.4* 9.4   MG 2.1 1.6  --    ALB  --  3.0*  --    TBILI  --  0.8  --    SGOT  --  22  --    ALT  --  13  --        Signed: Bi Seymour MD

## 2020-01-28 VITALS
HEIGHT: 60 IN | SYSTOLIC BLOOD PRESSURE: 99 MMHG | TEMPERATURE: 98.4 F | BODY MASS INDEX: 41.99 KG/M2 | DIASTOLIC BLOOD PRESSURE: 47 MMHG | WEIGHT: 213.9 LBS | RESPIRATION RATE: 15 BRPM | HEART RATE: 86 BPM | OXYGEN SATURATION: 100 %

## 2020-01-28 PROCEDURE — 97116 GAIT TRAINING THERAPY: CPT

## 2020-01-28 PROCEDURE — 96372 THER/PROPH/DIAG INJ SC/IM: CPT

## 2020-01-28 PROCEDURE — 74011250636 HC RX REV CODE- 250/636: Performed by: FAMILY MEDICINE

## 2020-01-28 PROCEDURE — 97161 PT EVAL LOW COMPLEX 20 MIN: CPT

## 2020-01-28 PROCEDURE — 74011250637 HC RX REV CODE- 250/637: Performed by: FAMILY MEDICINE

## 2020-01-28 PROCEDURE — 99218 HC RM OBSERVATION: CPT

## 2020-01-28 PROCEDURE — 74011250637 HC RX REV CODE- 250/637: Performed by: NURSE PRACTITIONER

## 2020-01-28 RX ORDER — LANOLIN ALCOHOL/MO/W.PET/CERES
400 CREAM (GRAM) TOPICAL 2 TIMES DAILY
Qty: 20 TAB | Refills: 0 | Status: SHIPPED | OUTPATIENT
Start: 2020-01-28

## 2020-01-28 RX ORDER — FUROSEMIDE 20 MG/1
20 TABLET ORAL DAILY
Qty: 30 TAB | Refills: 0 | Status: SHIPPED | OUTPATIENT
Start: 2020-01-28 | End: 2020-04-06

## 2020-01-28 RX ADMIN — Medication 400 MG: at 08:13

## 2020-01-28 RX ADMIN — Medication 10 ML: at 05:29

## 2020-01-28 RX ADMIN — ATORVASTATIN CALCIUM 80 MG: 40 TABLET, FILM COATED ORAL at 08:13

## 2020-01-28 RX ADMIN — Medication 10 ML: at 13:49

## 2020-01-28 RX ADMIN — CLOPIDOGREL BISULFATE 75 MG: 75 TABLET, FILM COATED ORAL at 08:13

## 2020-01-28 RX ADMIN — FLUTICASONE PROPIONATE 2 SPRAY: 50 SPRAY, METERED NASAL at 08:14

## 2020-01-28 RX ADMIN — HEPARIN SODIUM 5000 UNITS: 5000 INJECTION INTRAVENOUS; SUBCUTANEOUS at 12:04

## 2020-01-28 RX ADMIN — CARVEDILOL 3.12 MG: 3.12 TABLET, FILM COATED ORAL at 08:13

## 2020-01-28 RX ADMIN — THERA TABS 1 TABLET: TAB at 08:13

## 2020-01-28 RX ADMIN — ASPIRIN 81 MG: 81 TABLET, COATED ORAL at 08:13

## 2020-01-28 RX ADMIN — HEPARIN SODIUM 5000 UNITS: 5000 INJECTION INTRAVENOUS; SUBCUTANEOUS at 04:14

## 2020-01-28 RX ADMIN — LEVOTHYROXINE SODIUM 25 MCG: 25 TABLET ORAL at 05:30

## 2020-01-28 RX ADMIN — Medication 400 MG: at 18:18

## 2020-01-28 NOTE — PROGRESS NOTES
Continuation of Care-  Met with patient today in order to discuss discharge planning. In good mood and smiling stating that she feels better. IDT met to discuss plan of care and patient may d/c home today if stable. Discharge planning  1. Post discharge appointments made. 2.  Cardiology has recommended that patient get a scale to weigh self daily and a BP cuff for use at home. Patient states that she plans to get a scale at [address] and has a BP cuff at home. 3.  Patient stated that her granddaughter or her friend will provide transportation home at time of discharge. 4. H2H requested has been sent to [facility].  these medications from [facility] #[id number]- [place], [place] - [address]    magnesium oxide      Follow up with [facility]    [address]   [address]   [phone]    The nurse will call you to make an appointment to visit. Pharmacy    Next steps: Follow up    [facility] at [address]     Contact number [phone]   [facility] [id number]    Please  your medications at your pharmacy      Follow up with Brandy Man MD on 2/3/2020    Specialty: Cardiology    [address]   [id number]   [address] [phone]    Your appointment time is 3:30pm.  You will be seeing MAGO Vergara NP.  , Bring ins card, picture id, and discharge papers, Please keep this appointment, Please arrive 15 minutes early.      ABHINAV Leary/PAULINA  [phone]

## 2020-01-28 NOTE — PROGRESS NOTES
PT orders received chart reviewed. Pt sitting in chair on arrival, BP 76/58. Nursing aware, will follow up for PT Evaluation.     Moon Henley, PT

## 2020-01-28 NOTE — PROGRESS NOTES
Problem: Mobility Impaired (Adult and Pediatric)  Goal: *Acute Goals and Plan of Care (Insert Text)  Description  FUNCTIONAL STATUS PRIOR TO ADMISSION: Patient was modified independent using a single point cane for functional mobility. HOME SUPPORT PRIOR TO ADMISSION: The patient lived alone with family and friends to provide assistance. Physical Therapy Goals  Initiated 1/28/2020  1. Patient will move from supine to sit and sit to supine , scoot up and down and roll side to side in bed with modified independence within 7 day(s). 2.  Patient will transfer from bed to chair and chair to bed with modified independence using the least restrictive device within 7 day(s). 3.  Patient will perform sit to stand with modified independence within 7 day(s). 4.  Patient will ambulate with modified independence for 200 feet with the least restrictive device within 7 day(s). Outcome: Progressing Towards Goal  PHYSICAL THERAPY EVALUATION  Patient: Nesha Mccann (50 y.o. female)  Date: 1/28/2020  Primary Diagnosis: DANIELE (acute kidney injury) ([facility] [N17.9]  Hypotension [I95.9]  DANIELE (acute kidney injury) ([facility] [N17.9]  Hypoglycemia [E16.2]        Precautions: fall         ASSESSMENT  Based on the objective data described below, the patient presents with functional mobility that appears to be close to her baseline. She needed Supervision level assistance for bed mobility and sit to stand transfer. She ambulated 100' using her single point cane. She demonstrated decreased step clearance and increased trunk sway while walking. She became mildly SOB and sat down quickly on return to room. Pt has family and friends that live close by her and help her as needed. Pt has all necessary DME at home. She declined HH PT at this time to introduce exercises and HEP, she reports that she will ask her PCP if she feels she needs PT. Encouraged her to do LE exercises while sitting.       Current Level of Function Impacting Discharge (mobility/balance): Pt likely close to her mobility baseline, fatigues qucikly. Patient will benefit from skilled therapy intervention to address the above noted impairments. PLAN :  Recommendations and Planned Interventions: bed mobility training, transfer training, gait training, and therapeutic exercises      Frequency/Duration: Patient will be followed by physical therapy:  2 times a week to address goals. Recommendation for discharge: (in order for the patient to meet his/her long term goals)  No skilled physical therapy/ follow up rehabilitation needs identified at this time. This discharge recommendation:  Has not yet been discussed the attending provider and/or case management    IF patient discharges home will need the following DME: none         SUBJECTIVE:   Patient stated I feel better today.     OBJECTIVE DATA SUMMARY:   HISTORY:    Past Medical History:   Diagnosis Date    Arthritis     CAD (coronary artery disease)     CKD (chronic kidney disease) stage 3, GFR 30-59 ml/min ([facility]) 12/24/2019    Heart failure ([facility])     Hypertension     NSTEMI (non-ST elevated myocardial infarction) ([facility] 12/24/2019    Ventricular fibrillation ([facility] [facility] 12/24/2019    AICD fired due to Vfib    Ventricular tachycardia ([facility] [address] [facility] 12/24/2019     Past Surgical History:   Procedure Laterality Date    CARDIAC SURG PROCEDURE UNLIST      pacemaker/defibrilator placed 2 2015    HX CORONARY ARTERY BYPASS GRAFT  2015    2v.     HX GYN      hysterectomy       Personal factors and/or comorbidities impacting plan of care:     Home Situation  Home Environment: Apartment  # Steps to Enter: 0  One/Two Story Residence: One story  # of Interior Steps: 0  Height of Each Step (in): (NA)  Interior Rails: None  Lift Chair Available: No  Living Alone: Yes  Support Systems: Family member(s), Friends \ neighbors  Patient Expects to be Discharged to[de-identified] Apartment  Current DME Used/Available at Home: Cane, straight, Grab bars    EXAMINATION/PRESENTATION/DECISION MAKING:   Critical Behavior:          Strength:  Decreased, WFL    Functional Mobility:  Bed Mobility:  Rolling: Supervision  Supine to Sit: Supervision    Transfers:  Sit to Stand: Supervision  Stand to Sit: Supervision  Balance:   Sitting: Intact  Standing: Impaired  Standing - Static: Good  Standing - Dynamic : Fair  Ambulation/Gait Training:  Distance (ft): 100 Feet (ft)  Assistive Device: Cane, straight  Ambulation - Level of Assistance: Contact guard assistance  Gait Abnormalities: Decreased step clearance;Trunk sway increased  Base of Support: Widened  Speed/Meghan: Fluctuations  Step Length: Left shortened;Right shortened    Therapeutic Exercises:   Seated ankle pumps and marching in place. Activity Tolerance:   Fair  Please refer to the flowsheet for vital signs taken during this treatment. After treatment patient left in no apparent distress:   Sitting in chair and Call bell within reach    COMMUNICATION/EDUCATION:   The patients plan of care was discussed with: Registered Nurse. Fall prevention education was provided and the patient/caregiver indicated understanding., Patient/family have participated as able in goal setting and plan of care. , and Patient/family agree to work toward stated goals and plan of care.     Thank you for this referral.  Kelechi Acosta, PT   Time Calculation: 20 mins

## 2020-01-28 NOTE — PROGRESS NOTES
Pt discharge home. AVS discharge instruction reviewed with pt.prescription medicine sent to pt prefer pharmacy. pt received written instruction regarding her medicine with verbal feedback. iv line removed. pt has all her belonging. pt wheel down to parking lot. pt left with her friend.

## 2020-01-28 NOTE — CONSULTS
1949 [address], [address]  Office Phone [phone]    CARDIOLOGY PROGRESS NOTE    1/28/2020 9:33 AM    Admit Date: 1/24/2020    Admit Diagnosis:   DANIELE (acute kidney injury) ([facility] [N17.9]  Hypotension [I95.9]  DANIELE (acute kidney injury) ([facility] [N17.9]  Hypoglycemia [E16.2]    Subjective: Syl Carrion 70 y/o female with CAD hx, adm for DANIELE and elevated troponin trending down. Reports feeling well this AM, denies STEPHENSON/SOB or chest pain. Denies N/V/D.  Restarted Coreg 3.125mg yesterday AM BP systolics in the high 60'F/MMC 90's upright in chair eating breakfast.     Visit Vitals  /62 (BP 1 Location: Right arm, BP Patient Position: At rest)   Pulse 91   Temp 98.4 °F (36.9 °C)   Resp 21   Ht 5' (1.524 m)   Wt 213 lb 14.4 oz (97 kg)   SpO2 97%   Breastfeeding No   BMI 41.77 kg/m²       Current Facility-Administered Medications   Medication Dose Route Frequency    carvediloL (COREG) tablet 3.125 mg  3.125 mg Oral BID WITH MEALS    heparin (porcine) injection 5,000 Units  5,000 Units SubCUTAneous Q8H    acetaminophen (TYLENOL) tablet 650 mg  650 mg Oral Q4H PRN    allopurinoL (ZYLOPRIM) tablet 100 mg  100 mg Oral QHS    magnesium oxide (MAG-OX) tablet 400 mg  400 mg Oral BID    sodium chloride (NS) flush 5-40 mL  5-40 mL IntraVENous Q8H    sodium chloride (NS) flush 5-40 mL  5-40 mL IntraVENous PRN    aspirin delayed-release tablet 81 mg  81 mg Oral DAILY    atorvastatin (LIPITOR) tablet 80 mg  80 mg Oral DAILY    clopidogreL (PLAVIX) tablet 75 mg  75 mg Oral DAILY    ergocalciferol capsule 50,000 Units  50,000 Units Oral Q7D    fluticasone propionate (FLONASE) 50 mcg/actuation nasal spray 2 Spray  2 Spray Both Nostrils DAILY    therapeutic multivitamin (THERAGRAN) tablet 1 Tab  1 Tab Oral DAILY    levothyroxine (SYNTHROID) tablet 25 mcg  25 mcg Oral DAILY         Objective: Physical Exam    General: Well developed, in no acute distress, cooperative and alert  HEENT: No carotid bruits, no JVD, trach is midline. Neck Supple, PERRL, EOM intact. Heart:  Normal S1/S2 negative S3 or S4. Regular, 1/6 diastolic murmur, no gallop or rub. Respiratory: Clear bilaterally x 4, no wheezing or rales  Abdomen:   Soft, non-tender, no masses, bowel sounds are active. Extremities:  No edema, normal cap refill, no cyanosis, atraumatic. Neuro: A&Ox3, speech clear. Skin: Skin color is normal. No rashes or lesions. Non diaphoretic  Vascular: 2+ pulses symmetric in all extremities      ECHO:  · Normal cavity size and systolic function (ejection fraction normal). Mild concentric hypertrophy. Estimated left ventricular ejection fraction is 50 - 55%. Visually measured ejection fraction. No regional wall motion abnormality noted. · Mildly dilated left atrium. · Moderate aortic valve regurgitation is present. · Mitral valve thickening. Moderate mitral annular calcification. · Mild tricuspid valve regurgitation is present. · There is no evidence of pulmonary hypertension. Data Review:   No results for input(s): WBC, HGB, HCT, PLT, HGBEXT, HCTEXT, PLTEXT in the last 72 hours. Recent Labs     01/26/20  0623      K 3.8      CO2 24   GLU 80   BUN 25*   CREA 1.19*   CA 8.4*   MG 2.1       No results for input(s): TROIQ, CPK, CKMB in the last 72 hours.       Intake/Output Summary (Last 24 hours) at 1/28/2020 0933  Last data filed at 1/28/2020 0810  Gross per 24 hour   Intake 740 ml   Output 1750 ml   Net -1010 ml        Telemetry: Sinus w/ t wave inversion   EKG:  Cxray:    Assessment:     Active Problems:    Hypotension (1/24/2020)      DANIELE (acute kidney injury) ([address] (1/24/2020)      Hypoglycemia (1/25/2020)        Plan:     Patient is a 52-year-old female with a history of CABG and left main stenting, mitral valve repair presenting to the emergency room and admitted for acute kidney injury, nausea vomiting diarrhea. Mild troponin elevation upon arrival trending down. Asymptomatic of atherosclerotic heart disease at this time, echocardiogram shows ejection fraction unchanged from December 2019 50-55%. Due to hypotension she has been off heart failure medications and diuretics. Restarted low dose Coreg 3.125mg po bid yesterday, she is hypotensive this AM; asymptomatic Systolic 27ON/LA      1. Atherosclerotic heart disease: Mild troponin elevation trending down, asymptomatic, non-STEMI versus demand ischemia secondary to hypotension.     2. Systolic heart failure: Ejection fraction 50%, D/C Coreg for now. Can restart by out patient cardiology. Ambulated patient into hallway then back to room /62 with activity.      3. Acute kidney injury: Creatinine improving. At time of discharge her furosemide can be changed to just as needed for edema or 3 pound weight gain.     Patient will need a home scale to monitor for weight gain as well as a home BP cuff. Can D/C from Cardiology stand point later today if BP improves once the AM dose of Coreg wears off.      Larence Heimlich DNP-ANP-BC

## 2020-01-28 NOTE — DISCHARGE INSTRUCTIONS
Check daily weight, start fluid pill if weight gain more than 3 lbs, make appointment with cardiology

## 2020-01-28 NOTE — PROGRESS NOTES
Spiritual Care Partner Volunteer visited patient in PCU at [facility] on 01/28/2020.   Documented by:  Aspen Jackson, [facility], Spiritual Care Intern

## 2020-01-28 NOTE — PROGRESS NOTES
Virtual reviewer communicated change to CM, which reflect outpatient observation order written prior to Written discharge order. Code 44 delivered to patient. CM met with the patient and provided to the patient the printed information about her outpatient observation status. The patient was given the flyer entitled, \"Medicare Outpatient Observation: Information & notification. \" All questions were answered, no additional discharge needs identified at this time and patient expects to return to their home after discharge.     ABHINAV Byrd/PAULINA  [phone]

## 2020-01-28 NOTE — PROGRESS NOTES
Hospitalist Progress Note    NAME: Cassidy Fleming   :  1945   MRN:  [id number]       Assessment / Plan:  Patient 69 response female admitted for acute kidney injury, elevated troponin, abnormal EKG, nausea vomiting,   Improving. 1.DANIELE, improving ; continue IV fluid, monitor intake output, renal function, avoid nephrotoxin, repeat labs. 2.  Elevated troponin, abnormal EKG, patient denies chest pain, no chest pain on presentation, troponin trending down no change, seen EKG from admission, discussed with cardiologist on-call. Advised consult cardiovascular specialist.  Awaiting call back. Spoke wit Dr. Dr. Nora Jasso advised continue current management closely monitor  I asked him that I will transfer to Hospitalist if feel that cannot be managed her without ICU   3. History of coronary artery disease, status post CABG, no chest pain, troponin trending down. We will continue aspirin Plavix. 4.  Hyperlipidemia; resume Lipitor  5. Gout; no arthritis, continue allopurinol  6. Hypothyroidism ; resume levothyroxine   7. Hypertension; hold Coreg, continue monitoring will discharge if blood pressure more stable later today. Code Status: Full code  Surrogate Decision Maker:     DVT Prophylaxis: Heparin  GI Prophylaxis: not indicated        Subjective:     Chief Complaint / Reason for Physician Visit  \"Feeling good\". Discussed with RN events overnight. Review of Systems:  Symptom Y/N Comments  Symptom Y/N Comments   Fever/Chills    Chest Pain     Poor Appetite    Edema     Cough    Abdominal Pain     Sputum    Joint Pain     SOB/STEPHENSON    Pruritis/Rash     Nausea/vomit    Tolerating PT/OT     Diarrhea    Tolerating Diet     Constipation    Other       Could NOT obtain due to:      Objective:     VITALS:   Last 24hrs VS reviewed since prior progress note.  Most recent are:  Patient Vitals for the past 24 hrs:   Temp Pulse Resp BP SpO2   20 1152 98.3 °F (36.8 °C) 91 17 102/67 100 %   20 1000  97 18 90/79    01/28/20 0930  97 18 (!) 84/49    01/28/20 0811 98.4 °F (36.9 °C) 91 21 116/62 97 %   01/28/20 0809  92 18 98/53    01/28/20 0400 98.1 °F (36.7 °C) 86 21 107/51 100 %   01/28/20 0002 98 °F (36.7 °C) 90 22 103/50 97 %   01/27/20 2039 98.4 °F (36.9 °C) 86 18 93/52 98 %   01/27/20 1730  93  140/59    01/27/20 1600 98.2 °F (36.8 °C) 84 16 108/52 97 %       Intake/Output Summary (Last 24 hours) at 1/28/2020 1351  Last data filed at 1/28/2020 1349  Gross per 24 hour   Intake 660 ml   Output 1650 ml   Net -990 ml        PHYSICAL EXAM:  General: WD, WN. Alert, cooperative, no acute distress    EENT:  EOMI. Anicteric sclerae. MMM  Resp:  CTA bilaterally, no wheezing or rales. No accessory muscle use  CV:  Regular  rhythm,  No edema  GI:  Soft, Non distended, Non tender.  +Bowel sounds  Neurologic:  Alert and oriented X 3, normal speech,   Psych:   Good insight. Not anxious nor agitated  Skin:  No rashes. No jaundice    Reviewed most current lab test results and cultures  YES  Reviewed most current radiology test results   YES  Review and summation of old records today    NO  Reviewed patient's current orders and MAR    YES  PMH/SH reviewed - no change compared to H&P  ________________________________________________________________________  Care Plan discussed with:    Comments   Patient     Family      RN     Care Manager     Consultant                        Multidiciplinary team rounds were held today with , nursing, pharmacist and clinical coordinator. Patient's plan of care was discussed; medications were reviewed and discharge planning was addressed.      ________________________________________________________________________  Total NON critical care TIME:  35 Minutes    Total CRITICAL CARE TIME Spent:   Minutes non procedure based      Comments   >50% of visit spent in counseling and coordination of care     ________________________________________________________________________  [facility] Cosme Chowdhury MD     Procedures: see electronic medical records for all procedures/Xrays and details which were not copied into this note but were reviewed prior to creation of Plan. LABS:  I reviewed today's most current labs and imaging studies. Pertinent labs include:  No results for input(s): WBC, HGB, HCT, PLT, HGBEXT, HCTEXT, PLTEXT, HGBEXT, HCTEXT, PLTEXT in the last 72 hours.   Recent Labs     01/26/20  0623      K 3.8      CO2 24   GLU 80   BUN 25*   CREA 1.19*   CA 8.4*   MG 2.1       Signed: Mike Hanks MD

## 2020-01-28 NOTE — PROGRESS NOTES
Pt condition stable throughout the shift. BP stayed above 100's. pt denies any chest pain. 1920 . Bedside and Verbal shift change report given to [address] (oncoming nurse) by Leander Quinonez rn (offgoing nurse). Report included the following information SBAR, Kardex, Intake/Output, MAR, Recent Results, Med Rec Status and Cardiac Rhythm NSR\STACH.

## 2020-01-28 NOTE — DISCHARGE SUMMARY
Hospitalist Discharge Summary     Patient ID:  Bere Alonso  [id number]  89 y.o.  1945  1/24/2020    PCP on record: Oscar Mathews MD    Admit date: 1/24/2020  Discharge date and time: 1/28/2020    DISCHARGE DIAGNOSIS:  DANIELE, orthostatic hypotension  CHF    CONSULTATIONS:  IP CONSULT TO HOSPITALIST  IP CONSULT TO CARDIOLOGY    Excerpted HPI from H&P of Mike Hanks MD:  HISTORY OF PRESENT ILLNESS:     Viviana Guerrero is a 69 y.o.  female past medical history significant for arthritis, coronary artery disease, CKD, heart failure, hypertension, non-STEMI, V. fib, ventricular tachycardia who presents with nausea vomiting. Patient reports since Blank has not been feeling well, reports episodes of AICD fired due to ventricular fibrillation. Reports had similar symptoms 2 years ago, came in to [facility].  Did not have any chest pain shortness of breath. Reports of loose stool. Did not measure temperature, states maybe she had some fever. STEMI, Trop peak at 1.43 cardiology consulted. Antiarrhythmic agent stopped. TTE 12/24/2019 showed low systolic function ejection fraction 21-12, diastolic dysfunction. Cardiac catheterization 12/26/2019 shows severe three-vessel coronary artery disease, ejection fraction 35 to 40%. Advise follow-up with Dr. Kacie Moore.     We were asked to admit for work up and evaluation of the above problems.        ______________________________________________________________________  DISCHARGE SUMMARY/HOSPITAL COURSE:  for full details see H&P, daily progress notes, labs, consult notes. Patient 69 response female admitted for acute kidney injury, elevated troponin, abnormal EKG, nausea vomiting,   Improving. 1.DANIELE resolved ; continue IV fluid, monitor intake output, renal function, avoid nephrotoxin, repeat labs tomorrow a.m..   2.  Elevated troponin, picked , abnormal EKG, patient denies chest pain, no chest pain on presentation, troponin trending down no change, seen EKG from admission, discussed with cardiologist on-call. Advised consult cardiovascular specialist.  Awaiting call back. Spoke wit Dr. Dr. Eder Argueta advised continue current management closely monitor  I asked him that I will transfer to Hospitalist if feel that cannot be managed her without ICU   3. History of coronary artery disease, status post CABG, no chest pain, troponin trending down. We will continue aspirin Plavix. 4.  Hyperlipidemia; resume Lipitor  5. Gout; no arthritis, continue allopurinol  6. Hypothyroidism ; resume levothyroxine     Code Status: Full code  Surrogate Decision Maker:     DVT Prophylaxis: Heparin  GI Prophylaxis: not indicated     Baseline: Independent                         _______________________________________________________________________  Patient seen and examined by me on discharge day. Pertinent Findings:  Gen:    Not in distress  Chest: Clear lungs  CVS:   Regular rhythm. No edema  Abd:  Soft, not distended, not tender  Neuro:  Alert,orientedx3  _______________________________________________________________________  DISCHARGE MEDICATIONS:   Current Discharge Medication List      START taking these medications    Details   magnesium oxide (MAG-OX) 400 mg tablet Take 1 Tab by mouth two (2) times a day. Qty: 20 Tab, Refills: 0         CONTINUE these medications which have NOT CHANGED    Details   DULoxetine (CYMBALTA) 30 mg capsule Take 30 mg by mouth daily. cyanocobalamin 1,000 mcg tablet TAKE 1 TABLET BY MOUTH EVERY DAY      ergocalciferol (ERGOCALCIFEROL) 1,250 mcg (50,000 unit) capsule every seven (7) days. SUNDAYS      levothyroxine (SYNTHROID) 25 mcg tablet Take 25 mcg by mouth Daily (before breakfast). fluticasone propionate (FLONASE ALLERGY RELIEF) 50 mcg/actuation nasal spray 2 Sprays by Both Nostrils route daily.  PRN allergy season  Indications: inflammation of the nose due to an allergy      multivitamin (ONE A DAY) tablet Take 1 Tab by mouth daily. aspirin delayed-release 81 mg tablet Take 81 mg by mouth daily. allopurinol (ZYLOPRIM) 100 mg tablet Take 100 mg by mouth nightly. atorvastatin (LIPITOR) 80 mg tablet Take 80 mg by mouth daily. clopidogrel (PLAVIX) 75 mg tab Take 75 mg by mouth. STOP taking these medications       lisinopril (PRINIVIL, ZESTRIL) 5 mg tablet Comments:   Reason for Stopping:         potassium chloride SR (K-TAB) 20 mEq tablet Comments:   Reason for Stopping:         carvedilol (COREG) 25 mg tablet Comments:   Reason for Stopping:         furosemide (LASIX) 40 mg tablet Comments:   Reason for Stopping:         traMADol (ULTRAM) 50 mg tablet Comments:   Reason for Stopping:                 Patient Follow Up Instructions: Activity: Activity as tolerated  Diet: Cardiac Diet  Wound Care: None needed    Follow-up with cardiology  in 2 weeks. Follow-up tests/labs none pending  Follow-up Information     Follow up With Specialties Details Why Contact Thomas Lerner MD Family Practice On 1/27/2020 Please call on Monday and schedule follow-up appointment. [facility]      Bette Weber MD Cardiology On 2/3/2020 Your appointment time is 3:30pm.  Sandi Michaels will be seeing MAGO Waite NP.  , Bring ins card, picture id, and discharge papers, Please keep this appointment, Please arrive 15 minutes early.  [address]  [id number]  [facility] [address] [phone]          ________________________________________________________________    Risk of deterioration: Moderate    Condition at Discharge:  Stable  __________________________________________________________________    Disposition  Home with family and home health services    ____________________________________________________________________    Code Status: Full Code  ___________________________________________________________________      Total time in minutes spent coordinating this discharge (includes going over instructions, follow-up, prescriptions, and preparing report for sign off to her PCP) :  35 minutes    Signed:  Mike Hanks MD

## 2020-01-29 ENCOUNTER — HOME HEALTH ADMISSION (OUTPATIENT)
Dept: HOME HEALTH SERVICES | Facility: HOME HEALTH | Age: 75
End: 2020-01-29

## 2020-02-07 ENCOUNTER — HOME CARE VISIT (OUTPATIENT)
Dept: SCHEDULING | Facility: HOME HEALTH | Age: 75
End: 2020-02-07

## 2020-02-07 PROCEDURE — G0299 HHS/HOSPICE OF RN EA 15 MIN: HCPCS

## 2020-02-18 ENCOUNTER — HOME CARE VISIT (OUTPATIENT)
Dept: SCHEDULING | Facility: HOME HEALTH | Age: 75
End: 2020-02-18

## 2020-02-18 PROCEDURE — G0299 HHS/HOSPICE OF RN EA 15 MIN: HCPCS

## 2022-03-18 PROBLEM — I21.4 NSTEMI (NON-ST ELEVATED MYOCARDIAL INFARCTION) (HCC): Status: ACTIVE | Noted: 2017-05-31

## 2022-03-18 PROBLEM — I95.9 HYPOTENSION: Status: ACTIVE | Noted: 2020-01-24

## 2022-03-18 PROBLEM — E87.6 HYPOKALEMIA: Status: ACTIVE | Noted: 2019-12-24

## 2022-03-18 PROBLEM — F10.20 ALCOHOLISM (HCC): Status: ACTIVE | Noted: 2017-05-27

## 2022-03-19 PROBLEM — E83.42 HYPOMAGNESEMIA: Status: ACTIVE | Noted: 2019-12-24

## 2022-03-19 PROBLEM — I50.9 CHF (CONGESTIVE HEART FAILURE) (HCC): Status: ACTIVE | Noted: 2017-05-27

## 2022-03-19 PROBLEM — R79.89 ELEVATED TROPONIN: Status: ACTIVE | Noted: 2017-05-27

## 2022-03-19 PROBLEM — E16.2 HYPOGLYCEMIA: Status: ACTIVE | Noted: 2020-01-25

## 2022-03-19 PROBLEM — N17.9 AKI (ACUTE KIDNEY INJURY) (HCC): Status: ACTIVE | Noted: 2020-01-24

## 2022-03-19 PROBLEM — R77.8 ELEVATED TROPONIN: Status: ACTIVE | Noted: 2017-05-27

## 2022-11-18 ENCOUNTER — HOSPITAL ENCOUNTER (OUTPATIENT)
Dept: GENERAL RADIOLOGY | Age: 77
Discharge: HOME | End: 2022-11-18
Payer: MEDICARE

## 2022-11-18 ENCOUNTER — TRANSCRIBE ORDER (OUTPATIENT)
Dept: REGISTRATION | Age: 77
End: 2022-11-18

## 2022-11-18 DIAGNOSIS — M25.569 KNEE PAIN: Primary | ICD-10-CM

## 2022-11-18 DIAGNOSIS — M25.569 KNEE PAIN: ICD-10-CM

## 2022-11-18 PROCEDURE — 73562 X-RAY EXAM OF KNEE 3: CPT

## 2022-12-13 NOTE — ED NOTES
Primary Nurse Jovana Garay RN and Marnie Wolfe RN performed a dual skin assessment on this patient Impairment noted- see wound doc flow sheet  Roverto score is 23 show

## 2023-10-28 ENCOUNTER — HOSPITAL ENCOUNTER (INPATIENT)
Facility: HOSPITAL | Age: 78
LOS: 7 days | Discharge: HOME | DRG: 574 | End: 2023-11-04
Attending: STUDENT IN AN ORGANIZED HEALTH CARE EDUCATION/TRAINING PROGRAM | Admitting: INTERNAL MEDICINE
Payer: MEDICARE

## 2023-10-28 ENCOUNTER — APPOINTMENT (OUTPATIENT)
Facility: HOSPITAL | Age: 78
DRG: 574 | End: 2023-10-28
Payer: MEDICARE

## 2023-10-28 DIAGNOSIS — I50.9 CONGESTIVE HEART FAILURE, UNSPECIFIED HF CHRONICITY, UNSPECIFIED HEART FAILURE TYPE (HCC): Primary | ICD-10-CM

## 2023-10-28 DIAGNOSIS — L03.116 CELLULITIS OF LEFT LOWER EXTREMITY: ICD-10-CM

## 2023-10-28 PROBLEM — L03.90 CELLULITIS: Status: ACTIVE | Noted: 2023-10-28

## 2023-10-28 LAB
ALBUMIN SERPL-MCNC: 3 G/DL (ref 3.5–5)
ALBUMIN/GLOB SERPL: 0.6 (ref 1.1–2.2)
ALP SERPL-CCNC: 226 U/L (ref 45–117)
ALT SERPL-CCNC: 64 U/L (ref 12–78)
ANION GAP SERPL CALC-SCNC: 11 MMOL/L (ref 5–15)
AST SERPL-CCNC: 108 U/L (ref 15–37)
BASOPHILS # BLD: 0 K/UL (ref 0–0.1)
BASOPHILS NFR BLD: 0 % (ref 0–1)
BILIRUB SERPL-MCNC: 0.8 MG/DL (ref 0.2–1)
BUN SERPL-MCNC: 34 MG/DL (ref 6–20)
BUN/CREAT SERPL: 15 (ref 12–20)
CALCIUM SERPL-MCNC: 9.2 MG/DL (ref 8.5–10.1)
CHLORIDE SERPL-SCNC: 92 MMOL/L (ref 97–108)
CO2 SERPL-SCNC: 25 MMOL/L (ref 21–32)
CREAT SERPL-MCNC: 2.28 MG/DL (ref 0.55–1.02)
CRP SERPL-MCNC: 5.08 MG/DL (ref 0–0.6)
DIFFERENTIAL METHOD BLD: ABNORMAL
EOSINOPHIL # BLD: 0 K/UL (ref 0–0.4)
EOSINOPHIL NFR BLD: 0 % (ref 0–7)
ERYTHROCYTE [DISTWIDTH] IN BLOOD BY AUTOMATED COUNT: 14.6 % (ref 11.5–14.5)
ERYTHROCYTE [SEDIMENTATION RATE] IN BLOOD: >140 MM/HR (ref 0–30)
GLOBULIN SER CALC-MCNC: 4.8 G/DL (ref 2–4)
GLUCOSE SERPL-MCNC: 92 MG/DL (ref 65–100)
HCT VFR BLD AUTO: 29.9 % (ref 35–47)
HGB BLD-MCNC: 10 G/DL (ref 11.5–16)
IMM GRANULOCYTES # BLD AUTO: 0.1 K/UL (ref 0–0.04)
IMM GRANULOCYTES NFR BLD AUTO: 2 % (ref 0–0.5)
IRON SATN MFR SERPL: 24 % (ref 20–50)
IRON SERPL-MCNC: 49 UG/DL (ref 35–150)
LACTATE SERPL-SCNC: 1 MMOL/L (ref 0.4–2)
LYMPHOCYTES # BLD: 0.6 K/UL (ref 0.8–3.5)
LYMPHOCYTES NFR BLD: 13 % (ref 12–49)
MCH RBC QN AUTO: 31.2 PG (ref 26–34)
MCHC RBC AUTO-ENTMCNC: 33.4 G/DL (ref 30–36.5)
MCV RBC AUTO: 93.1 FL (ref 80–99)
MONOCYTES # BLD: 0.4 K/UL (ref 0–1)
MONOCYTES NFR BLD: 8 % (ref 5–13)
NEUTS SEG # BLD: 3.9 K/UL (ref 1.8–8)
NEUTS SEG NFR BLD: 77 % (ref 32–75)
NRBC # BLD: 0 K/UL (ref 0–0.01)
NRBC BLD-RTO: 0 PER 100 WBC
NT PRO BNP: 1254 PG/ML (ref 0–450)
PLATELET # BLD AUTO: 352 K/UL (ref 150–400)
PMV BLD AUTO: 9.8 FL (ref 8.9–12.9)
POTASSIUM SERPL-SCNC: 4.4 MMOL/L (ref 3.5–5.1)
PROT SERPL-MCNC: 7.8 G/DL (ref 6.4–8.2)
RBC # BLD AUTO: 3.21 M/UL (ref 3.8–5.2)
SODIUM SERPL-SCNC: 128 MMOL/L (ref 136–145)
TIBC SERPL-MCNC: 203 UG/DL (ref 250–450)
WBC # BLD AUTO: 5 K/UL (ref 3.6–11)

## 2023-10-28 PROCEDURE — 83605 ASSAY OF LACTIC ACID: CPT

## 2023-10-28 PROCEDURE — 83540 ASSAY OF IRON: CPT

## 2023-10-28 PROCEDURE — 6370000000 HC RX 637 (ALT 250 FOR IP): Performed by: INTERNAL MEDICINE

## 2023-10-28 PROCEDURE — 86140 C-REACTIVE PROTEIN: CPT

## 2023-10-28 PROCEDURE — 2580000003 HC RX 258: Performed by: INTERNAL MEDICINE

## 2023-10-28 PROCEDURE — 36415 COLL VENOUS BLD VENIPUNCTURE: CPT

## 2023-10-28 PROCEDURE — 85025 COMPLETE CBC W/AUTO DIFF WBC: CPT

## 2023-10-28 PROCEDURE — 80053 COMPREHEN METABOLIC PANEL: CPT

## 2023-10-28 PROCEDURE — 87040 BLOOD CULTURE FOR BACTERIA: CPT

## 2023-10-28 PROCEDURE — 6360000002 HC RX W HCPCS: Performed by: INTERNAL MEDICINE

## 2023-10-28 PROCEDURE — 85652 RBC SED RATE AUTOMATED: CPT

## 2023-10-28 PROCEDURE — 1100000000 HC RM PRIVATE

## 2023-10-28 PROCEDURE — 99285 EMERGENCY DEPT VISIT HI MDM: CPT

## 2023-10-28 PROCEDURE — 73700 CT LOWER EXTREMITY W/O DYE: CPT

## 2023-10-28 PROCEDURE — 2580000003 HC RX 258: Performed by: STUDENT IN AN ORGANIZED HEALTH CARE EDUCATION/TRAINING PROGRAM

## 2023-10-28 PROCEDURE — 83550 IRON BINDING TEST: CPT

## 2023-10-28 PROCEDURE — 6360000002 HC RX W HCPCS: Performed by: STUDENT IN AN ORGANIZED HEALTH CARE EDUCATION/TRAINING PROGRAM

## 2023-10-28 PROCEDURE — 83880 ASSAY OF NATRIURETIC PEPTIDE: CPT

## 2023-10-28 RX ORDER — ACETAMINOPHEN 325 MG/1
650 TABLET ORAL EVERY 6 HOURS PRN
Status: DISCONTINUED | OUTPATIENT
Start: 2023-10-28 | End: 2023-10-28 | Stop reason: SDUPTHER

## 2023-10-28 RX ORDER — LEVOTHYROXINE SODIUM 0.05 MG/1
50 TABLET ORAL DAILY
Status: DISCONTINUED | OUTPATIENT
Start: 2023-10-29 | End: 2023-11-01

## 2023-10-28 RX ORDER — LEVOTHYROXINE SODIUM 0.05 MG/1
50 TABLET ORAL DAILY
Status: DISCONTINUED | OUTPATIENT
Start: 2023-10-28 | End: 2023-10-28 | Stop reason: SDUPTHER

## 2023-10-28 RX ORDER — ONDANSETRON 4 MG/1
4 TABLET, ORALLY DISINTEGRATING ORAL EVERY 8 HOURS PRN
Status: DISCONTINUED | OUTPATIENT
Start: 2023-10-28 | End: 2023-11-04 | Stop reason: HOSPADM

## 2023-10-28 RX ORDER — ATORVASTATIN CALCIUM 40 MG/1
80 TABLET, FILM COATED ORAL NIGHTLY
Status: DISCONTINUED | OUTPATIENT
Start: 2023-10-28 | End: 2023-11-04 | Stop reason: HOSPADM

## 2023-10-28 RX ORDER — LEVOTHYROXINE SODIUM 0.05 MG/1
50 TABLET ORAL DAILY
Status: DISCONTINUED | OUTPATIENT
Start: 2023-10-28 | End: 2023-10-28

## 2023-10-28 RX ORDER — ACETAMINOPHEN 650 MG/1
650 SUPPOSITORY RECTAL EVERY 6 HOURS PRN
Status: DISCONTINUED | OUTPATIENT
Start: 2023-10-28 | End: 2023-11-04 | Stop reason: HOSPADM

## 2023-10-28 RX ORDER — SPIRONOLACTONE 25 MG/1
25 TABLET ORAL DAILY
Status: DISCONTINUED | OUTPATIENT
Start: 2023-10-28 | End: 2023-10-28

## 2023-10-28 RX ORDER — AMIODARONE HYDROCHLORIDE 200 MG/1
200 TABLET ORAL DAILY
Status: DISCONTINUED | OUTPATIENT
Start: 2023-10-29 | End: 2023-11-04 | Stop reason: HOSPADM

## 2023-10-28 RX ORDER — SODIUM CHLORIDE 0.9 % (FLUSH) 0.9 %
5-40 SYRINGE (ML) INJECTION EVERY 12 HOURS SCHEDULED
Status: DISCONTINUED | OUTPATIENT
Start: 2023-10-28 | End: 2023-11-04 | Stop reason: HOSPADM

## 2023-10-28 RX ORDER — FUROSEMIDE 10 MG/ML
40 INJECTION INTRAMUSCULAR; INTRAVENOUS ONCE
Status: DISCONTINUED | OUTPATIENT
Start: 2023-10-28 | End: 2023-10-28

## 2023-10-28 RX ORDER — ASPIRIN 81 MG/1
81 TABLET, CHEWABLE ORAL DAILY
Status: DISCONTINUED | OUTPATIENT
Start: 2023-10-28 | End: 2023-11-04 | Stop reason: HOSPADM

## 2023-10-28 RX ORDER — ACETAMINOPHEN 325 MG/1
650 TABLET ORAL EVERY 6 HOURS PRN
Status: DISCONTINUED | OUTPATIENT
Start: 2023-10-28 | End: 2023-11-04 | Stop reason: HOSPADM

## 2023-10-28 RX ORDER — SODIUM CHLORIDE 0.9 % (FLUSH) 0.9 %
5-40 SYRINGE (ML) INJECTION PRN
Status: DISCONTINUED | OUTPATIENT
Start: 2023-10-28 | End: 2023-11-04 | Stop reason: HOSPADM

## 2023-10-28 RX ORDER — ENOXAPARIN SODIUM 100 MG/ML
30 INJECTION SUBCUTANEOUS DAILY
Status: DISCONTINUED | OUTPATIENT
Start: 2023-10-28 | End: 2023-10-30

## 2023-10-28 RX ORDER — SODIUM CHLORIDE 9 MG/ML
INJECTION, SOLUTION INTRAVENOUS PRN
Status: DISCONTINUED | OUTPATIENT
Start: 2023-10-28 | End: 2023-11-04 | Stop reason: HOSPADM

## 2023-10-28 RX ORDER — POLYETHYLENE GLYCOL 3350 17 G/17G
17 POWDER, FOR SOLUTION ORAL DAILY PRN
Status: DISCONTINUED | OUTPATIENT
Start: 2023-10-28 | End: 2023-11-04 | Stop reason: HOSPADM

## 2023-10-28 RX ORDER — ONDANSETRON 2 MG/ML
4 INJECTION INTRAMUSCULAR; INTRAVENOUS EVERY 6 HOURS PRN
Status: DISCONTINUED | OUTPATIENT
Start: 2023-10-28 | End: 2023-11-04 | Stop reason: HOSPADM

## 2023-10-28 RX ADMIN — ATORVASTATIN CALCIUM 80 MG: 40 TABLET, FILM COATED ORAL at 22:19

## 2023-10-28 RX ADMIN — PIPERACILLIN AND TAZOBACTAM 4500 MG: 4; .5 INJECTION, POWDER, LYOPHILIZED, FOR SOLUTION INTRAVENOUS at 17:00

## 2023-10-28 RX ADMIN — SODIUM CHLORIDE, PRESERVATIVE FREE 10 ML: 5 INJECTION INTRAVENOUS at 20:55

## 2023-10-28 RX ADMIN — VANCOMYCIN HYDROCHLORIDE 2250 MG: 1 INJECTION, POWDER, LYOPHILIZED, FOR SOLUTION INTRAVENOUS at 17:18

## 2023-10-28 RX ADMIN — ENOXAPARIN SODIUM 30 MG: 100 INJECTION SUBCUTANEOUS at 19:48

## 2023-10-28 RX ADMIN — ACETAMINOPHEN 650 MG: 325 TABLET ORAL at 22:12

## 2023-10-28 RX ADMIN — ASPIRIN 81 MG 81 MG: 81 TABLET ORAL at 19:39

## 2023-10-28 ASSESSMENT — PAIN DESCRIPTION - LOCATION: LOCATION: LEG

## 2023-10-28 ASSESSMENT — LIFESTYLE VARIABLES
HOW MANY STANDARD DRINKS CONTAINING ALCOHOL DO YOU HAVE ON A TYPICAL DAY: PATIENT DOES NOT DRINK
HOW OFTEN DO YOU HAVE A DRINK CONTAINING ALCOHOL: NEVER

## 2023-10-28 ASSESSMENT — PAIN SCALES - GENERAL: PAINLEVEL_OUTOF10: 5

## 2023-10-28 NOTE — H&P
History and Physical    Date of Service:  10/28/2023  Primary Care Provider: Vikram Luo MD  Source of information: chart review, patient    Chief Complaint: Wound Infection      History of Presenting Illness:   Caroline Santos is a 69 y.o. female who presents with with past medical history of CKD, heart failure, CAD presents to the ED department due to left lower extremity wound. Patient originally went to [facility] due to lower extremity swelling and a cut in her left foot. Her left foot was wrapped in gauze and she was discharged on 10/24/2023. She endorsed that wound care nurse was supposed to come each day and unwrap the wound but they never arrived. Patient lives in a senior independent facility and was supposed to receive home health care twice a week as well. On 10/28/2023 patient's foot was unwrapped and it was noted to be draining purulent fluid with the addition of maggots. She did arrive to ED nontoxic afebrile, and hemodynamically stable. On exam patient's lower extremities were pitting +3 however patient was not in acute exacerbation . Significant lab results include WBC within normal limits, , creatinine 2.28, BUN 3 4, , . Patient admitted for wound care and parenteral antibiotic therapy for the treatment of cellulitis. -order esr, crp    The patient denies any headache, blurry vision, sore throat, trouble swallowing, trouble with speech, chest pain, SOB, cough, fever, chills, N/V/D, abd pain, urinary symptoms, constipation, recent travels, sick contacts, focal or generalized neurological symptoms, falls, injuries, rashes, contact with COVID-19 diagnosed patients, hematemesis, melena, hemoptysis, hematuria, rashes, denies starting any new medications and denies any other concerns or problems besides as mentioned above. REVIEW OF SYSTEMS:  CONSTITUTIONAL:  No fever. No chills. CARDIOVASCULAR:  No chest pain. No palpitations.  No lower extremity components:       Result Value    RBC 3.21 (*)     Hemoglobin 10.0 (*)     Hematocrit 29.9 (*)     RDW 14.6 (*)     Neutrophils % 77 (*)     Immature Granulocytes 2 (*)     Lymphocytes Absolute 0.6 (*)     Absolute Immature Granulocyte 0.1 (*)     All other components within normal limits   COMPREHENSIVE METABOLIC PANEL - Abnormal; Notable for the following components:    Sodium 128 (*)     Chloride 92 (*)     BUN 34 (*)     Creatinine 2.28 (*)     Est, Glom Filt Rate 22 (*)      (*)     Alk Phosphatase 226 (*)     Albumin 3.0 (*)     Globulin 4.8 (*)     Albumin/Globulin Ratio 0.6 (*)     All other components within normal limits       [unfilled]    IMAGING:   CT FOOT LEFT WO CONTRAST   Final Result   Significant subcutaneous edema without evidence of a focal drainable fluid   collection. ECG/ECHO:  [unfilled]       Notes reviewed from all clinical/nonclinical/nursing services involved in patient's clinical care. Care coordination discussions were held with appropriate clinical/nonclinical/ nursing providers based on care coordination needs. Assessment and Plan :   Given the patient's current clinical presentation, there is a high level of concern for decompensation if discharged from the emergency department. Complex decision making was performed, which includes reviewing the patient's available past medical records, laboratory results, and imaging studies. Principal Problem:    Cellulitis  Resolved Problems:    * No resolved hospital problems.  *      #Cellulitis  -wound unstagable, purulent, maggots  -vancomycin IV  -adding cefepime for pseudomonal coverage as patient's wound is deeply infected and patient recently received antibiotics at a healthcare facility recently   -wound care nurse consult    #HFpEF  -no exacerbation  -ECHO 10/23:EF 55-60% 10/23 with aortic regurg  -NYHA 3  -decompensated, lower ext edema +3  -BNP ordered  -fluid restriction, strict I&O  -goal K>4,

## 2023-10-28 NOTE — ED NOTES
Rn attempted to get blood samples from Pt. RN unable to draw blood from Pt. RN colleague Gerry Clayton, MARILYN will attempt to inset IV line and draw blood for cultures from Pt.       Lily Kerr RN  10/28/23 0834

## 2023-10-29 LAB
ANION GAP SERPL CALC-SCNC: 8 MMOL/L (ref 5–15)
BUN SERPL-MCNC: 27 MG/DL (ref 6–20)
BUN/CREAT SERPL: 16 (ref 12–20)
CALCIUM SERPL-MCNC: 8.5 MG/DL (ref 8.5–10.1)
CHLORIDE SERPL-SCNC: 99 MMOL/L (ref 97–108)
CHOLEST SERPL-MCNC: 146 MG/DL
CO2 SERPL-SCNC: 27 MMOL/L (ref 21–32)
CREAT SERPL-MCNC: 1.67 MG/DL (ref 0.55–1.02)
GLUCOSE SERPL-MCNC: 83 MG/DL (ref 65–100)
HDLC SERPL-MCNC: 42 MG/DL
HDLC SERPL: 3.5 (ref 0–5)
LDLC SERPL CALC-MCNC: 87.2 MG/DL (ref 0–100)
MAGNESIUM SERPL-MCNC: 2 MG/DL (ref 1.6–2.4)
POTASSIUM SERPL-SCNC: 3.9 MMOL/L (ref 3.5–5.1)
SODIUM SERPL-SCNC: 134 MMOL/L (ref 136–145)
TRIGL SERPL-MCNC: 84 MG/DL
TSH SERPL DL<=0.05 MIU/L-ACNC: 4.37 UIU/ML (ref 0.36–3.74)
VLDLC SERPL CALC-MCNC: 16.8 MG/DL

## 2023-10-29 PROCEDURE — 2580000003 HC RX 258: Performed by: INTERNAL MEDICINE

## 2023-10-29 PROCEDURE — 83735 ASSAY OF MAGNESIUM: CPT

## 2023-10-29 PROCEDURE — 2700000000 HC OXYGEN THERAPY PER DAY

## 2023-10-29 PROCEDURE — 36415 COLL VENOUS BLD VENIPUNCTURE: CPT

## 2023-10-29 PROCEDURE — 80048 BASIC METABOLIC PNL TOTAL CA: CPT

## 2023-10-29 PROCEDURE — 80061 LIPID PANEL: CPT

## 2023-10-29 PROCEDURE — 6370000000 HC RX 637 (ALT 250 FOR IP): Performed by: INTERNAL MEDICINE

## 2023-10-29 PROCEDURE — 6360000002 HC RX W HCPCS: Performed by: INTERNAL MEDICINE

## 2023-10-29 PROCEDURE — 84443 ASSAY THYROID STIM HORMONE: CPT

## 2023-10-29 PROCEDURE — 1200000000 HC SEMI PRIVATE

## 2023-10-29 RX ORDER — TRAMADOL HYDROCHLORIDE 50 MG/1
100 TABLET ORAL 2 TIMES DAILY PRN
Status: ON HOLD | COMMUNITY
End: 2023-11-04 | Stop reason: HOSPADM

## 2023-10-29 RX ORDER — MULTIVITAMIN
1 TABLET ORAL DAILY
COMMUNITY

## 2023-10-29 RX ORDER — LEVOTHYROXINE SODIUM 0.05 MG/1
50 TABLET ORAL DAILY
Status: ON HOLD | COMMUNITY
Start: 2023-08-22 | End: 2023-11-04 | Stop reason: HOSPADM

## 2023-10-29 RX ORDER — FUROSEMIDE 10 MG/ML
20 INJECTION INTRAMUSCULAR; INTRAVENOUS ONCE
Status: COMPLETED | OUTPATIENT
Start: 2023-10-29 | End: 2023-10-29

## 2023-10-29 RX ORDER — ACETAMINOPHEN 500 MG
TABLET ORAL
COMMUNITY
Start: 2022-11-17

## 2023-10-29 RX ORDER — AMIODARONE HYDROCHLORIDE 200 MG/1
200 TABLET ORAL DAILY
Qty: 30 TABLET | Refills: 11 | COMMUNITY
Start: 2023-02-07 | End: 2024-02-07

## 2023-10-29 RX ADMIN — ASPIRIN 81 MG 81 MG: 81 TABLET ORAL at 09:14

## 2023-10-29 RX ADMIN — ACETAMINOPHEN 650 MG: 325 TABLET ORAL at 11:31

## 2023-10-29 RX ADMIN — ACETAMINOPHEN 650 MG: 325 TABLET ORAL at 19:20

## 2023-10-29 RX ADMIN — LEVOTHYROXINE SODIUM 50 MCG: 0.05 TABLET ORAL at 06:21

## 2023-10-29 RX ADMIN — SODIUM CHLORIDE, PRESERVATIVE FREE 20 ML: 5 INJECTION INTRAVENOUS at 20:21

## 2023-10-29 RX ADMIN — VANCOMYCIN HYDROCHLORIDE 1000 MG: 1 INJECTION, POWDER, LYOPHILIZED, FOR SOLUTION INTRAVENOUS at 17:33

## 2023-10-29 RX ADMIN — ATORVASTATIN CALCIUM 80 MG: 40 TABLET, FILM COATED ORAL at 20:20

## 2023-10-29 RX ADMIN — CEFEPIME 2000 MG: 2 INJECTION, POWDER, FOR SOLUTION INTRAVENOUS at 23:17

## 2023-10-29 RX ADMIN — FUROSEMIDE 20 MG: 10 INJECTION, SOLUTION INTRAMUSCULAR; INTRAVENOUS at 09:14

## 2023-10-29 RX ADMIN — CEFEPIME 2000 MG: 2 INJECTION, POWDER, FOR SOLUTION INTRAVENOUS at 01:13

## 2023-10-29 RX ADMIN — ENOXAPARIN SODIUM 30 MG: 100 INJECTION SUBCUTANEOUS at 20:20

## 2023-10-29 RX ADMIN — AMIODARONE HYDROCHLORIDE 200 MG: 200 TABLET ORAL at 09:14

## 2023-10-29 RX ADMIN — EMPAGLIFLOZIN 10 MG: 10 TABLET, FILM COATED ORAL at 09:14

## 2023-10-29 ASSESSMENT — PAIN DESCRIPTION - ORIENTATION
ORIENTATION: RIGHT
ORIENTATION: RIGHT

## 2023-10-29 ASSESSMENT — PAIN SCALES - GENERAL
PAINLEVEL_OUTOF10: 0
PAINLEVEL_OUTOF10: 5
PAINLEVEL_OUTOF10: 0
PAINLEVEL_OUTOF10: 4

## 2023-10-29 ASSESSMENT — PAIN DESCRIPTION - LOCATION
LOCATION: LEG
LOCATION: HIP;LEG

## 2023-10-29 ASSESSMENT — PAIN DESCRIPTION - DESCRIPTORS: DESCRIPTORS: ACHING

## 2023-10-29 ASSESSMENT — PAIN DESCRIPTION - PAIN TYPE: TYPE: CHRONIC PAIN

## 2023-10-29 NOTE — PROGRESS NOTES
TRANSFER - IN REPORT:    Verbal report received from Mary Iraheta RN on Camelia Hook  being received from ED for routine progression of patient care      Report consisted of patient's Situation, Background, Assessment and   Recommendations(SBAR). Information from the following report(s) Nurse Handoff Report, Index, Surgery Report, MAR, and Recent Results was reviewed with the receiving nurse. Opportunity for questions and clarification was provided. Assessment completed upon patient's arrival to unit and care assumed.

## 2023-10-29 NOTE — ED NOTES
TRANSFER - OUT REPORT:    Verbal report given to [facility] on Bandar Palumbo  being transferred to [facility] for routine progression of patient care       Report consisted of patient's Situation, Background, Assessment and   Recommendations(SBAR). Information from the following report(s) ED SBAR was reviewed with the receiving nurse. Tay Fall Assessment:    Presents to emergency department  because of falls (Syncope, seizure, or loss of consciousness): No  Age > 70: Yes  Altered Mental Status, Intoxication with alcohol or substance confusion (Disorientation, impaired judgment, poor safety awaremess, or inability to follow instructions): No  Impaired Mobility: Ambulates or transfers with assistive devices or assistance; Unable to ambulate or transer.: Yes  Nursing Judgement: Yes          Lines:   Peripheral IV 10/28/23 Right Antecubital (Active)   Site Assessment Clean, dry & intact 10/28/23 1659   Line Status Blood return noted;Normal saline locked;Specimen collected 10/28/23 1659   Phlebitis Assessment No symptoms 10/28/23 1659   Infiltration Assessment 0 10/28/23 1659   Dressing Status New dressing applied 10/28/23 1659   Dressing Type Transparent 10/28/23 1659   Dressing Intervention New 10/28/23 1659       Peripheral IV 10/28/23 Left Antecubital (Active)        Opportunity for questions and clarification was provided.       Patient transported with:  Registered Nurse          Munira Mccartney RN  10/29/23 [id number]

## 2023-10-29 NOTE — PROGRESS NOTES
Diet education complete. Pt asked good questions and we reviewed the handouts together. Thank you for the consult.   [place] PhD, [id number] [facility], [address] [address], [address]

## 2023-10-29 NOTE — PROGRESS NOTES
[address] [facility]  Hospitalist Group                                                                                          Hospitalist Progress Note  Ricardo Caba MD  Office Phone: [phone]        Date of Service:  10/29/2023  NAME:  Latoya Mireles  :    MRN:  [id number]       Admission Summary:   Latoya Mireles is a 69 y.o. female who presents with with past medical history of CKD, heart failure, CAD presents to the ED department due to left lower extremity wound. Patient originally went to [facility] due to lower extremity swelling and a cut in her left foot. Her left foot was wrapped in gauze and she was discharged on 10/24/2023. She endorsed that wound care nurse was supposed to come each day and unwrap the wound but they never arrived. Patient lives in a senior independent facility and was supposed to receive home health care twice a week as well. On 10/28/2023 patient's foot was unwrapped and it was noted to be draining purulent fluid with the addition of maggots. She did arrive to ED nontoxic afebrile, and hemodynamically stable. On exam patient's lower extremities were pitting +3 however patient was not in acute exacerbation . Significant lab results include WBC within normal limits, , creatinine 2.28, BUN 3 4, , . Patient admitted for wound care and parenteral antibiotic therapy for the treatment of cellulitis.        Interval history / Subjective:   -Patient endorses she voided large amounts of urine, no data listed on the I/O in epic  -states she's breathing better, BP stayed relatively the same with low dose lasix     Assessment & Plan:         #Cellulitis  -wound unstagable, purulent, maggots  -vancomycin IV  -adding cefepime for pseudomonal coverage as patient's wound is deeply infected and patient recently received antibiotics at a healthcare facility recently   -wound care nurse consult     #HFpEF  -no exacerbation  -ECHO 10/23:EF 55-60% 10/23 with aortic regurg  -NYHA 3  -decompensated, lower ext edema +3  -BNP ordered  -fluid restriction, strict I&O  -goal K>4, Mg>2,  -[facility] held diuretic due to episodes of hypotension, discharged without diuretic  -started low dose lasix  -asa/statin  -SGLT2 inhibitor due to preserved ejection, holding spironlactone     #Moderate to severe AI   -[facility] requested CT surgery for follow up     #Moderate hyponatremia, stable  -likely hypotonic hyponatremia due to volume overload,   -per [facility] baseline Na is 128 (which remained the same with or without diuretics or IVF)     Nondisplaced Intra-articular fracture of the left posterior medial tibia  -Found on CT left LE 10/17/2023. -[facility] Orthopedic surgery: weightbearing is fine on left leg and no surgical intervention for now'  -[facility] PT/OT recs SNF, patient declined      Hx of Vtach, 1st deg AVB  Has ICD placed.   -Continue with home amiodarone     HTN  -prior hx of hypotension in [facility]  - continue metoprolol      HLD  -continue atorvastatin at 80 mg daily        Code status:   Prophylaxis:   Care Plan discussed with:   Anticipated Disposition:   Inpatient  Cardiac monitoring: None         Social Determinants of Health     Tobacco Use: Medium Risk (10/29/2023)    Patient History     Smoking Tobacco Use: Former     Smokeless Tobacco Use: Never     Passive Exposure: Not on file   Alcohol Use: Not At Risk (10/28/2023)    AUDIT-C     Frequency of Alcohol Consumption: Never     Average Number of Drinks: Patient does not drink     Frequency of Binge Drinking: Never   Financial Resource Strain: Not on file   Food Insecurity: Not on file   Transportation Needs: Not on file   Physical Activity: Not on file   Stress: Not on file   Social Connections: Not on file   Intimate Partner Violence: Not on file   Depression: Not on file   Housing Stability: Not on file       Review of Systems:   CONSTITUTIONAL:  No fever. No chills. CARDIOVASCULAR:  No chest pain. No palpitations.  No lower extremity edema. RESPIRATORY: No shortness of breath, cough, pain with respiration, or pleuritic chest pain. GASTROINTESTINAL:  Normal appetite. No nausea, vomiting, diarrhea. No pain. No bloating. No melena. GENITOURINARY:  No frequency, urgency, nocturia. No hematuria or dysuria. INTEGUMENTARY:  wound in left lower leg  NEUROLOGIC:  No headache. No neck pain. No numbness or tingling of the extremities. No weakness. PSYCHIATRIC:  No confusion. ENDOCRINE:  No fatigue. No weakness. No history of thyroid, diabetes or adrenal problems. HEMATOLOGICAL:  No bleeding. ALLERGIES:  No asthma. No urticaria. Vital Signs:    Last 24hrs VS reviewed since prior progress note.  Most recent are:  Vitals:    10/29/23 1759   BP: (!) 132/51   Pulse: 77   Resp: 13   Temp:    SpO2: 91%         Intake/Output Summary (Last 24 hours) at 10/29/2023 1822  Last data filed at 10/29/2023 1310  Gross per 24 hour   Intake 472 ml   Output --   Net 472 ml        Physical Examination:     I had a face to face encounter with this patient and independently examined them on 10/29/2023 as outlined below:          General : alert x 3, awake, no acute distress,   HEENT: PEERL, EOMI, moist mucus membrane, TM clear  Neck: supple, no JVD, no meningeal signs  Chest: Clear to auscultation bilaterally   CVS: S1 S2 heard, Capillary refill less than 2 seconds  Abd: soft/ non tender, non distended, BS physiological,   Ext: lower ext edema, left foot has a 4x4 cm wound unstagable  Neuro/Psych: pleasant mood and affect, CN 2-12 grossly intact, sensory grossly within normal limit,   Skin: warm     Data Review:    Review and/or order of clinical lab test  Review and/or order of tests in the radiology section of CPT  Review and/or order of tests in the medicine section of CPT  I personally reviewed  Image and EKG/Monitor Tracing      I have personally and independently reviewed all pertinent labs, diagnostic studies, imaging, and have provided independent

## 2023-10-29 NOTE — PROGRESS NOTES
Comprehensive Nutrition Assessment    Type and Reason for Visit:  Initial, Wound, Consult    Nutrition Recommendations/Plan:   Diet as tolerated  Supplements for wound healing  Per consult, education to be completed later this morning. Nutrition Assessment:    Pt to be admitted for pain/swelling LLE wound/cellulitis. Hx notable for CAD, CKD-3, heart failure, HTN, NSTEMI (2019), obesity, HLD, Vit B12 def, Vit D def, hypothyroidism, gout, constipation. Nutrition Related Findings:    Pt tolerating diet. Meds include lipitor, jardiance, synthroid, vanc, amiodarone, glycolax Wound Type: Deep Tissue Injury (cellulitis)       Current Nutrition Intake & Therapies:    Average Meal Intake: %  Average Supplements Intake: None Ordered  ADULT DIET; Regular; Low Potassium (Less than 3000 mg/day); Low Phosphorus (Less than 1000 mg); 1200 ml    Anthropometric Measures:  Height: 162.6 cm (5' 4\")  Ideal Body Weight (IBW): 120 lbs (55 kg)       Current Body Weight: 84 kg (185 lb 3.2 oz), 154.3 % IBW. Weight Source: Bed Scale  Current BMI (kg/m2): 31.8        Weight Adjustment For:  (ABW 69.3 kg)                 BMI Categories: Obese Class 1 (BMI 30.0-34. 9)    Estimated Daily Nutrient Needs:  Energy Requirements Based On: Formula  Weight Used for Energy Requirements: Adjusted  Energy (kcal/day): 1800  Weight Used for Protein Requirements: Adjusted  Protein (g/day): 76  Method Used for Fluid Requirements: 1 ml/kcal  Fluid (ml/day): 1800    Nutrition Diagnosis:   Overweight/Obese, Limited adherence to nutrition-related recommendations related to food and nutrition related knowledge deficit, excessive energy intake as evidenced by      Nutrition Interventions:   Food and/or Nutrient Delivery: Continue Current Diet, Start Oral Nutrition Supplement  Nutrition Education/Counseling: Education initiated  Coordination of Nutrition Care: Continue to monitor while inpatient       Goals:  Previous Goal Met: Progressing toward

## 2023-10-29 NOTE — ED NOTES
Hourly rounding completed on this pt. Offered assistance for toileting or hygiene at this time. Provided opportunity for snack nourishment or PO fluid hydration. Pt is up-to-date on plan of care. No pain interventions required at this time. Warm blanket offered, call bell within reach, safety precautions in place, bed locked and in the lowest position.        Jad Blanchard RN  10/29/23 8215

## 2023-10-29 NOTE — PROGRESS NOTES
[facility] Medication Reconciliation    Information obtained from:  Patient interview, Rx Query, patient's prescription bottles, chart review of discharge summary from [address] [place] [address] on 10/24/23  RxQuery data available1: yes    Comments/recommendations:    1) The patient was interviewed regarding current PTA medication list, use and drug allergies. Patient had discharge summary dated 10/24/23 from [address] [place] [address] encounter. PTA med list updated to reflect this. 2) Medication changes to PTA list:    Added  Acetaminophen 500 mg tabs  Amiodarone 200 mg  Pepto Bismol 15 mLs prn  Diclofenac 1% gel  Metoprolol 25 mg tabs - one-half tablet (12.5 mg) po BID  Multivitamin daily  Tramadol 50 mg  Removed  Allopurinol 100 mg  Atorvastatin 80 mg  Clopidogrel 75 mg  Cycanocobalamin 1000 mcg  Ergocalciferol 1.25 mg  Furosemide 20 mg  Magnesium oxide 400 mg  Famotidine 20 mg - 40 mg  Spironolactone 25 mg  Pantoprazole 40 mg  Meloxicam 7.5 mg  Lisinopril 5 mg  Carvedilol 3.125 mg  Adjusted  Levothyroxine from 25 mcg to 50 mcg po daily    3) The [place] Prescription Monitoring Program () was accessed to determine fill history of any controlled medications:  Recurring tramadol 50 mg - last filled 8/8/23 #100     1RxQuery pharmacy benefit data reflects medications filled and processed through the patient's insurance, however                this data does NOT capture whether the medication was picked up or is currently being taken by the patient. Past Medical History/Disease States:  Past Medical History:   Diagnosis Date    Arthritis     CAD (coronary artery disease)     CKD (chronic kidney disease) stage 3, GFR 30-59 ml/min ([facility]) 12/24/2019    Heart failure ([facility])     Hypertension     NSTEMI (non-ST elevated myocardial infarction) ([address]) 12/24/2019    Ventricular fibrillation ([address]) 12/24/2019    AICD fired due to Vfib    Ventricular tachycardia ([address]) 12/24/2019         Patient allergies:    Allergies as of 10/28/2023    (No Known Allergies)         Prior to Admission Medications   Prescriptions Last Dose Informant Patient Reported? Taking? DULoxetine (CYMBALTA) 30 MG extended release capsule 10/28/2023 Self Yes Yes   Sig: Take 1 capsule by mouth daily   Multiple Vitamin (DAILY VITES) TABS 10/28/2023 Self Yes Yes   Sig: Take 1 tablet by mouth daily   acetaminophen (TYLENOL) 500 MG tablet  Self Yes Yes   Sig: TAKE 2 TABLETS BY MOUTH EVERY 6 HOURS AS NEEDED FOR MILD PAIN.   amiodarone (CORDARONE) 200 MG tablet 10/28/2023 Self Yes Yes   Sig: Take 1 tablet by mouth daily   aspirin 81 MG EC tablet 10/28/2023 Self Yes Yes   Sig: Take 1 tablet by mouth daily   bismuth subsalicylate (PEPTO BISMOL) 262 MG/15ML suspension Past Week Self Yes Yes   Sig: Take 15 mLs by mouth every 6 hours as needed   diclofenac sodium (VOLTAREN) 1 % GEL Past Week Self Yes Yes   Sig: Apply 2 g topically 4 times daily   fluticasone (FLONASE) 50 MCG/ACT nasal spray Past Month Self Yes Yes   Si sprays by Nasal route daily   levothyroxine (SYNTHROID) 50 MCG tablet 10/28/2023 Self Yes Yes   Sig: Take 1 tablet by mouth daily   metoprolol tartrate (LOPRESSOR) 25 MG tablet 10/28/2023 Self Yes Yes   Sig: Take 0.5 tablets by mouth 2 times daily   traMADol (ULTRAM) 50 MG tablet Past Week Self Yes Yes   Sig: Take 2 tablets by mouth 2 times daily as needed.              Thank you,  Yenifer Bryson [facility]

## 2023-10-29 NOTE — ED NOTES
Hourly rounding completed on this pt. Offered assistance for toileting or hygiene at this time. Provided opportunity for snack nourishment or PO fluid hydration. Pt is up-to-date on plan of care. No pain interventions required at this time. Warm blanket offered, call bell within reach, safety precautions in place, bed locked and in the lowest position.        Jad Blanchard RN  10/29/23 9668

## 2023-10-29 NOTE — ED NOTES
Hourly rounding completed on this pt. Offered assistance for toileting or hygiene at this time. Provided opportunity for snack nourishment or PO fluid hydration. Pt is up-to-date on plan of care. No pain interventions required at this time. Warm blanket offered, call bell within reach, safety precautions in place, bed locked and in the lowest position.        Marilu Taveras RN  10/29/23 0710

## 2023-10-29 NOTE — ED NOTES
Pt given commode, 2 assist needed, pt repositioned in the stretcher and given a call reeder.       Ruma Ulloa RN  10/29/23 8177

## 2023-10-29 NOTE — ED NOTES
Hourly rounding completed on this pt. Offered assistance for toileting or hygiene at this time. Provided opportunity for snack nourishment or PO fluid hydration. Pt is up-to-date on plan of care. No pain interventions required at this time. Warm blanket offered, call bell within reach, safety precautions in place, bed locked and in the lowest position.        Oskar Washington RN  10/29/23 7258

## 2023-10-30 ENCOUNTER — APPOINTMENT (OUTPATIENT)
Facility: HOSPITAL | Age: 78
DRG: 574 | End: 2023-10-30
Attending: INTERNAL MEDICINE
Payer: MEDICARE

## 2023-10-30 LAB
ANION GAP SERPL CALC-SCNC: 8 MMOL/L (ref 5–15)
BUN SERPL-MCNC: 23 MG/DL (ref 6–20)
BUN/CREAT SERPL: 15 (ref 12–20)
CALCIUM SERPL-MCNC: 8.2 MG/DL (ref 8.5–10.1)
CHLORIDE SERPL-SCNC: 102 MMOL/L (ref 97–108)
CO2 SERPL-SCNC: 26 MMOL/L (ref 21–32)
CREAT SERPL-MCNC: 1.55 MG/DL (ref 0.55–1.02)
GLUCOSE SERPL-MCNC: 90 MG/DL (ref 65–100)
MAGNESIUM SERPL-MCNC: 1.8 MG/DL (ref 1.6–2.4)
POTASSIUM SERPL-SCNC: 3.7 MMOL/L (ref 3.5–5.1)
SODIUM SERPL-SCNC: 136 MMOL/L (ref 136–145)
VANCOMYCIN SERPL-MCNC: 26.6 UG/ML

## 2023-10-30 PROCEDURE — 6370000000 HC RX 637 (ALT 250 FOR IP): Performed by: INTERNAL MEDICINE

## 2023-10-30 PROCEDURE — 1200000000 HC SEMI PRIVATE

## 2023-10-30 PROCEDURE — 80048 BASIC METABOLIC PNL TOTAL CA: CPT

## 2023-10-30 PROCEDURE — 93971 EXTREMITY STUDY: CPT | Performed by: INTERNAL MEDICINE

## 2023-10-30 PROCEDURE — 83735 ASSAY OF MAGNESIUM: CPT

## 2023-10-30 PROCEDURE — 93971 EXTREMITY STUDY: CPT

## 2023-10-30 PROCEDURE — 80202 ASSAY OF VANCOMYCIN: CPT

## 2023-10-30 PROCEDURE — 36415 COLL VENOUS BLD VENIPUNCTURE: CPT

## 2023-10-30 PROCEDURE — 6360000002 HC RX W HCPCS: Performed by: INTERNAL MEDICINE

## 2023-10-30 PROCEDURE — 2580000003 HC RX 258: Performed by: INTERNAL MEDICINE

## 2023-10-30 RX ORDER — ENOXAPARIN SODIUM 100 MG/ML
40 INJECTION SUBCUTANEOUS DAILY
Status: DISCONTINUED | OUTPATIENT
Start: 2023-10-30 | End: 2023-11-04 | Stop reason: HOSPADM

## 2023-10-30 RX ORDER — FUROSEMIDE 20 MG/1
20 TABLET ORAL DAILY
Status: DISCONTINUED | OUTPATIENT
Start: 2023-10-30 | End: 2023-11-02

## 2023-10-30 RX ADMIN — SODIUM CHLORIDE, PRESERVATIVE FREE 10 ML: 5 INJECTION INTRAVENOUS at 08:48

## 2023-10-30 RX ADMIN — ENOXAPARIN SODIUM 40 MG: 100 INJECTION SUBCUTANEOUS at 22:02

## 2023-10-30 RX ADMIN — VANCOMYCIN HYDROCHLORIDE 1000 MG: 1 INJECTION, POWDER, LYOPHILIZED, FOR SOLUTION INTRAVENOUS at 16:09

## 2023-10-30 RX ADMIN — ACETAMINOPHEN 650 MG: 325 TABLET ORAL at 18:13

## 2023-10-30 RX ADMIN — SODIUM CHLORIDE, PRESERVATIVE FREE 10 ML: 5 INJECTION INTRAVENOUS at 22:06

## 2023-10-30 RX ADMIN — CEFEPIME 2000 MG: 2 INJECTION, POWDER, FOR SOLUTION INTRAVENOUS at 12:41

## 2023-10-30 RX ADMIN — ASPIRIN 81 MG 81 MG: 81 TABLET ORAL at 08:46

## 2023-10-30 RX ADMIN — POTASSIUM BICARBONATE 50 MEQ: 977.5 TABLET, EFFERVESCENT ORAL at 08:46

## 2023-10-30 RX ADMIN — LEVOTHYROXINE SODIUM 50 MCG: 0.05 TABLET ORAL at 08:46

## 2023-10-30 RX ADMIN — ATORVASTATIN CALCIUM 80 MG: 40 TABLET, FILM COATED ORAL at 22:02

## 2023-10-30 RX ADMIN — AMIODARONE HYDROCHLORIDE 200 MG: 200 TABLET ORAL at 08:46

## 2023-10-30 RX ADMIN — FUROSEMIDE 20 MG: 20 TABLET ORAL at 10:35

## 2023-10-30 RX ADMIN — ACETAMINOPHEN 650 MG: 325 TABLET ORAL at 02:06

## 2023-10-30 RX ADMIN — EMPAGLIFLOZIN 10 MG: 10 TABLET, FILM COATED ORAL at 08:47

## 2023-10-30 ASSESSMENT — PAIN DESCRIPTION - DESCRIPTORS: DESCRIPTORS: ACHING

## 2023-10-30 ASSESSMENT — PAIN SCALES - GENERAL
PAINLEVEL_OUTOF10: 0
PAINLEVEL_OUTOF10: 5

## 2023-10-30 ASSESSMENT — PAIN DESCRIPTION - LOCATION: LOCATION: LEG

## 2023-10-30 ASSESSMENT — PAIN DESCRIPTION - FREQUENCY: FREQUENCY: INTERMITTENT

## 2023-10-30 ASSESSMENT — PAIN DESCRIPTION - ORIENTATION: ORIENTATION: RIGHT

## 2023-10-30 NOTE — PLAN OF CARE
Problem: Discharge Planning  Goal: Discharge to home or other facility with appropriate resources  Outcome: Progressing  Flowsheets (Taken 10/29/2023 2211 by Dian Cross RN)  Discharge to home or other facility with appropriate resources: Identify barriers to discharge with patient and caregiver     Problem: Pain  Goal: Verbalizes/displays adequate comfort level or baseline comfort level  Outcome: Progressing     Problem: Skin/Tissue Integrity  Goal: Absence of new skin breakdown  Description: 1. Monitor for areas of redness and/or skin breakdown  2. Assess vascular access sites hourly  3. Every 4-6 hours minimum:  Change oxygen saturation probe site  4. Every 4-6 hours:  If on nasal continuous positive airway pressure, respiratory therapy assess nares and determine need for appliance change or resting period.   Outcome: Progressing     Problem: Safety - Adult  Goal: Free from fall injury  Outcome: Progressing     Problem: Chronic Conditions and Co-morbidities  Goal: Patient's chronic conditions and co-morbidity symptoms are monitored and maintained or improved  Outcome: Progressing

## 2023-10-30 NOTE — PROGRESS NOTES
Bedside and Verbal shift change report given to [facility] (oncoming nurse) by Amirah Paz (offgoing nurse). Report included the following information Nurse Handoff Report, Intake/Output, MAR, and Recent Results.

## 2023-10-30 NOTE — PROGRESS NOTES
Enoxaparin Guidelines    Recommendation: enoxaparin 30 mg SQ daily changed to enoxaparin 40 mg SQ daily for CrCl > 30 ml/min     TABLE 1. ENOXAPARIN ROUTINE PROPHYLAXIS DOSING (Medically ill, routine surgery)   Patient Weight (kg)     50.9 and below 51 - 100.9 101 - 150.9 151 - 174.9 175 or greater         Estimated CrCl  (ml/min) 30 or greater   30 mg SUBQ daily   40 mg SUBQ daily 30 mg SUBQ BID  40 mg SUBQ BID 60mg SUBQ BID      15-29 UFH 5000 units SUBQ BID 30 mg SUBQ daily 30 mg SUBQ daily 40 mg SUBQ daily   60 mg SUBQ daily      Less than 15 or Dialysis UFH 5000 units SUBQ BID UFH 5000 units SUBQ TID UFH 7500 units SUBQ TID   TABLE 2. ENOXAPARIN TREATMENT DOSING   (Based on 1mg/kg BID for DVT/PE/AFib)   Patient Weight (kg)     50.9 and below .9 151-189.9 190 or greater         Estimated CrCl  (ml/min) 30 or greater Recommend [facility] standardized UFH infusion, apixaban or rivaroxaban 1mg/kg SUBQ BID 1mg/kg SUBQ BID if anti-Xa levels are feasible per site. Alternatively,  recommend switch to [facility] standardized UFH infusion     Recommend switch to [facility] standardized UFH infusion. 15-29 Recommend [facility] standardized UFH infusion or apixaban 1mg/kg SUBQ daily Recommend switch to [facility] standardized UFH infusion     Less than 15 or HD Recommend switch to [facility] standardized UFH infusion.    Asha Garrett [facility] 10/30/2023 9:34 AM

## 2023-10-30 NOTE — CARE COORDINATION
GAMA:    RUR: 14%     10/30/23 1044   Service Assessment   Patient Orientation Alert and Oriented   Cognition Alert   History Provided By Patient   Primary Caregiver Self   Accompanied By/Relationship None   Support Systems Family Members;Friends/Neighbors  (Patient says that she lives right next to her best friend and sees them on a regular basis.)   Patient's Healthcare Decision Maker is: Legal Next of [address] [facility]  Karen Overton - [phone], and sister, Gen Stearns - [phone] (2nd))   PCP Verified by CM Yes  Shayy Sanchez, [address])   Last Visit to PCP Within last 3 months   Prior Functional Level Independent in ADLs/IADLs  (Uses rollator for mobility)   Current Functional Level Assistance with the following:;Mobility  (Due to foot wound)   Can patient return to prior living arrangement Yes   Ability to make needs known: Good   Family able to assist with home care needs: No  (Patient lives alone)   Would you like for me to discuss the discharge plan with any other family members/significant others, and if so, who? No   Financial Resources [facility];Transportation  (Patient has a friend that drives her to medical appointments and lives in an assisted living apartment ([facility] [address]))   CM/SW Referral Other (see comment);DME  (Discharge planning: PCP appt, home health appt, DME, specialist appt, transportation, [facility] Referral)   Social/Functional History   Lives With Alone   Type of Home Assisted living   Discharge Planning   Type of Residence Apartment  ([address] [place] [address] in an assisted living community ([facility] [address]))   Living Arrangements Alone   Current Services Prior To Admission Durable Medical Equipment;Transportation   Current DME Prior to [employer]; Shower Chair  (Patient needs assistance with setting up her shower chair)   [address] [place] [address] Ne CM met with pt in room to complete initial assessment. Pt confirmed that she lives at [facility]. [facility] [address] [facility] as her current pharmacy. Pt will need assistance with transportation home from the hospital.     Pt does not think that she has applied for Medicaid before. Pt agreeable to a referral to [employer] to receive assistance with applying for Medicaid as a secondary health insurance. Pt told CM that she was recently discharged from [facility] and was supposed to have home health services upon discharge. Pt never heard from the home health agency after going home. Pt could not remember the name of the home health agency. Pt told CM that she has never had home health services before. Pt uses a rollator at home. Pt has a shower chair but has not been able to set it up yet. She feels that she needs assistance with this. Pt would also like a bedside commode. CM to discuss this with PT. CM discussed a referral to [facility] with pt. Pt agreeable to referral. It seems that pt may benefit from further assistance with transportation to medical appointments, assistance with setting up DME in her home, assistance with prepared meals, and assistance with light housekeeping while she recovers from the wound on her foot. CM to send the referral to the [facility] through [employer]. Pt did not have any other questions or concerns at this time. Pt has a hospital follow up appt scheduled with her PCP for 11/6 at 9:45 AM. Appt on AVS.    CM to follow for discharge planning.     Karlee Issa, [address],   [phone]

## 2023-10-30 NOTE — PROGRESS NOTES
[facility] Renally-Adjusted Medication    Medication Cefepime 2 g IV q 24 hours    Dose Changed to Cefepime 2 g IV q 12 hours for deep seeded infection, Scr improvement    Serum Creatinine Lab Results   Component Value Date/Time    CREATININE 1.55 10/30/2023 05:39 AM      BUN Lab Results   Component Value Date/Time    BUN 23 10/30/2023 05:39 AM      Estimated CrCl: 31 ml/min    Pharmacy will follow the patient daily and make dose adjustments based on patient's clinical situation and renal function.     Thank you,     Asha Garrett, [facility]

## 2023-10-31 ENCOUNTER — APPOINTMENT (OUTPATIENT)
Facility: HOSPITAL | Age: 78
DRG: 574 | End: 2023-10-31
Attending: INTERNAL MEDICINE
Payer: MEDICARE

## 2023-10-31 LAB
ANION GAP SERPL CALC-SCNC: 8 MMOL/L (ref 5–15)
BUN SERPL-MCNC: 19 MG/DL (ref 6–20)
BUN/CREAT SERPL: 11 (ref 12–20)
CALCIUM SERPL-MCNC: 8.4 MG/DL (ref 8.5–10.1)
CHLORIDE SERPL-SCNC: 103 MMOL/L (ref 97–108)
CO2 SERPL-SCNC: 26 MMOL/L (ref 21–32)
CREAT SERPL-MCNC: 1.7 MG/DL (ref 0.55–1.02)
ECHO AO ROOT DIAM: 2.9 CM
ECHO AO ROOT INDEX: 1.58 CM/M2
ECHO AR MAX VEL PISA: 2.8 M/S
ECHO AV AREA PEAK VELOCITY: 2.1 CM2
ECHO AV AREA PLAN/BSA: 1.09 CM2/M2
ECHO AV AREA PLAN: 2 CM2
ECHO AV AREA/BSA PEAK VELOCITY: 1.1 CM2/M2
ECHO AV PEAK GRADIENT: 7 MMHG
ECHO AV PEAK VELOCITY: 1.3 M/S
ECHO AV REGURGITANT PHT: 419.4 MILLISECOND
ECHO AV VELOCITY RATIO: 0.69
ECHO BSA: 1.9 M2
ECHO BSA: 1.9 M2
ECHO LA DIAMETER INDEX: 2.45 CM/M2
ECHO LA DIAMETER: 4.5 CM
ECHO LA TO AORTIC ROOT RATIO: 1.55
ECHO LA VOL A-L A4C: 64 ML (ref 22–52)
ECHO LA VOLUME INDEX A-L A4C: 35 ML/M2 (ref 16–34)
ECHO LV EDV A4C: 110 ML
ECHO LV EDV INDEX A4C: 60 ML/M2
ECHO LV EJECTION FRACTION A4C: 65 %
ECHO LV ESV A4C: 39 ML
ECHO LV ESV INDEX A4C: 21 ML/M2
ECHO LV FRACTIONAL SHORTENING: 28 % (ref 28–44)
ECHO LV INTERNAL DIMENSION DIASTOLE INDEX: 2.5 CM/M2
ECHO LV INTERNAL DIMENSION DIASTOLIC: 4.6 CM (ref 3.9–5.3)
ECHO LV INTERNAL DIMENSION SYSTOLIC INDEX: 1.79 CM/M2
ECHO LV INTERNAL DIMENSION SYSTOLIC: 3.3 CM
ECHO LV IVSD: 1.1 CM (ref 0.6–0.9)
ECHO LV MASS 2D: 181.2 G (ref 67–162)
ECHO LV MASS INDEX 2D: 98.5 G/M2 (ref 43–95)
ECHO LV POSTERIOR WALL DIASTOLIC: 1.1 CM (ref 0.6–0.9)
ECHO LV RELATIVE WALL THICKNESS RATIO: 0.48
ECHO LVOT AREA: 3.1 CM2
ECHO LVOT DIAM: 2 CM
ECHO LVOT PEAK GRADIENT: 4 MMHG
ECHO LVOT PEAK VELOCITY: 0.9 M/S
ECHO MV A VELOCITY: 1.41 M/S
ECHO MV AREA PHT: 6.3 CM2
ECHO MV E DECELERATION TIME (DT): 108.6 MS
ECHO MV E VELOCITY: 1.07 M/S
ECHO MV E/A RATIO: 0.76
ECHO MV MAX VELOCITY: 1.6 M/S
ECHO MV MEAN GRADIENT: 6 MMHG
ECHO MV MEAN VELOCITY: 1.1 M/S
ECHO MV PEAK GRADIENT: 10 MMHG
ECHO MV PRESSURE HALF TIME (PHT): 35.1 MS
ECHO MV VTI: 32.2 CM
ECHO PULMONARY ARTERY END DIASTOLIC PRESSURE: 19 MMHG
ECHO PV MAX VELOCITY: 0.6 M/S
ECHO PV PEAK GRADIENT: 2 MMHG
ECHO TV REGURGITANT MAX VELOCITY: 2.34 M/S
ECHO TV REGURGITANT PEAK GRADIENT: 22 MMHG
GLUCOSE SERPL-MCNC: 87 MG/DL (ref 65–100)
MAGNESIUM SERPL-MCNC: 1.6 MG/DL (ref 1.6–2.4)
NT PRO BNP: 4157 PG/ML (ref 0–450)
POTASSIUM SERPL-SCNC: 4 MMOL/L (ref 3.5–5.1)
SODIUM SERPL-SCNC: 137 MMOL/L (ref 136–145)

## 2023-10-31 PROCEDURE — 97161 PT EVAL LOW COMPLEX 20 MIN: CPT | Performed by: PHYSICAL THERAPIST

## 2023-10-31 PROCEDURE — 6360000002 HC RX W HCPCS: Performed by: INTERNAL MEDICINE

## 2023-10-31 PROCEDURE — 36415 COLL VENOUS BLD VENIPUNCTURE: CPT

## 2023-10-31 PROCEDURE — 2580000003 HC RX 258: Performed by: INTERNAL MEDICINE

## 2023-10-31 PROCEDURE — 93306 TTE W/DOPPLER COMPLETE: CPT | Performed by: SPECIALIST

## 2023-10-31 PROCEDURE — 93306 TTE W/DOPPLER COMPLETE: CPT

## 2023-10-31 PROCEDURE — 83880 ASSAY OF NATRIURETIC PEPTIDE: CPT

## 2023-10-31 PROCEDURE — 1200000000 HC SEMI PRIVATE

## 2023-10-31 PROCEDURE — 80048 BASIC METABOLIC PNL TOTAL CA: CPT

## 2023-10-31 PROCEDURE — 83735 ASSAY OF MAGNESIUM: CPT

## 2023-10-31 PROCEDURE — 6370000000 HC RX 637 (ALT 250 FOR IP): Performed by: INTERNAL MEDICINE

## 2023-10-31 RX ADMIN — VANCOMYCIN HYDROCHLORIDE 750 MG: 750 INJECTION, POWDER, LYOPHILIZED, FOR SOLUTION INTRAVENOUS at 16:44

## 2023-10-31 RX ADMIN — ASPIRIN 81 MG 81 MG: 81 TABLET ORAL at 09:44

## 2023-10-31 RX ADMIN — EMPAGLIFLOZIN 10 MG: 10 TABLET, FILM COATED ORAL at 09:44

## 2023-10-31 RX ADMIN — LEVOTHYROXINE SODIUM 50 MCG: 0.05 TABLET ORAL at 06:33

## 2023-10-31 RX ADMIN — SODIUM CHLORIDE, PRESERVATIVE FREE 10 ML: 5 INJECTION INTRAVENOUS at 09:44

## 2023-10-31 RX ADMIN — AMIODARONE HYDROCHLORIDE 200 MG: 200 TABLET ORAL at 09:44

## 2023-10-31 RX ADMIN — ENOXAPARIN SODIUM 40 MG: 100 INJECTION SUBCUTANEOUS at 20:49

## 2023-10-31 RX ADMIN — CEFEPIME 2000 MG: 2 INJECTION, POWDER, FOR SOLUTION INTRAVENOUS at 13:04

## 2023-10-31 RX ADMIN — CEFEPIME 2000 MG: 2 INJECTION, POWDER, FOR SOLUTION INTRAVENOUS at 02:04

## 2023-10-31 RX ADMIN — ATORVASTATIN CALCIUM 80 MG: 40 TABLET, FILM COATED ORAL at 20:49

## 2023-10-31 RX ADMIN — FUROSEMIDE 20 MG: 20 TABLET ORAL at 09:44

## 2023-10-31 RX ADMIN — SODIUM CHLORIDE, PRESERVATIVE FREE 10 ML: 5 INJECTION INTRAVENOUS at 20:49

## 2023-10-31 RX ADMIN — COLLAGENASE SANTYL: 250 OINTMENT TOPICAL at 10:58

## 2023-10-31 ASSESSMENT — PAIN SCALES - GENERAL
PAINLEVEL_OUTOF10: 0
PAINLEVEL_OUTOF10: 0

## 2023-10-31 NOTE — PROGRESS NOTES
[address] [facility]  Hospitalist Group                                                                                          Hospitalist Progress Note  Ham Brennan MD  Office Phone: [phone]        Date of Service:  10/30/2023  NAME:  Cheyenne Moore  :    MRN:  [id number]       Admission Summary:   Cheyenne Moore is a 69 y.o. female who presents with with past medical history of CKD, heart failure, CAD presents to the ED department due to left lower extremity wound. [facility] went to [facility] due to lower extremity swelling and a cut in her left foot. Her left foot was wrapped in gauze and she was discharged on 10/24/2023. She endorsed that wound care nurse was supposed to come each day and unwrap the wound but they never arrived. Patient lives in a senior independent facility and was supposed to receive home health care twice a week as well. On 10/28/2023 patient's foot was unwrapped and it was noted to be draining purulent fluid with the addition of maggots. She did arrive to ED nontoxic afebrile, and hemodynamically stable. On exam patient's lower extremities were pitting +3 however patient was not in acute exacerbation . Significant lab results include WBC within normal limits, , creatinine 2.28, BUN 3 4, , . Patient admitted for wound care and parenteral antibiotic therapy for the treatment of cellulitis.        Interval history / Subjective:   -Patient requesting a wound care nurse see her wound         Assessment & Plan:         #Cellulitis  -wound unstagable, purulent, maggots  -vancomycin IV  -adding cefepime for pseudomonal coverage as patient's wound is deeply infected and patient recently received antibiotics at a healthcare facility recently   -wound care nurse consult     #HFpEF  -no exacerbation  -ECHO 10/23:EF 55-60% 10/23 with aortic regurg  -NYHA 3  -decompensated, lower ext edema +3  -BNP ordered  -fluid restriction, strict

## 2023-10-31 NOTE — PROGRESS NOTES
Patient placed in Specialty Bed per verbal and written order from [facility].

## 2023-10-31 NOTE — WOUND CARE
WOUND Note:     New consult for Left foot wound    Chart shows:  Admitted on 10/28/23 for Cellulitis   History of Arthritis; CAD; CKD; HF; HTN; NSTEMI; Ventricular fibrillation; AICD    Assessment:   A&O x 4 and reports no pain   Incontinent of bowel and bladder  Surface: Versacare mattress  WBC- 5.0    Left heel intact and without erythema. Heels offloaded with pillows. Buttocks intact without erythema    1. POA wound to left dorsal foot  5.8 x 3.1 x 0.5 cm  100%  moist, adherent slough tissue. Patient had 4 maggots left in wound. Small serous exudate; pink wound edges. Periwound dry/flaky skin. No malodor or purulence. Tx: Irrigated with saline; applied moist to dry dressing, wrapped with rolled gauze and ace bandage. 2. POA DTI to lateral right foot  1.0 x 1.0 x 0.0 cm  Non blanchable erythema; purple/maroon in color. Dry/intact. No exudate. Tx: off loaded heel/foot. Left open to air. 3. POA Stage 2 to Coccyx  1.5 x 1.5  x  0.2 cm  Scant serousang exudate; periwound indurated. No malodor or purulence - no gross S&S of infection  Periwound & with slight erythema    Tx: Cleansed with moisture wipes. Applied zinc ointment. Wound Recommendations:    Daily to Left Foot wound: Irrigate with saline; apply nickel thick santyl; cover with moist to dry gauze; secure with rolled gauze and ace bandage (loosely wrapped)    Every 3 days and as needed:  Cover right lateral Deep tissue injury with foam.    As needed to Coccyx Stage 2 - cleanse with soap and water or moisture wipes; apply zinc ointment    PI Prevention:  Turn/reposition approximately every 2 hours  Offload heels with heels hanging off end of pillow at all times while in bed. Sacral Foam dressing: lift to assess regularly; change as needed. Discontinue if incontinence is frequently soiling dressing. Zinc ointment to buttocks and sacrum daily and as needed with incontinence care  Low Air Loss mattress ordered today.  Use only flat sheet and one incontinence pad. Discussed with Dr. Jonatan Maza and Juliette, RN.   Consult placed for Dr. Danny Torrez    Transition of Care: Plan to follow weekly and as needed while admitted to hospital.      Ten Gracia, RN, BSN  Wound Care Nurse, [facility]  [phone]

## 2023-10-31 NOTE — CARE COORDINATION
GAMA     RUR 12 %      IDR Rounds this am with MD and team.  Continue Plan of care. PT, Wound Care   MINAL 24-48 hours depending on Stability. See CM previous note from  10-30-23. Plan   Clarify the Home Health agency and follow-up  [address]   Second IMM letter   Transportation home. Doubt this friend will be able to pick her up. Referral to [facility]. Possible [facility] if applying for Medicaid. Talked to patient this evening regarding discharge planning. Review therapy  note and concerns from the medical team.    She lives in an apartment- alone. She indicates her good Friend-Usha Burger helps her with errands- washes her clothes and sometimes with meals. She does not want her to assist with her wound care. She indicates U had sent up [facility] For someone to assist her 3 times a week. The first time the agency came was the day she came in the  hospital.  She had not taken the bandage off since she had left [facility]. Asked about her granddaughter- she indicates she is not available to help. Did not give permission to call her. Asked about her sister- she lives in [facility]. The patient indicates she is the oldest sibling. Patient still was unable to tell the name of the [facility]. Reviewed [facility] notes again-Was able to find the [address] information . [address]- Phone: [phone] the aferhour. Line and left a message for someone to call us tomorrow. -If not was told to call back at 9am.    Discussed other option- possible SNF she is not interested and want to try to go home and she how she can manage. Discussed Medicaid again- she has not applied. She indicates her income is a little over 1000.00 per month- would recommend she apply to see if she qualifies for a category- anticipating she needing more home care. [facility]. Pao YIN RN    [phone]

## 2023-10-31 NOTE — PROGRESS NOTES
[facility] Hospitalist Group                                                                               Hospitalist Progress Note  Angelo June MD          Date of Service:  10/31/2023  NAME:  Micheline Peter  :    MRN:  [id number]    Please note that this dictation was completed with [employer], the computer voice recognition software. Quite often unanticipated grammatical, syntax, homophones, and other interpretive errors are inadvertently transcribed by the computer software. Please disregard these errors. Please excuse any errors that have escaped final proofreading. Admission Summary:   Micheline Peter is a 69 y.o. female who presents with with past medical history of CKD, heart failure, CAD presents to the ED department due to left lower extremity wound. Patient originally went to [facility] due to lower extremity swelling and a cut in her left foot. Her left foot was wrapped in gauze and she was discharged on 10/24/2023. She endorsed that wound care nurse was supposed to come each day and unwrap the wound but they never arrived. Patient lives in a senior independent facility and was supposed to receive home health care twice a week as well. On 10/28/2023 patient's foot was unwrapped and it was noted to be draining purulent fluid with the addition of maggots. She did arrive to ED nontoxic afebrile, and hemodynamically stable. On exam patient's lower extremities were pitting +3 however patient was not in acute exacerbation . Significant lab results include WBC within normal limits, , creatinine 2.28, BUN 3 4, , . Patient admitted for wound care and parenteral antibiotic therapy for the treatment of cellulitis. Interval history / Subjective:   Patient did not have any acute event overnight.       Assessment & Plan:     Anticipated discharge date : TBD  Anticipated disposition : TBD  Barriers to discharge :        #Left foot

## 2023-11-01 ENCOUNTER — APPOINTMENT (OUTPATIENT)
Facility: HOSPITAL | Age: 78
DRG: 574 | End: 2023-11-01
Payer: MEDICARE

## 2023-11-01 ENCOUNTER — APPOINTMENT (OUTPATIENT)
Facility: HOSPITAL | Age: 78
DRG: 574 | End: 2023-11-01
Attending: PODIATRIST
Payer: MEDICARE

## 2023-11-01 ENCOUNTER — PREP FOR PROCEDURE (OUTPATIENT)
Facility: HOSPITAL | Age: 78
End: 2023-11-01

## 2023-11-01 DIAGNOSIS — S91.302D OPEN WOUND OF LEFT FOOT, SUBSEQUENT ENCOUNTER: ICD-10-CM

## 2023-11-01 PROBLEM — S91.302A OPEN WOUND OF LEFT FOOT: Status: ACTIVE | Noted: 2023-11-01

## 2023-11-01 LAB
ANION GAP SERPL CALC-SCNC: 7 MMOL/L (ref 5–15)
BUN SERPL-MCNC: 18 MG/DL (ref 6–20)
BUN/CREAT SERPL: 12 (ref 12–20)
CALCIUM SERPL-MCNC: 8.3 MG/DL (ref 8.5–10.1)
CHLORIDE SERPL-SCNC: 103 MMOL/L (ref 97–108)
CO2 SERPL-SCNC: 28 MMOL/L (ref 21–32)
CREAT SERPL-MCNC: 1.48 MG/DL (ref 0.55–1.02)
GLUCOSE SERPL-MCNC: 92 MG/DL (ref 65–100)
MAGNESIUM SERPL-MCNC: 1.4 MG/DL (ref 1.6–2.4)
POTASSIUM SERPL-SCNC: 3.7 MMOL/L (ref 3.5–5.1)
SODIUM SERPL-SCNC: 138 MMOL/L (ref 136–145)
T4 FREE SERPL-MCNC: 1.2 NG/DL (ref 0.8–1.5)

## 2023-11-01 PROCEDURE — 2580000003 HC RX 258: Performed by: INTERNAL MEDICINE

## 2023-11-01 PROCEDURE — 97530 THERAPEUTIC ACTIVITIES: CPT | Performed by: PHYSICAL THERAPIST

## 2023-11-01 PROCEDURE — 6360000002 HC RX W HCPCS: Performed by: INTERNAL MEDICINE

## 2023-11-01 PROCEDURE — 87070 CULTURE OTHR SPECIMN AEROBIC: CPT

## 2023-11-01 PROCEDURE — 87077 CULTURE AEROBIC IDENTIFY: CPT

## 2023-11-01 PROCEDURE — 36415 COLL VENOUS BLD VENIPUNCTURE: CPT

## 2023-11-01 PROCEDURE — 6370000000 HC RX 637 (ALT 250 FOR IP): Performed by: PODIATRIST

## 2023-11-01 PROCEDURE — 87205 SMEAR GRAM STAIN: CPT

## 2023-11-01 PROCEDURE — 73630 X-RAY EXAM OF FOOT: CPT

## 2023-11-01 PROCEDURE — 87075 CULTR BACTERIA EXCEPT BLOOD: CPT

## 2023-11-01 PROCEDURE — 6370000000 HC RX 637 (ALT 250 FOR IP): Performed by: INTERNAL MEDICINE

## 2023-11-01 PROCEDURE — 93922 UPR/L XTREMITY ART 2 LEVELS: CPT | Performed by: INTERNAL MEDICINE

## 2023-11-01 PROCEDURE — 2700000000 HC OXYGEN THERAPY PER DAY

## 2023-11-01 PROCEDURE — 1200000000 HC SEMI PRIVATE

## 2023-11-01 PROCEDURE — 80048 BASIC METABOLIC PNL TOTAL CA: CPT

## 2023-11-01 PROCEDURE — 83735 ASSAY OF MAGNESIUM: CPT

## 2023-11-01 PROCEDURE — 87186 SC STD MICRODIL/AGAR DIL: CPT

## 2023-11-01 PROCEDURE — 93922 UPR/L XTREMITY ART 2 LEVELS: CPT

## 2023-11-01 PROCEDURE — 84439 ASSAY OF FREE THYROXINE: CPT

## 2023-11-01 RX ORDER — SODIUM CHLORIDE 9 MG/ML
INJECTION, SOLUTION INTRAVENOUS PRN
Status: CANCELLED | OUTPATIENT
Start: 2023-11-01

## 2023-11-01 RX ORDER — LEVOTHYROXINE SODIUM 0.05 MG/1
50 TABLET ORAL DAILY
Status: DISCONTINUED | OUTPATIENT
Start: 2023-11-01 | End: 2023-11-04 | Stop reason: HOSPADM

## 2023-11-01 RX ORDER — SODIUM CHLORIDE 0.9 % (FLUSH) 0.9 %
5-40 SYRINGE (ML) INJECTION PRN
Status: CANCELLED | OUTPATIENT
Start: 2023-11-01

## 2023-11-01 RX ORDER — SODIUM CHLORIDE 0.9 % (FLUSH) 0.9 %
5-40 SYRINGE (ML) INJECTION EVERY 12 HOURS SCHEDULED
Status: CANCELLED | OUTPATIENT
Start: 2023-11-01

## 2023-11-01 RX ORDER — SODIUM HYPOCHLORITE 1.25 MG/ML
SOLUTION TOPICAL DAILY
Status: DISCONTINUED | OUTPATIENT
Start: 2023-11-01 | End: 2023-11-01

## 2023-11-01 RX ORDER — DULOXETIN HYDROCHLORIDE 30 MG/1
30 CAPSULE, DELAYED RELEASE ORAL DAILY
Status: DISCONTINUED | OUTPATIENT
Start: 2023-11-01 | End: 2023-11-04 | Stop reason: HOSPADM

## 2023-11-01 RX ORDER — LANOLIN ALCOHOL/MO/W.PET/CERES
400 CREAM (GRAM) TOPICAL 2 TIMES DAILY
Status: COMPLETED | OUTPATIENT
Start: 2023-11-01 | End: 2023-11-01

## 2023-11-01 RX ORDER — SODIUM HYPOCHLORITE 1.25 MG/ML
SOLUTION TOPICAL 2 TIMES DAILY
Status: DISCONTINUED | OUTPATIENT
Start: 2023-11-01 | End: 2023-11-04 | Stop reason: HOSPADM

## 2023-11-01 RX ADMIN — ENOXAPARIN SODIUM 40 MG: 100 INJECTION SUBCUTANEOUS at 20:11

## 2023-11-01 RX ADMIN — ATORVASTATIN CALCIUM 80 MG: 40 TABLET, FILM COATED ORAL at 20:11

## 2023-11-01 RX ADMIN — ASPIRIN 81 MG 81 MG: 81 TABLET ORAL at 08:43

## 2023-11-01 RX ADMIN — LEVOTHYROXINE SODIUM 50 MCG: 0.05 TABLET ORAL at 13:14

## 2023-11-01 RX ADMIN — SODIUM HYPOCHLORITE: 1.25 SOLUTION TOPICAL at 19:13

## 2023-11-01 RX ADMIN — Medication 400 MG: at 10:05

## 2023-11-01 RX ADMIN — LEVOTHYROXINE SODIUM 50 MCG: 0.05 TABLET ORAL at 08:43

## 2023-11-01 RX ADMIN — FUROSEMIDE 20 MG: 20 TABLET ORAL at 08:43

## 2023-11-01 RX ADMIN — COLLAGENASE SANTYL: 250 OINTMENT TOPICAL at 08:48

## 2023-11-01 RX ADMIN — DULOXETINE 30 MG: 30 CAPSULE, DELAYED RELEASE ORAL at 10:38

## 2023-11-01 RX ADMIN — Medication 400 MG: at 20:11

## 2023-11-01 RX ADMIN — EMPAGLIFLOZIN 10 MG: 10 TABLET, FILM COATED ORAL at 08:43

## 2023-11-01 RX ADMIN — VANCOMYCIN HYDROCHLORIDE 750 MG: 750 INJECTION, POWDER, LYOPHILIZED, FOR SOLUTION INTRAVENOUS at 17:31

## 2023-11-01 RX ADMIN — AMIODARONE HYDROCHLORIDE 200 MG: 200 TABLET ORAL at 08:43

## 2023-11-01 RX ADMIN — CEFEPIME 2000 MG: 2 INJECTION, POWDER, FOR SOLUTION INTRAVENOUS at 13:14

## 2023-11-01 RX ADMIN — SODIUM CHLORIDE, PRESERVATIVE FREE 10 ML: 5 INJECTION INTRAVENOUS at 08:48

## 2023-11-01 RX ADMIN — SODIUM CHLORIDE, PRESERVATIVE FREE 5 ML: 5 INJECTION INTRAVENOUS at 20:11

## 2023-11-01 RX ADMIN — CEFEPIME 2000 MG: 2 INJECTION, POWDER, FOR SOLUTION INTRAVENOUS at 01:23

## 2023-11-01 ASSESSMENT — PAIN SCALES - GENERAL: PAINLEVEL_OUTOF10: 0

## 2023-11-01 NOTE — PROGRESS NOTES
Problem: Physical Therapy - Adult  Goal: By Discharge: Performs mobility at highest level of function for planned discharge setting. See evaluation for individualized goals. Description: FUNCTIONAL STATUS PRIOR TO ADMISSION: Patient was modified independent using a rollator for functional mobility. HOME SUPPORT PRIOR TO ADMISSION: The patient lived in assisted living facility but did not require assistance for mobility. Physical Therapy Goals  Initiated 10/31/2023  1. Patient will move from supine to sit and sit to supine  in bed with independence within 7 day(s). 2.  Patient will transfer from bed to chair and chair to bed with independence using the least restrictive device within 7 day(s). 3.  Patient will perform sit to stand with independence within 7 day(s). 4.  Patient will ambulate with independence for 50 feet with the least restrictive device within 7 day(s). Outcome: Progressing    PHYSICAL THERAPY TREATMENT    Patient: Conner Anaya (74 y.o. female)  Date: 11/1/2023  Diagnosis: Cellulitis [L03.90]  Cellulitis of left lower extremity [L03.116] Cellulitis      Precautions: fall risk, safety awareness      ASSESSMENT:  Patient continues to benefit from skilled PT services and is slowly progressing towards goals. Patient able to transfer bed to chair using rolling walker and up to [place]ap[place] from PT. Patient continues to require consistent verbal and manual cues to maintain safety throughout mobility tasks. PLAN:  Patient continues to benefit from skilled intervention to address the above impairments. Continue treatment per established plan of care. Recommendation for discharge: (in order for the patient to meet his/her long term goals):  Therapy 2 days/week in the home or Therapy up to 5 days/week in Skilled nursing facility    Other factors to consider for discharge: no additional factors    IF patient discharges home will need the following DME: patient owns DME required for

## 2023-11-01 NOTE — PROGRESS NOTES
Foot and Ankle Progress Note    Admit Date: 10/28/2023  Hospital day 4    Subjective:     Patient was admitted thru ER secondary to open wound with maggots left foot of unknown duration. No complaints of pain from the left foot    No Known Allergies     History:     Family History   Problem Relation Age of Onset    Kidney Disease Other         daughter    Heart Disease Other         daughter with enlarged heart    Tuberculosis Father       Past Medical History:   Diagnosis Date    Arthritis     CAD (coronary artery disease)     CKD (chronic kidney disease) stage 3, GFR 30-59 ml/min ([facility]) 12/24/2019    Heart failure ([facility])     Hypertension     NSTEMI (non-ST elevated myocardial infarction) ([address]) 12/24/2019    Ventricular fibrillation ([address]) 12/24/2019    AICD fired due to Vfib    Ventricular tachycardia ([address]) 12/24/2019      Social History     Substance and Sexual Activity   Alcohol Use Yes      Social History     Substance and Sexual Activity   Drug Use No      Past Surgical History:   Procedure Laterality Date    CORONARY ARTERY BYPASS GRAFT  2015    2v.     GYN      hysterectomy    OK UNLISTED PROCEDURE CARDIAC SURGERY      pacemaker/defibrilator placed 2 2015      Social History     Tobacco Use   Smoking Status Former   Smokeless Tobacco Never        Current Facility-Administered Medications   Medication Dose Route Frequency    collagenase ointment   Topical Daily    vancomycin (VANCOCIN) 750 mg in sodium chloride 0.9 % 250 mL IVPB (vial-mate)  750 mg IntraVENous Q24H    enoxaparin (LOVENOX) injection 40 mg  40 mg SubCUTAneous Daily    furosemide (LASIX) tablet 20 mg  20 mg Oral Daily    ceFEPIme (MAXIPIME) 2,000 mg in sodium chloride 0.9 % 100 mL IVPB (mini-bag)  2,000 mg IntraVENous Q12H    sodium chloride flush 0.9 % injection 5-40 mL  5-40 mL IntraVENous 2 times per day    sodium chloride flush 0.9 % injection 5-40 mL  5-40 mL IntraVENous PRN    0.9 % sodium chloride infusion   IntraVENous PRN    ondansetron

## 2023-11-01 NOTE — PROGRESS NOTES
[facility] Hospitalist Group                                                                               Hospitalist Progress Note  Irvin Saravia MD          Date of Service:  2023  NAME:  Ned Ambriz  :  [birth year]  MRN:  [id number]    Please note that this dictation was completed with [employer], the computer voice recognition software. Quite often unanticipated grammatical, syntax, homophones, and other interpretive errors are inadvertently transcribed by the computer software. Please disregard these errors. Please excuse any errors that have escaped final proofreading. Admission Summary:   Ned Ambriz is a 69 y.o. female who presents with with past medical history of CKD, heart failure, CAD presents to the ED department due to left lower extremity wound. Patient originally went to [facility] due to lower extremity swelling and a cut in her left foot. Her left foot was wrapped in gauze and she was discharged on 10/24/2023. She endorsed that wound care nurse was supposed to come each day and unwrap the wound but they never arrived. Patient lives in a senior independent facility and was supposed to receive home health care twice a week as well. On 10/28/2023 patient's foot was unwrapped and it was noted to be draining purulent fluid with the addition of maggots. She did arrive to ED nontoxic afebrile, and hemodynamically stable. On exam patient's lower extremities were pitting +3 however patient was not in acute exacerbation . Significant lab results include WBC within normal limits, , creatinine 2.28, BUN 3 4, , . Patient admitted for wound care and parenteral antibiotic therapy for the treatment of cellulitis. Interval history / Subjective:   Uneventful night     Assessment & Plan:     Anticipated discharge date : TBD  Anticipated disposition : TBD  Barriers to discharge :        #Left foot wound/cellulitis  -wound had maggots.   -IV diagnostic studies, imaging, and have provided independent interpretation of the same. Labs:   No results for input(s): \"WBC\", \"HGB\", \"HCT\", \"PLT\" in the last 72 hours. Recent Labs     10/30/23  0539 10/31/23  0431 11/01/23  0459    137 138   K 3.7 4.0 3.7    103 103   CO2 26 26 28   BUN 23* 19 18   MG 1.8 1.6 1.4*       No results for input(s): \"ALT\", \"TP\", \"ALB\", \"GLOB\", \"GGT\", \"AML\" in the last 72 hours. Invalid input(s): \"SGOT\", \"GPT\", \"AP\", \"TBIL\", \"TBILI\", \"AMYP\", \"LPSE\", \"HLPSE\"  No results for input(s): \"INR\", \"APTT\" in the last 72 hours. Invalid input(s): \"PTP\"   No results for input(s): \"TIBC\", \"FERR\" in the last 72 hours. Invalid input(s): \"FE\", \"PSAT\"     No results found for: \"FOL\", \"RBCF\"   No results for input(s): \"PH\", \"PCO2\", \"PO2\" in the last 72 hours. No results for input(s): \"CPK\" in the last 72 hours. Invalid input(s): \"CPKMB\", \"CKNDX\", \"TROIQ\"  Lab Results   Component Value Date/Time    CHOL 146 10/29/2023 07:21 AM    HDL 42 10/29/2023 07:21 AM     No results found for: \"GLUCPOC\"  [unfilled]    Notes reviewed from all clinical/nonclinical/nursing services involved in patient's clinical care. Care coordination discussions were held with appropriate clinical/nonclinical/ nursing providers based on care coordination needs. Patients current active Medications were reviewed, considered, added and adjusted based on the clinical condition today. Home Medications were reconciled to the best of my ability given all available resources at the time of admission. Route is PO if not otherwise noted.       Admission Status:[id number]:::1}      Medications Reviewed:     Current Facility-Administered Medications   Medication Dose Route Frequency    collagenase ointment   Topical Daily    vancomycin (VANCOCIN) 750 mg in sodium chloride 0.9 % 250 mL IVPB (vial-mate)  750 mg IntraVENous Q24H    enoxaparin (LOVENOX) injection 40 mg  40 mg SubCUTAneous Daily    furosemide (LASIX) tablet 20 mg  20 mg Oral Daily    ceFEPIme (MAXIPIME) 2,000 mg in sodium chloride 0.9 % 100 mL IVPB (mini-bag)  2,000 mg IntraVENous Q12H    sodium chloride flush 0.9 % injection 5-40 mL  5-40 mL IntraVENous 2 times per day    sodium chloride flush 0.9 % injection 5-40 mL  5-40 mL IntraVENous PRN    0.9 % sodium chloride infusion   IntraVENous PRN    ondansetron (ZOFRAN-ODT) disintegrating tablet 4 mg  4 mg Oral Q8H PRN    Or    ondansetron (ZOFRAN) injection 4 mg  4 mg IntraVENous Q6H PRN    polyethylene glycol (GLYCOLAX) packet 17 g  17 g Oral Daily PRN    acetaminophen (TYLENOL) tablet 650 mg  650 mg Oral Q6H PRN    Or    acetaminophen (TYLENOL) suppository 650 mg  650 mg Rectal Q6H PRN    empagliflozin (JARDIANCE) tablet 10 mg  10 mg Oral Daily    amiodarone (CORDARONE) tablet 200 mg  200 mg Oral Daily    aspirin chewable tablet 81 mg  81 mg Oral Daily    atorvastatin (LIPITOR) tablet 80 mg  80 mg Oral Nightly    levothyroxine (SYNTHROID) tablet 50 mcg  50 mcg Oral Daily     ______________________________________________________________________  EXPECTED LENGTH OF STAY: 9  ACTUAL LENGTH OF STAY:          4                 Rosa Floyd MD

## 2023-11-01 NOTE — PROGRESS NOTES
Bedside and Verbal shift change report given to [address] (oncoming nurse) by Katherin Ch (offgoing nurse). Report included the following information Nurse Handoff Report, Intake/Output, MAR, and Recent Results.

## 2023-11-01 NOTE — CARE COORDINATION
Transition Care Plan:    RUR: Low Risk    - Disposition: Patient elects to return home with support of [address] [phone] has accepted - will need a signed and dated H&P, complete home health orders and discharge summary  - Referral to [place] for Medicaid screen    MINAL: >48 hours    Per Podiatry patient will need debridement wound left foot pending JULIAN's and Xray results. CM to check on recommendation for Medication as patient may need to be screened for personal care services.      Bailee King LCSW

## 2023-11-01 NOTE — PLAN OF CARE
Problem: Discharge Planning  Goal: Discharge to home or other facility with appropriate resources  Outcome: Progressing     Problem: Safety - Adult  Goal: Free from fall injury  11/1/2023 0641 by Lianne Carlisle RN  Outcome: Progressing     Problem: Physical Therapy - Adult  Goal: By Discharge: Performs mobility at highest level of function for planned discharge setting. See evaluation for individualized goals. Description: FUNCTIONAL STATUS PRIOR TO ADMISSION: Patient was modified independent using a rollator for functional mobility. HOME SUPPORT PRIOR TO ADMISSION: The patient lived in assisted living facility but did not require assistance for mobility. Physical Therapy Goals  Initiated 10/31/2023  1. Patient will move from supine to sit and sit to supine  in bed with independence within 7 day(s). 2.  Patient will transfer from bed to chair and chair to bed with independence using the least restrictive device within 7 day(s). 3.  Patient will perform sit to stand with independence within 7 day(s). 4.  Patient will ambulate with independence for 50 feet with the least restrictive device within 7 day(s).        11/1/2023 1452 by Meagan Burroughs PT  Outcome: Progressing

## 2023-11-01 NOTE — PROGRESS NOTES
Orders have been placed to be NPO after midnite tonite     Patient scheduled for surgery/debridement of wound left foot tomorrow, 11/2/23 @ 2pm

## 2023-11-02 ENCOUNTER — ANESTHESIA (OUTPATIENT)
Facility: HOSPITAL | Age: 78
End: 2023-11-02
Payer: MEDICARE

## 2023-11-02 ENCOUNTER — APPOINTMENT (OUTPATIENT)
Facility: HOSPITAL | Age: 78
DRG: 574 | End: 2023-11-02
Payer: MEDICARE

## 2023-11-02 ENCOUNTER — ANESTHESIA EVENT (OUTPATIENT)
Facility: HOSPITAL | Age: 78
End: 2023-11-02
Payer: MEDICARE

## 2023-11-02 LAB
ANION GAP SERPL CALC-SCNC: 8 MMOL/L (ref 5–15)
BACTERIA SPEC CULT: NORMAL
BACTERIA SPEC CULT: NORMAL
BUN SERPL-MCNC: 17 MG/DL (ref 6–20)
BUN/CREAT SERPL: 12 (ref 12–20)
CALCIUM SERPL-MCNC: 8.1 MG/DL (ref 8.5–10.1)
CHLORIDE SERPL-SCNC: 103 MMOL/L (ref 97–108)
CO2 SERPL-SCNC: 27 MMOL/L (ref 21–32)
CREAT SERPL-MCNC: 1.42 MG/DL (ref 0.55–1.02)
ECHO BSA: 1.9 M2
GLUCOSE SERPL-MCNC: 96 MG/DL (ref 65–100)
MAGNESIUM SERPL-MCNC: 1.2 MG/DL (ref 1.6–2.4)
NT PRO BNP: 5258 PG/ML (ref 0–450)
POTASSIUM SERPL-SCNC: 3.5 MMOL/L (ref 3.5–5.1)
SERVICE CMNT-IMP: NORMAL
SERVICE CMNT-IMP: NORMAL
SODIUM SERPL-SCNC: 138 MMOL/L (ref 136–145)
VANCOMYCIN SERPL-MCNC: 26.7 UG/ML
VAS LEFT ARM BP: 124 MMHG
VAS LEFT PTA BP: >240 MMHG
VAS LEFT TBI: 0.72
VAS LEFT TOE PRESSURE: 89 MMHG
VAS RIGHT ABI: 1.41
VAS RIGHT ARM BP: 124 MMHG
VAS RIGHT DORSALIS PEDIS BP: 171 MMHG
VAS RIGHT PTA BP: 175 MMHG
VAS RIGHT TBI: 0.75
VAS RIGHT TOE PRESSURE: 93 MMHG

## 2023-11-02 PROCEDURE — 0HRLXJZ REPLACEMENT OF LEFT LOWER LEG SKIN WITH SYNTHETIC SUBSTITUTE, EXTERNAL APPROACH: ICD-10-PCS | Performed by: PODIATRIST

## 2023-11-02 PROCEDURE — 7100000000 HC PACU RECOVERY - FIRST 15 MIN: Performed by: PODIATRIST

## 2023-11-02 PROCEDURE — 3600000012 HC SURGERY LEVEL 2 ADDTL 15MIN: Performed by: PODIATRIST

## 2023-11-02 PROCEDURE — 83880 ASSAY OF NATRIURETIC PEPTIDE: CPT

## 2023-11-02 PROCEDURE — 6360000002 HC RX W HCPCS: Performed by: INTERNAL MEDICINE

## 2023-11-02 PROCEDURE — 3600000002 HC SURGERY LEVEL 2 BASE: Performed by: PODIATRIST

## 2023-11-02 PROCEDURE — 2709999900 HC NON-CHARGEABLE SUPPLY: Performed by: PODIATRIST

## 2023-11-02 PROCEDURE — 2580000003 HC RX 258: Performed by: INTERNAL MEDICINE

## 2023-11-02 PROCEDURE — 71045 X-RAY EXAM CHEST 1 VIEW: CPT

## 2023-11-02 PROCEDURE — 6370000000 HC RX 637 (ALT 250 FOR IP): Performed by: INTERNAL MEDICINE

## 2023-11-02 PROCEDURE — 3700000001 HC ADD 15 MINUTES (ANESTHESIA): Performed by: PODIATRIST

## 2023-11-02 PROCEDURE — 3700000000 HC ANESTHESIA ATTENDED CARE: Performed by: PODIATRIST

## 2023-11-02 PROCEDURE — 87205 SMEAR GRAM STAIN: CPT

## 2023-11-02 PROCEDURE — 80048 BASIC METABOLIC PNL TOTAL CA: CPT

## 2023-11-02 PROCEDURE — 2500000003 HC RX 250 WO HCPCS: Performed by: PODIATRIST

## 2023-11-02 PROCEDURE — 36415 COLL VENOUS BLD VENIPUNCTURE: CPT

## 2023-11-02 PROCEDURE — 0JBR0ZZ EXCISION OF LEFT FOOT SUBCUTANEOUS TISSUE AND FASCIA, OPEN APPROACH: ICD-10-PCS | Performed by: PODIATRIST

## 2023-11-02 PROCEDURE — 7100000001 HC PACU RECOVERY - ADDTL 15 MIN: Performed by: PODIATRIST

## 2023-11-02 PROCEDURE — 80202 ASSAY OF VANCOMYCIN: CPT

## 2023-11-02 PROCEDURE — 87070 CULTURE OTHR SPECIMN AEROBIC: CPT

## 2023-11-02 PROCEDURE — 6360000002 HC RX W HCPCS: Performed by: ANESTHESIOLOGY

## 2023-11-02 PROCEDURE — 2500000003 HC RX 250 WO HCPCS: Performed by: ANESTHESIOLOGY

## 2023-11-02 PROCEDURE — 97112 NEUROMUSCULAR REEDUCATION: CPT | Performed by: PHYSICAL THERAPIST

## 2023-11-02 PROCEDURE — 83735 ASSAY OF MAGNESIUM: CPT

## 2023-11-02 PROCEDURE — 1200000000 HC SEMI PRIVATE

## 2023-11-02 PROCEDURE — 97530 THERAPEUTIC ACTIVITIES: CPT | Performed by: PHYSICAL THERAPIST

## 2023-11-02 DEVICE — IMPLANTABLE DEVICE: Type: IMPLANTABLE DEVICE | Site: FOOT | Status: FUNCTIONAL

## 2023-11-02 RX ORDER — FUROSEMIDE 10 MG/ML
40 INJECTION INTRAMUSCULAR; INTRAVENOUS DAILY
Status: DISCONTINUED | OUTPATIENT
Start: 2023-11-02 | End: 2023-11-02

## 2023-11-02 RX ORDER — FUROSEMIDE 40 MG/1
40 TABLET ORAL DAILY
Status: DISCONTINUED | OUTPATIENT
Start: 2023-11-03 | End: 2023-11-03

## 2023-11-02 RX ORDER — LIDOCAINE HYDROCHLORIDE 20 MG/ML
INJECTION, SOLUTION EPIDURAL; INFILTRATION; INTRACAUDAL; PERINEURAL PRN
Status: DISCONTINUED | OUTPATIENT
Start: 2023-11-02 | End: 2023-11-02 | Stop reason: SDUPTHER

## 2023-11-02 RX ORDER — MAGNESIUM SULFATE IN WATER 40 MG/ML
2000 INJECTION, SOLUTION INTRAVENOUS ONCE
Status: COMPLETED | OUTPATIENT
Start: 2023-11-02 | End: 2023-11-02

## 2023-11-02 RX ORDER — PROPOFOL 10 MG/ML
INJECTION, EMULSION INTRAVENOUS PRN
Status: DISCONTINUED | OUTPATIENT
Start: 2023-11-02 | End: 2023-11-02 | Stop reason: SDUPTHER

## 2023-11-02 RX ORDER — LIDOCAINE HYDROCHLORIDE 10 MG/ML
INJECTION, SOLUTION EPIDURAL; INFILTRATION; INTRACAUDAL; PERINEURAL PRN
Status: DISCONTINUED | OUTPATIENT
Start: 2023-11-02 | End: 2023-11-02 | Stop reason: ALTCHOICE

## 2023-11-02 RX ADMIN — AMIODARONE HYDROCHLORIDE 200 MG: 200 TABLET ORAL at 10:30

## 2023-11-02 RX ADMIN — PROPOFOL 10 MG: 10 INJECTION, EMULSION INTRAVENOUS at 14:56

## 2023-11-02 RX ADMIN — PROPOFOL 10 MG: 10 INJECTION, EMULSION INTRAVENOUS at 14:59

## 2023-11-02 RX ADMIN — SODIUM CHLORIDE, PRESERVATIVE FREE 10 ML: 5 INJECTION INTRAVENOUS at 20:39

## 2023-11-02 RX ADMIN — CEFEPIME 2000 MG: 2 INJECTION, POWDER, FOR SOLUTION INTRAVENOUS at 01:00

## 2023-11-02 RX ADMIN — SODIUM CHLORIDE, PRESERVATIVE FREE 10 ML: 5 INJECTION INTRAVENOUS at 11:09

## 2023-11-02 RX ADMIN — CEFEPIME 2000 MG: 2 INJECTION, POWDER, FOR SOLUTION INTRAVENOUS at 16:18

## 2023-11-02 RX ADMIN — DULOXETINE 30 MG: 30 CAPSULE, DELAYED RELEASE ORAL at 10:30

## 2023-11-02 RX ADMIN — FUROSEMIDE 40 MG: 10 INJECTION, SOLUTION INTRAMUSCULAR; INTRAVENOUS at 10:32

## 2023-11-02 RX ADMIN — LEVOTHYROXINE SODIUM 50 MCG: 0.05 TABLET ORAL at 10:29

## 2023-11-02 RX ADMIN — PROPOFOL 10 MG: 10 INJECTION, EMULSION INTRAVENOUS at 14:53

## 2023-11-02 RX ADMIN — LIDOCAINE HYDROCHLORIDE 50 MG: 20 INJECTION, SOLUTION EPIDURAL; INFILTRATION; INTRACAUDAL; PERINEURAL at 14:51

## 2023-11-02 RX ADMIN — PROPOFOL 50 MG: 10 INJECTION, EMULSION INTRAVENOUS at 14:51

## 2023-11-02 RX ADMIN — VANCOMYCIN HYDROCHLORIDE 750 MG: 750 INJECTION, POWDER, LYOPHILIZED, FOR SOLUTION INTRAVENOUS at 18:02

## 2023-11-02 RX ADMIN — ENOXAPARIN SODIUM 40 MG: 100 INJECTION SUBCUTANEOUS at 20:39

## 2023-11-02 RX ADMIN — ATORVASTATIN CALCIUM 80 MG: 40 TABLET, FILM COATED ORAL at 20:39

## 2023-11-02 RX ADMIN — MAGNESIUM SULFATE HEPTAHYDRATE 2000 MG: 40 INJECTION, SOLUTION INTRAVENOUS at 10:31

## 2023-11-02 RX ADMIN — ASPIRIN 81 MG 81 MG: 81 TABLET ORAL at 10:29

## 2023-11-02 ASSESSMENT — PAIN - FUNCTIONAL ASSESSMENT
PAIN_FUNCTIONAL_ASSESSMENT: 0-10

## 2023-11-02 ASSESSMENT — PAIN SCALES - GENERAL
PAINLEVEL_OUTOF10: 0
PAINLEVEL_OUTOF10: 0

## 2023-11-02 ASSESSMENT — ENCOUNTER SYMPTOMS
SHORTNESS OF BREATH: 0
COUGH: 0

## 2023-11-02 ASSESSMENT — VISUAL ACUITY: OU: 1

## 2023-11-02 ASSESSMENT — PAIN DESCRIPTION - ORIENTATION: ORIENTATION: LEFT

## 2023-11-02 ASSESSMENT — PAIN DESCRIPTION - LOCATION: LOCATION: FOOT

## 2023-11-02 NOTE — CARE COORDINATION
Transition Care Plan:     RUR: Low Risk      IDR Rounds this am with MD and team.   Continue Plan of care   Plans to OR today - Podiatry- wound debridement       11-2-23     Met with patient in her room today. Discussed disposition again. MD and team concerned with patient going home and needing a little more help than her friend can provide. Discussed with patient.- SNF again -Patient indicates she will consider this - Discussed freedom of choice and provider. She only interested in [facility] on [address]. She wants to stay on this side  of town. Discussed will check with facility and discussed will need to have Pre-Authorization from the insurance co.        11-1-23   - Disposition: Patient elects to return home with support of [address] [phone] has accepted - will need a signed and dated H&P, complete home health orders and discharge summary  - Referral to [address] [place] [address] for Medicaid screen     MINAL: >48 hours     Need Second IMM letter.      [address] MSW RN    [phone]

## 2023-11-02 NOTE — WOUND CARE
WOUND Note:      Follow up for Left foot wound 11/1/23     Chart shows:  Admitted on 10/28/23 for Cellulitis   History of Arthritis; CAD; CKD; HF; HTN; NSTEMI; Ventricular fibrillation; AICD     Assessment:   A&O x 4 and reports no pain   Incontinent of bowel and bladder  Surface: Versacare mattress  WBC- 5.0     Left heel intact and without erythema. Heels offloaded with pillows. Buttocks intact without erythema     1. POA wound to left dorsal foot  5.8 x 3.1 x 0.5 cm  100%  moist, adherent slough tissue. Small serous exudate; pink wound edges. Periwound dry/flaky skin. No malodor or purulence. Tx: Irrigated with saline; applied Dakin's moist to dry dressing, wrapped with rolled gauze and ace bandage. 2. POA DTI to lateral right foot  1.0 x 1.0 x 0.0 cm  Non blanchable erythema; purple/maroon in color. Dry/intact. No exudate. Tx: off loaded heel/foot. Foam dressing C/D/I.              3. POA Stage 2 to Coccyx  Will assess Friday             Wound Recommendations:    Daily to Left Foot wound: Irrigate with saline; Dakin's 0.125% moist to dry gauze; secure with rolled gauze and ace bandage (loosely wrapped)     Every 3 days and as needed:  Cover right lateral Deep tissue injury with foam.     As needed to Coccyx Stage 2 - cleanse with soap and water or moisture wipes; apply zinc ointment     PI Prevention:  Turn/reposition approximately every 2 hours  Offload heels with heels hanging off end of pillow at all times while in bed. Sacral Foam dressing: lift to assess regularly; change as needed. Discontinue if incontinence is frequently soiling dressing. Zinc ointment to buttocks and sacrum daily and as needed with incontinence care  Low Air Loss mattress ordered today. Use only flat sheet and one incontinence pad.        Transition of Care: Plan to follow weekly and as needed while admitted to hospital.       Janes Quijano RN, BSN  Wound Care Nurse, [address]  [phone]

## 2023-11-02 NOTE — PROGRESS NOTES
Problem: Physical Therapy - Adult  Goal: By Discharge: Performs mobility at highest level of function for planned discharge setting. See evaluation for individualized goals. Description: FUNCTIONAL STATUS PRIOR TO ADMISSION: Patient was modified independent using a rollator for functional mobility. HOME SUPPORT PRIOR TO ADMISSION: The patient lived in assisted living facility but did not require assistance for mobility. Physical Therapy Goals  Initiated 10/31/2023  1. Patient will move from supine to sit and sit to supine  in bed with independence within 7 day(s). 2.  Patient will transfer from bed to chair and chair to bed with independence using the least restrictive device within 7 day(s). 3.  Patient will perform sit to stand with independence within 7 day(s). 4.  Patient will ambulate with independence for 50 feet with the least restrictive device within 7 day(s). Outcome: Progressing   PHYSICAL THERAPY TREATMENT    Patient: Delia Fuller (32 y.o. female)  Date: 11/2/2023  Diagnosis: Cellulitis [L03.90]  Cellulitis of left lower extremity [L03.116] Cellulitis  Procedure(s) (LRB):  DEBRIDEMENT OF NECROTIC WOUND LEFT FOOT, POSSIBLE APPLICATION OF ACELLULAR GRAFT (Left)    Precautions: fall risk      ASSESSMENT:  Patient continues to benefit from skilled PT services and is progressing towards goals. Pt received supine in bed with heels elevated. Pt agreeable to PT. Pt performed bed mobility, including rolling and supine to sit with min-mod A. Pt performed sit to stand with min A and participated in weight shifting activities and standing marching. Pt progressing well with POC. PLAN:  Patient continues to benefit from skilled intervention to address the above impairments. Continue treatment per established plan of care. Recommendation for discharge: (in order for the patient to meet his/her long term goals):  Therapy 2 days/week in the home or Therapy up to 5 days/week in Skilled nursing facility    Other factors to consider for discharge: no additional factors    IF patient discharges home will need the following DME: patient owns DME required for discharge       SUBJECTIVE:   Patient stated that she feels \"okay\" today with no new complaints. OBJECTIVE DATA SUMMARY:   Functional Mobility Training:  Bed Mobility:  Bed Mobility Training  Overall Level of Assistance: Minimum assistance; Moderate assistance; Additional time  Interventions: Safety awareness training;Verbal cues; Visual cues; Weight shifting training/pressure relief  Rolling: Contact-guard assistance; Additional time  Supine to Sit: Minimum assistance; Moderate assistance; Other (comment) (HOB Elevated)  Sit to Supine: Contact-guard assistance  Scooting: Minimum assistance  Transfers:  Transfer Training  Transfer Training: Yes  Sit to Stand: Minimum assistance  Stand to Sit: Contact-guard assistance  Balance:  Balance  Sitting: Impaired  Sitting - Static: Good (unsupported)  Sitting - Dynamic: Fair (occasional)  Standing: Impaired  Standing - Static: Constant support;Fair;Poor  Standing - Dynamic: Constant support;Poor; Fair    Neuro Re-Education:  Standing weight shift x 10/side  Standing marching x 8/side    Pain Rating:  No C/o pain    Activity Tolerance:   Fair  and requires frequent rest breaks    After treatment:   Patient left in no apparent distress in bed and Call bell within tru      COMMUNICATION/EDUCATION:   The patient's plan of care was discussed with: physical therapist, registered nurse, physician, and     CLARKE Hebert assisted with treatment of this patient with direct supervision and guidance from Munira Hughes PT, DPT.       CLARKE Her  Minutes: 32

## 2023-11-02 NOTE — PROGRESS NOTES
[facility] Hospitalist Group                                                                               Hospitalist Progress Note  Davonte Segura MD          Date of Service:  2023  NAME:  Arcadio Ariza  :    MRN:  [id number]    Please note that this dictation was completed with Revivio, the computer voice recognition software. Quite often unanticipated grammatical, syntax, homophones, and other interpretive errors are inadvertently transcribed by the computer software. Please disregard these errors. Please excuse any errors that have escaped final proofreading. Admission Summary:   Arcadio Ariza is a 69 y.o. female who presents with with past medical history of CKD, heart failure, CAD presents to the ED department due to left lower extremity wound. Patient originally went to [facility] due to lower extremity swelling and a cut in her left foot. Her left foot was wrapped in gauze and she was discharged on 10/24/2023. She endorsed that wound care nurse was supposed to come each day and unwrap the wound but they never arrived. Patient lives in a senior independent facility and was supposed to receive home health care twice a week as well. On 10/28/2023 patient's foot was unwrapped and it was noted to be draining purulent fluid with the addition of maggots. She did arrive to ED nontoxic afebrile, and hemodynamically stable. On exam patient's lower extremities were pitting +3 however patient was not in acute exacerbation . Significant lab results include WBC within normal limits, , creatinine 2.28, BUN 3 4, , . Patient admitted for wound care and parenteral antibiotic therapy for the treatment of cellulitis. Interval history / Subjective:   Patient is scheduled for left foot wound debridement today.      Assessment & Plan:     Anticipated discharge date : TBD  Anticipated disposition : TBD  Barriers to discharge :        #Left foot coordination discussions were held with appropriate clinical/nonclinical/ nursing providers based on care coordination needs. Patients current active Medications were reviewed, considered, added and adjusted based on the clinical condition today. Home Medications were reconciled to the best of my ability given all available resources at the time of admission. Route is PO if not otherwise noted.       Admission Status:[id number]:::1}      Medications Reviewed:     Current Facility-Administered Medications   Medication Dose Route Frequency    magnesium sulfate 2000 mg in 50 mL IVPB premix  2,000 mg IntraVENous Once    furosemide (LASIX) injection 40 mg  40 mg IntraVENous Daily    DULoxetine (CYMBALTA) extended release capsule 30 mg  30 mg Oral Daily    levothyroxine (SYNTHROID) tablet 50 mcg  50 mcg Oral Daily    sodium hypochlorite (DAKINS) 0.125 % external solution   Irrigation BID    vancomycin (VANCOCIN) 750 mg in sodium chloride 0.9 % 250 mL IVPB (vial-mate)  750 mg IntraVENous Q24H    enoxaparin (LOVENOX) injection 40 mg  40 mg SubCUTAneous Daily    ceFEPIme (MAXIPIME) 2,000 mg in sodium chloride 0.9 % 100 mL IVPB (mini-bag)  2,000 mg IntraVENous Q12H    sodium chloride flush 0.9 % injection 5-40 mL  5-40 mL IntraVENous 2 times per day    sodium chloride flush 0.9 % injection 5-40 mL  5-40 mL IntraVENous PRN    0.9 % sodium chloride infusion   IntraVENous PRN    ondansetron (ZOFRAN-ODT) disintegrating tablet 4 mg  4 mg Oral Q8H PRN    Or    ondansetron (ZOFRAN) injection 4 mg  4 mg IntraVENous Q6H PRN    polyethylene glycol (GLYCOLAX) packet 17 g  17 g Oral Daily PRN    acetaminophen (TYLENOL) tablet 650 mg  650 mg Oral Q6H PRN    Or    acetaminophen (TYLENOL) suppository 650 mg  650 mg Rectal Q6H PRN    [Held by provider] empagliflozin (JARDIANCE) tablet 10 mg  10 mg Oral Daily    amiodarone (CORDARONE) tablet 200 mg  200 mg Oral Daily    aspirin chewable tablet 81 mg  81 mg Oral Daily    atorvastatin

## 2023-11-02 NOTE — PROGRESS NOTES
Physician Progress Note      Satnam Mata  CSN #:                  [id number]  :                       1945  ADMIT DATE:       10/28/2023 12:21 PM  [address] DATE:  RESPONDING  PROVIDER #:        Severino Thomas MD          QUERY TEXT:      Dear attending,    Noted documentation in the progress notes of 10/29 of HFpEF-no exacerbation   but progress notes also stated decompensated, lower ext edema +3. Labs   included 10/28-NT pro-BNP 1254 and 10/31-NT pro-BNP 4157. If possible, please document in progress notes and discharge summary if you   are evaluating and /or treating any of the following: The medical record reflects the following:  Risk Factors: hx. heart failure, CAD    Clinical Indicators: 10/28-NT pro-BNP 1254, 10/31-NT pro-BNP 4157  10/29-Per PN- HFpEF-no exacerbation  ECHO 10/23:EF 55-60% 10/23 with aortic regurg  NYHA 3  decompensated, lower ext edema +3  Treatment: fluid restriction, strict I&O, IV Lasix 20 mg x 1 on 10/29, monitor   labwork      Olinda Jimenez RN, BSN, CRCR, CCDS  Clinical Documentation Improvement  [phone] or via Perfect Serve  Options provided:  -- Acute on Chronic Diastolic CHF/HFpEF  -- Chronic Diastolic CHF/HFpEF  -- Other - I will add my own diagnosis  -- Disagree - Not applicable / Not valid  -- Disagree - Clinically unable to determine / Unknown  -- Refer to Clinical Documentation Reviewer    PROVIDER RESPONSE TEXT:    This patient has chronic diastolic CHF/HFpEF. Query created by: Matthew Du on 10/31/2023 1:35 PM      QUERY TEXT:      Dear attending,    Per wound care nurse notes of 10/30/23 the patient was noted to also have a   POA Stage 2 to Coccyx pressure ulcer. If possible, please document in progress notes and discharge summary the   location and present on admission status of the pressure ulcer:     The medical record reflects the following:  Risk Factors: Incontinent of bowel and bladder    Clinical Indicators:  10/30-Wound care-  POA Stage 2 to Coccyx  1.5 x 1.5  x  0.2 cm  Scant serousang exudate; periwound indurated. No malodor or purulence - no   gross S&S of infection  Periwound & with slight erythema    Treatment: Wound care consult, monitor wound    Stage 1:  Non-blanchable erythema of intact skin  Stage 2:  Abrasion, Blister, Partial-thickness skin loss, with exposed dermis  Stage 3:  Full-thickness skin loss with damage or necrosis of subcutaneous   tissue  Stage 4:  Full-thickness skin & soft tissue loss through to underlying muscle,   tendon or bone  Unstageable: Obscured full-thickness skin & tissue loss      Olinda Jimenez RN, BSN, CRCR, CCDS  Clinical Documentation Improvement  [phone] or via Perfect Serve  Options provided:  -- Stage 2 Pressure Ulcer of coccyx present on admission  -- Pressure Ulcer of coccyx present on admission  -- Other - I will add my own diagnosis  -- Disagree - Not applicable / Not valid  -- Disagree - Clinically unable to determine / Unknown  -- Refer to Clinical Documentation Reviewer    PROVIDER RESPONSE TEXT:    This patient has a stage 2 pressure ulcer of the coccyx which was present on   admission.     Query created by: Pauline Houston on 11/2/2023 8:16 AM      Electronically signed by:  Beulah Fuentes MD 11/2/2023 9:02 AM

## 2023-11-02 NOTE — PROGRESS NOTES
Spoke to patient's sister in regards to patient's status post surgery. Sister stresses how her sister needs rehab and she thinks her sister will benefit from it.

## 2023-11-02 NOTE — PROGRESS NOTES
Post excisional debridement of wound left foot and application of skin substitute. Transferred back to the floor . Alert with all vitals stable. Discharge pending wound culture results.     Wound care and case mangement orders have been placed

## 2023-11-02 NOTE — PROGRESS NOTES
1910) Bedside and Verbal shift change report given to [address] [place] [address] (oncoming nurse) by Kelsey Fischer (offgoing nurse). Report included the following information SBAR, Kardex, Intake/Output, MAR and Recent Results. 2003) PM assessment done. Patient is alert   and oriented x 4. VS taken. Has left foot wound with dressing in place. No additional needs at this time. Call light within reach. 2011) Scheduled bedtime medications given. Water pitcher refilled. Selam) RN rounded on patient. Patient is sleeping comfortably. 0100) Scheduled Cefepime 2000 mg in 0.9% sodium chloride 100 ml hanged and infusing.     0200) RN rounded on patient. Patient is sleeping comfortably. 0403) Blood work obtained for AM labs and sent to the lab.     0544) Patient was reassessed. Vital signs were taken. No new changes at this time. 46) Hygiene care provided. CHG bath given. New bed linens, gown, bed pad, brief provided. 0579) Bedside and Verbal shift change report given to [address] (oncoming nurse) by [address] [place] [address] (offgoing nurse). Report included the following information SBAR, Kardex, Intake/Output, MAR and Recent Results.

## 2023-11-02 NOTE — PERIOP NOTE
Taylor Stein  1945  [id number]    Situation:  Verbal report given from: Dr. Sherry Wright and Anika Marina RN  Procedure: Procedure(s):  DEBRIDEMENT OF NECROTIC WOUND LEFT FOOT, POSSIBLE APPLICATION OF ACELLULAR GRAFT    Background:    Preoperative diagnosis: Open wound of left foot, initial encounter [S91.302A]    Postoperative diagnosis: * No post-op diagnosis entered *    :  Dr. Remigio Washington    Assistant(s): Scrub Person First: Pamela Espinoza  Perioperative Nurse: Lalo Carranza RN    Specimens: * No specimens in log *    Assessment:  Intra-procedure medications         Anesthesia gave intra-procedure sedation and medications, see anesthesia flow sheet     Intravenous fluids: LR@ KVO     Vital signs stable

## 2023-11-02 NOTE — ANESTHESIA POSTPROCEDURE EVALUATION
Department of Anesthesiology  Postprocedure Note    Patient: Sera Beltran  MRN: [id number]  YOB: 1945  Date of evaluation: 11/2/2023      Procedure Summary     Date: 11/02/23 Room / Location: [id number] [place] [address] / Advisor Client [facility] MAIN OR    Anesthesia Start: 5428 Anesthesia Stop: 0589    Procedure: [address] LEFT FOOT, POSSIBLE APPLICATION OF ACELLULAR GRAFT (Left: Foot) Diagnosis:       Open wound of left foot, initial encounter      (Open wound of left foot, initial encounter [S91.302A])    Surgeons: Chaz Sánchez DPM Responsible Provider: Sanju Alvarado MD    Anesthesia Type: MAC ASA Status: 3          Anesthesia Type: No value filed. Arnie Phase I: Arnie Score: 10    Arnie Phase II:        Anesthesia Post Evaluation    Patient location during evaluation: PACU  Patient participation: complete - patient participated  Level of consciousness: awake and alert  Airway patency: patent  Nausea & Vomiting: no vomiting and no nausea  Complications: no  Cardiovascular status: hemodynamically stable  Respiratory status: acceptable  Hydration status: stable  There was medical reason for not using a multimodal analgesia pain management approach. Pain management: adequate

## 2023-11-02 NOTE — ADDENDUM NOTE
Addendum  created 11/02/23 1532 by aKron Strauss MD    Intraprocedure Event edited, Intraprocedure Staff edited

## 2023-11-02 NOTE — PERIOP NOTE
TRANSFER - OUT REPORT:    Verbal report given to Carlos Gibson on Christina Matthews  being transferred to [facility] [phone] for routine post-op       Report consisted of patient's Situation, Background, Assessment and   Recommendations(SBAR). Information from the following report(s) Nurse Handoff Report was reviewed with the receiving nurse. Lines:   Peripheral IV 10/28/23 Left Antecubital (Active)   Site Assessment Clean, dry & intact 11/02/23 1515   Line Status Infusing 11/02/23 [place] Connections checked and tightened 11/02/23 1358   Phlebitis Assessment No symptoms 11/02/23 1515   Infiltration Assessment 0 11/02/23 1515   Alcohol Cap Used No 11/02/23 1515   Dressing Status Clean, dry & intact 11/02/23 1515   Dressing Type Transparent 11/02/23 1515        Opportunity for questions and clarification was provided.       Patient transported with:  Registered Nurse

## 2023-11-03 ENCOUNTER — APPOINTMENT (OUTPATIENT)
Facility: HOSPITAL | Age: 78
DRG: 574 | End: 2023-11-03
Payer: MEDICARE

## 2023-11-03 LAB
ANION GAP SERPL CALC-SCNC: 8 MMOL/L (ref 5–15)
BACTERIA SPEC CULT: NORMAL
BUN SERPL-MCNC: 17 MG/DL (ref 6–20)
BUN/CREAT SERPL: 12 (ref 12–20)
CALCIUM SERPL-MCNC: 8.4 MG/DL (ref 8.5–10.1)
CHLORIDE SERPL-SCNC: 101 MMOL/L (ref 97–108)
CO2 SERPL-SCNC: 27 MMOL/L (ref 21–32)
CREAT SERPL-MCNC: 1.44 MG/DL (ref 0.55–1.02)
GLUCOSE SERPL-MCNC: 86 MG/DL (ref 65–100)
MAGNESIUM SERPL-MCNC: 1.7 MG/DL (ref 1.6–2.4)
NT PRO BNP: 3245 PG/ML (ref 0–450)
PHOSPHATE SERPL-MCNC: 2.5 MG/DL (ref 2.6–4.7)
POTASSIUM SERPL-SCNC: 3.4 MMOL/L (ref 3.5–5.1)
SERVICE CMNT-IMP: NORMAL
SODIUM SERPL-SCNC: 136 MMOL/L (ref 136–145)

## 2023-11-03 PROCEDURE — 80048 BASIC METABOLIC PNL TOTAL CA: CPT

## 2023-11-03 PROCEDURE — 6370000000 HC RX 637 (ALT 250 FOR IP): Performed by: INTERNAL MEDICINE

## 2023-11-03 PROCEDURE — 71045 X-RAY EXAM CHEST 1 VIEW: CPT

## 2023-11-03 PROCEDURE — 2580000003 HC RX 258: Performed by: INTERNAL MEDICINE

## 2023-11-03 PROCEDURE — 6370000000 HC RX 637 (ALT 250 FOR IP): Performed by: HOSPITALIST

## 2023-11-03 PROCEDURE — 6360000002 HC RX W HCPCS: Performed by: INTERNAL MEDICINE

## 2023-11-03 PROCEDURE — 6370000000 HC RX 637 (ALT 250 FOR IP): Performed by: NURSE PRACTITIONER

## 2023-11-03 PROCEDURE — 36415 COLL VENOUS BLD VENIPUNCTURE: CPT

## 2023-11-03 PROCEDURE — 97530 THERAPEUTIC ACTIVITIES: CPT | Performed by: PHYSICAL THERAPIST

## 2023-11-03 PROCEDURE — 1200000000 HC SEMI PRIVATE

## 2023-11-03 PROCEDURE — 83735 ASSAY OF MAGNESIUM: CPT

## 2023-11-03 PROCEDURE — 83880 ASSAY OF NATRIURETIC PEPTIDE: CPT

## 2023-11-03 PROCEDURE — 84100 ASSAY OF PHOSPHORUS: CPT

## 2023-11-03 RX ORDER — FUROSEMIDE 20 MG/1
20 TABLET ORAL DAILY
Status: DISCONTINUED | OUTPATIENT
Start: 2023-11-04 | End: 2023-11-04 | Stop reason: HOSPADM

## 2023-11-03 RX ORDER — POTASSIUM CHLORIDE 750 MG/1
20 TABLET, FILM COATED, EXTENDED RELEASE ORAL ONCE
Status: COMPLETED | OUTPATIENT
Start: 2023-11-03 | End: 2023-11-03

## 2023-11-03 RX ORDER — POTASSIUM CHLORIDE 750 MG/1
40 TABLET, FILM COATED, EXTENDED RELEASE ORAL ONCE
Status: COMPLETED | OUTPATIENT
Start: 2023-11-03 | End: 2023-11-03

## 2023-11-03 RX ORDER — FLUCONAZOLE 200 MG/1
100 TABLET ORAL DAILY
Status: DISCONTINUED | OUTPATIENT
Start: 2023-11-03 | End: 2023-11-04 | Stop reason: HOSPADM

## 2023-11-03 RX ADMIN — ASPIRIN 81 MG 81 MG: 81 TABLET ORAL at 10:28

## 2023-11-03 RX ADMIN — DULOXETINE 30 MG: 30 CAPSULE, DELAYED RELEASE ORAL at 10:27

## 2023-11-03 RX ADMIN — FLUCONAZOLE 100 MG: 200 TABLET ORAL at 14:07

## 2023-11-03 RX ADMIN — CEFEPIME 2000 MG: 2 INJECTION, POWDER, FOR SOLUTION INTRAVENOUS at 04:30

## 2023-11-03 RX ADMIN — SODIUM CHLORIDE 750 MG: 9 INJECTION, SOLUTION INTRAVENOUS at 18:54

## 2023-11-03 RX ADMIN — AMIODARONE HYDROCHLORIDE 200 MG: 200 TABLET ORAL at 10:27

## 2023-11-03 RX ADMIN — ENOXAPARIN SODIUM 40 MG: 100 INJECTION SUBCUTANEOUS at 20:29

## 2023-11-03 RX ADMIN — LEVOTHYROXINE SODIUM 50 MCG: 0.05 TABLET ORAL at 10:27

## 2023-11-03 RX ADMIN — SODIUM CHLORIDE, PRESERVATIVE FREE 10 ML: 5 INJECTION INTRAVENOUS at 20:30

## 2023-11-03 RX ADMIN — ATORVASTATIN CALCIUM 80 MG: 40 TABLET, FILM COATED ORAL at 20:29

## 2023-11-03 RX ADMIN — SODIUM CHLORIDE, PRESERVATIVE FREE 10 ML: 5 INJECTION INTRAVENOUS at 17:59

## 2023-11-03 RX ADMIN — FUROSEMIDE 40 MG: 40 TABLET ORAL at 10:27

## 2023-11-03 RX ADMIN — ACETAMINOPHEN 650 MG: 325 TABLET ORAL at 14:04

## 2023-11-03 RX ADMIN — POTASSIUM CHLORIDE 20 MEQ: 750 TABLET, EXTENDED RELEASE ORAL at 06:20

## 2023-11-03 RX ADMIN — CEFEPIME 2000 MG: 2 INJECTION, POWDER, FOR SOLUTION INTRAVENOUS at 16:59

## 2023-11-03 RX ADMIN — POTASSIUM CHLORIDE 40 MEQ: 750 TABLET, EXTENDED RELEASE ORAL at 10:27

## 2023-11-03 RX ADMIN — SODIUM HYPOCHLORITE: 1.25 SOLUTION TOPICAL at 18:00

## 2023-11-03 ASSESSMENT — PAIN DESCRIPTION - ORIENTATION: ORIENTATION: RIGHT;LEFT

## 2023-11-03 ASSESSMENT — PAIN DESCRIPTION - LOCATION: LOCATION: LEG

## 2023-11-03 ASSESSMENT — ENCOUNTER SYMPTOMS
SHORTNESS OF BREATH: 0
CHOKING: 0
COUGH: 0

## 2023-11-03 ASSESSMENT — PAIN DESCRIPTION - DESCRIPTORS: DESCRIPTORS: ACHING

## 2023-11-03 ASSESSMENT — PAIN SCALES - GENERAL
PAINLEVEL_OUTOF10: 0
PAINLEVEL_OUTOF10: 3

## 2023-11-03 ASSESSMENT — VISUAL ACUITY: OU: 1

## 2023-11-03 NOTE — WOUND CARE
WOUND Note:      Follow up for Left foot wound 11/3/23     Chart shows:  Admitted on 10/28/23 for Cellulitis   History of Arthritis; CAD; CKD; HF; HTN; NSTEMI; Ventricular fibrillation; AICD    11/2/23 -Excisional debridement and wound bed prep of an open wound dorsal aspect left foot followed by the application of a skin substitute (PuraPly wound matrix). Per Dr. Ralph Zapata:   A&O x 4 and reports no pain   Incontinent of bowel and bladder  Surface: Pro + Surface Mattress  WBC- 5.0     Left heel intact and without erythema. Heels offloaded with pillows. Buttocks intact without erythema     1. POA surgical debridement to left dorsal foot 11/2/23  Wound bed with adaptic dressing intact. Steri-strips around adaptic keeping it secure. Small amount of dry serosang  Exudate. Periwound intact. Adaptic is clean/intact. Tx: Irrigated with saline; applied hydrogel,  covered with dry gauze; secured with kerlix and ace bandage. Heels offloaded on pillows. 2. POA DTI to lateral right foot  Non blanchable erythema; purple/maroon in color. Dry/intact. No exudate. Tx: off loaded heel/foot              3. POA Stage 2 to Coccyx  Area remains the same. Continue to use zinc after pericare. Wound Recommendations:    Wound care left foot:   Leaving contact layer intact, flush with saline; apply hydrogel to the contact adpatic layer; cover with gauze; kerlix; outer ace   Change every other day. Per Dr. Paul Causey     Every 3 days and as needed:  Cover right lateral Deep tissue injury with foam.     As needed to Coccyx Stage 2 - cleanse with soap and water or moisture wipes; apply zinc ointment     PI Prevention:  Turn/reposition approximately every 2 hours  Offload heels with heels hanging off end of pillow at all times while in bed. Sacral Foam dressing: lift to assess regularly; change as needed. Discontinue if incontinence is frequently soiling dressing.      Zinc ointment to buttocks and sacrum daily and as needed with incontinence care  Low Air Loss mattress ordered today. Use only flat sheet and one incontinence pad.        Transition of Care: Plan to follow weekly and as needed while admitted to hospital.       Yannick Santana RN, BSN  Wound Care Nurse, [facility]  [phone]

## 2023-11-03 NOTE — PROGRESS NOTES
Date of  Admission: 10/28/2023 12:21 PM  Primary Care Physician: Eladio Corrales MD     Jackelyn Tuttle is a 69 y.o. female admitted for Cellulitis [L03.90]  Cellulitis of left lower extremity [L03.116]    HPI:   This is a 27-year-old female with an extensive past medical history, including CKD stage III, CAD (s/p CABG x2), V. tach/V-fib s/p ICD placement, mitral regurgitation s/p mitral annuloplasty, moderate to severe AI, HFrEF, and carotid artery stenosis who is currently admitted under the hospital medical services for left lower extremity foot wound. Patient was recently admitted to [facility] for lower extremity foot wound. During that hospitalization, she also became quite hypotensive (70s/40s) and had a lactic acidosis. She was ultimately discharged home, but continued to have worsening symptoms of LLE infection which prompted her visit to [facility] on 10/28/23. An echocardiogram has been completed which shows mild to moderate mitral stenosis (with mean gradient of 6 mmHg), normal EF, and moderate AI which appears slightly improved from her recent echocardiogram at [facility]. She has had uptrending serum proBNP's without any subjective complaints of increased dyspnea. She is planning to undergo debridement of the left lower extremity this afternoon with podiatry. On exam, patient appears well compensated and has no active cardiopulmonary complaints. She is a very poor historian. She apparently lives in an assisted living facility and has no difficulty completing her ADLs/IADLs. She denies any notable exertional symptoms, including palpitations, presyncope, dyspnea, or chest pain. She was apparently quite edematous and at the time of my evaluation on 11/2 she has no pitting edema in the lower extremities. She does have some bibasilar rales that could represent pulmonary edema or atelectasis.   She was mildly hypotensive and she is currently not on any workup / interventions at this time. We will follow from a distance or as needed for any new concerns. Please reach out via Perfectserve with any questions / concerns regarding her care plan.       WAQAS Coffey NP

## 2023-11-03 NOTE — CARE COORDINATION
GAMA   RUR  13 %     IDR Rounds again this am with MD and team   MINAL 24-48 hours. Received call from [facility] -  They have obtain Authorization and can accept patient. Even over the weekend. Discussed with MD and he will follow-up with Podiatry for next steps. Hayde Couch MSW RN   [phone]       Addendum: 5:45pM     Met with patient and discussed the above again  Patient still in agreement for SNF- she indicates she will go for a few days- 5 days. To get some extra care than she wants to go home.      [address] MSW RN    [phone]

## 2023-11-03 NOTE — PROGRESS NOTES
1915) Bedside and Verbal shift change report given to [address] [place] [address] (oncoming nurse) by Nadir Alfred (offgoing nurse). Report included the following information SBAR, Kardex, Intake/Output, MAR and Recent Results. 2000) PM assessment done. Patient is alert   and oriented x 4. VS taken. Has left foot wound with dressing in place. No additional needs at this time. Call light within reach. 2039) Scheduled bedtime medications given. Water pitcher refilled. 2315) CXR result in. Patient has atelectasis. Patient was educated on the use of incentive spirometer. 7023) RN rounded on patient. Patient is resting comfortably. 0430) Scheduled Cefepime 2000 mg in 0.9% sodium chloride 100 ml hanged and infusing. 5444) Patient was reassessed. Vital signs were taken. No new changes at this time. 2997) Blood work obtained for AM labs and sent to the lab.     0559) Hospitalist notified of patient's AM labs results:potassium-3.4, creatinine-1.44, BNP-3,245.    0600) Hygiene care provided. New bed linens, bed pad, brief provided. 3815) Scheduled Potassium 20 mEq tablet given for potassium level of 3.4.    0729) Bedside and Verbal shift change report given to MARILYN Hughes (oncoming nurse) by [address] [place] [address] (offgoing nurse). Report included the following information SBAR, Kardex, Intake/Output, MAR and Recent Results.

## 2023-11-03 NOTE — PROGRESS NOTES
Xray shows atelectasis. Encourage spirometer and out of bed ambulating as tolerated by ability to bear weight on LLE. BP a little bit soft. Appears euvolemic.   Switching from IV to PO lasix starting tomorrow am.

## 2023-11-03 NOTE — PROGRESS NOTES
Problem: Physical Therapy - Adult  Goal: By Discharge: Performs mobility at highest level of function for planned discharge setting. See evaluation for individualized goals. Description: FUNCTIONAL STATUS PRIOR TO ADMISSION: Patient was modified independent using a rollator for functional mobility. HOME SUPPORT PRIOR TO ADMISSION: The patient lived in assisted living facility but did not require assistance for mobility. Physical Therapy Goals  Initiated 10/31/2023  1. Patient will move from supine to sit and sit to supine  in bed with independence within 7 day(s). 2.  Patient will transfer from bed to chair and chair to bed with independence using the least restrictive device within 7 day(s). 3.  Patient will perform sit to stand with independence within 7 day(s). 4.  Patient will ambulate with independence for 50 feet with the least restrictive device within 7 day(s). Outcome: Progressing   PHYSICAL THERAPY TREATMENT    Patient: Sera Beltran (89 y.o. female)  Date: 11/3/2023  Diagnosis: Cellulitis [L03.90]  Cellulitis of left lower extremity [L03.116] Cellulitis  Procedure(s) (LRB):  DEBRIDEMENT OF NECROTIC WOUND LEFT FOOT, POSSIBLE APPLICATION OF ACELLULAR GRAFT (Left) 1 Day Post-Op  Precautions:                      ASSESSMENT:  Patient continues to benefit from skilled PT services and is progressing towards goals. Patient required decreased level of assistance for bed mobility and bed <-> chair transfer. Patient continues to require verbal and tactile cues to maintain safety during mobility tasks. Patient will benefit from SNF level care upon discharge to assist with mobility and wound care needs. PLAN:  Patient continues to benefit from skilled intervention to address the above impairments. Continue treatment per established plan of care. Recommendation for discharge: (in order for the patient to meet his/her long term goals):  Therapy up to 5 days/week in Skilled nursing

## 2023-11-03 NOTE — CARE COORDINATION
GAMA     RUR  12%       See CM previous Note     Patient now agrees to SNF   Information sent to [facility].  Interested- to follow-up in am to see if they can obtain Auth. See orders from Dr. Romero Concepcion for [facility] for [facility] and [address] [place] [address]. UAI done and sent to MD to sign.   Referral sent to [facility] for Medicaid Application    [facility] ANNAMARIA RN     [phone]

## 2023-11-03 NOTE — OP NOTE
[facility]  OPERATIVE REPORT    Name:  Hilario Tavares  MR#:  [id number]  :  1945  ACCOUNT #:  [de-identified]  DATE OF SERVICE:  2023    PREOPERATIVE DIAGNOSIS:  Open wound, left foot. POSTOPERATIVE DIAGNOSIS:  Open wound, left foot. PROCEDURE PERFORMED:  Excisional debridement and wound bed prep of an open wound dorsal aspect left foot followed by the application of a skin substitute (PuraPly wound matrix). SURGEON:  Roxie Causey DPM    ASSISTANT:  NONE    ANESTHESIA:  IV sedation with local.    COMPLICATIONS: NONE    SPECIMENS REMOVED:  Wound cultures. IMPLANTS:  NONE    ESTIMATED BLOOD LOSS:  Minimal.    CHIEF COMPLAINT:  This 76-year-old black female presents with an open wound of unknown duration, left foot. The patient presented through the ER on 10/28/2023. She has no complaints of pain. At this time, surgical intervention has been opted as the treatment of choice. Medical history and physical has been performed. The patient is released to surgery. .    Physical examination revealed barely palpable pedal pulses, diminished hair growth lower extremities. Good muscle strength. Diminished epicritic sensation. Open necrotic exudative wound, dorsolateral foot with serous drainage. No pain, no odor, measures 3.5 x 3 x 0.3 cm. The patient is afebrile. White blood cell 5.0. MRI and x-ray taken did not show any evidence of osteomyelitis, abscess on the left foot. ABIs show findings characteristic of arterial stenosis lower extremities bilaterally. DESCRIPTION OF PROCEDURE:  The patient was brought to the operating room and placed on the operating table in the supine position. Under the influence of IV sedation, anesthesia was achieved to the left foot using 1% Xylocaine with epinephrine. Left foot was now prepped and draped in the usual sterile manner and a successful time-out was completed.     Next, using a curette, an excisional debridement was performed of the wound through the subcutaneous layer and removing fascial tissue. The subcutaneous tissue, there is significant amount of exudative necrotic tissue. A good moderate bleeding base was achieved. Aerobic as well as anaerobic wound cultures were taken. The wound was now flushed with copious amounts of saline and a 6 x 9 cm portion of PuraPly wound matrix was placed on the wound was anchored using Steri-Strips, moistened with saline followed by Adaptic which was also anchored with Steri-Strips followed by hydrogel, saline-moistened gauze, dry gauze, Kerlix and an outer Ace. Post-debridement measurements were 5 x 3.3 x 0.5 cm. The patient appeared to tolerate anesthesia and procedure well. She was transferred from the operating room to recovery with all vital signs stable and vascular status apparently intact, left foot. The patient will be discharged pending intraoperative wound cultures.   Orders have been placed for case management to arrange upon discharge [facility] to change dressings three times a week, plan to have vascular consult, and plan to follow with myself, Dr. Nilson Holliday in 2 weeks post discharge at the [address] [place] [address] 95 at [address], DPM      [id number]  D:  11/02/2023 15:44  T:  11/03/2023 3:58  JOB #:  [id number]

## 2023-11-04 VITALS
BODY MASS INDEX: 33.19 KG/M2 | HEIGHT: 63 IN | TEMPERATURE: 97.9 F | RESPIRATION RATE: 17 BRPM | WEIGHT: 187.3 LBS | SYSTOLIC BLOOD PRESSURE: 171 MMHG | OXYGEN SATURATION: 100 % | DIASTOLIC BLOOD PRESSURE: 65 MMHG | HEART RATE: 87 BPM

## 2023-11-04 LAB — CREAT SERPL-MCNC: 1.47 MG/DL (ref 0.55–1.02)

## 2023-11-04 PROCEDURE — 36415 COLL VENOUS BLD VENIPUNCTURE: CPT

## 2023-11-04 PROCEDURE — 82565 ASSAY OF CREATININE: CPT

## 2023-11-04 PROCEDURE — 6370000000 HC RX 637 (ALT 250 FOR IP): Performed by: INTERNAL MEDICINE

## 2023-11-04 PROCEDURE — 2580000003 HC RX 258: Performed by: INTERNAL MEDICINE

## 2023-11-04 PROCEDURE — 6360000002 HC RX W HCPCS: Performed by: INTERNAL MEDICINE

## 2023-11-04 RX ORDER — FUROSEMIDE 20 MG/1
20 TABLET ORAL DAILY
Qty: 10 TABLET | Refills: 0 | Status: SHIPPED | OUTPATIENT
Start: 2023-11-05

## 2023-11-04 RX ORDER — LEVOTHYROXINE SODIUM 0.05 MG/1
50 TABLET ORAL DAILY
Qty: 10 TABLET | Refills: 0 | Status: SHIPPED | OUTPATIENT
Start: 2023-11-05

## 2023-11-04 RX ORDER — FLUCONAZOLE 100 MG/1
100 TABLET ORAL DAILY
Qty: 1 TABLET | Refills: 0 | Status: SHIPPED | OUTPATIENT
Start: 2023-11-05 | End: 2023-11-06

## 2023-11-04 RX ORDER — DOXYCYCLINE HYCLATE 100 MG
100 TABLET ORAL 2 TIMES DAILY
Qty: 28 TABLET | Refills: 0 | Status: SHIPPED | OUTPATIENT
Start: 2023-11-04 | End: 2023-11-18

## 2023-11-04 RX ORDER — ATORVASTATIN CALCIUM 80 MG/1
80 TABLET, FILM COATED ORAL NIGHTLY
Qty: 10 TABLET | Refills: 0 | Status: SHIPPED | OUTPATIENT
Start: 2023-11-04

## 2023-11-04 RX ORDER — SODIUM HYPOCHLORITE 1.25 MG/ML
SOLUTION TOPICAL 2 TIMES DAILY
Qty: 473 ML | Refills: 0 | Status: SHIPPED | OUTPATIENT
Start: 2023-11-04

## 2023-11-04 RX ADMIN — LEVOTHYROXINE SODIUM 50 MCG: 0.05 TABLET ORAL at 09:10

## 2023-11-04 RX ADMIN — FLUCONAZOLE 100 MG: 200 TABLET ORAL at 09:10

## 2023-11-04 RX ADMIN — METOPROLOL TARTRATE 12.5 MG: 25 TABLET, FILM COATED ORAL at 09:10

## 2023-11-04 RX ADMIN — EMPAGLIFLOZIN 10 MG: 10 TABLET, FILM COATED ORAL at 09:10

## 2023-11-04 RX ADMIN — CEFEPIME 2000 MG: 2 INJECTION, POWDER, FOR SOLUTION INTRAVENOUS at 04:11

## 2023-11-04 RX ADMIN — AMIODARONE HYDROCHLORIDE 200 MG: 200 TABLET ORAL at 09:11

## 2023-11-04 RX ADMIN — FUROSEMIDE 20 MG: 20 TABLET ORAL at 09:10

## 2023-11-04 RX ADMIN — ASPIRIN 81 MG 81 MG: 81 TABLET ORAL at 09:10

## 2023-11-04 RX ADMIN — SODIUM CHLORIDE, PRESERVATIVE FREE 10 ML: 5 INJECTION INTRAVENOUS at 09:11

## 2023-11-04 RX ADMIN — DULOXETINE 30 MG: 30 CAPSULE, DELAYED RELEASE ORAL at 09:10

## 2023-11-04 NOTE — DISCHARGE SUMMARY
Discharge Summary   Please note that this dictation was completed with Evoz, the computer voice recognition software. Quite often unanticipated grammatical, syntax, homophones, and other interpretive errors are inadvertently transcribed by the computer software. Please disregard these errors. Please excuse any errors that have escaped final proofreading. PATIENT ID: Sera Beltran  MRN: [id number]   YOB: 1945    DATE OF ADMISSION: 10/28/2023 12:21 PM    DATE OF DISCHARGE: 11/4/2023  PRIMARY CARE PROVIDER: Angela Luther MD         ATTENDING PHYSICIAN: Linda Rich MD  DISCHARGING PROVIDER: Linda Rich MD       CONSULTATIONS: IP WOUND CARE NURSE CONSULT TO EVAL  IP CONSULT TO PHARMACY  IP CONSULT TO DIETITIAN  IP WOUND CARE NURSE CONSULT TO EVAL  IP CONSULT TO PODIATRY  IP CONSULT TO PHYSICAL THERAPY  IP CONSULT TO PODIATRY  IP CONSULT TO CARDIOLOGY  IP WOUND CARE NURSE CONSULT TO EVAL  IP CONSULT TO PHYSICAL THERAPY  IP WOUND CARE NURSE CONSULT TO EVAL  IP CONSULT TO CASE MANAGEMENT    PROCEDURES/SURGERIES: Procedure(s):  DEBRIDEMENT OF NECROTIC WOUND LEFT FOOT, POSSIBLE APPLICATION OF ACELLULAR GRAFT    ADMITTING HPI from excerpted H&P   Sera Beltran is a 69 y.o. female who presents with with past medical history of CKD, heart failure, CAD presents to the ED department due to left lower extremity wound. Patient originally went to [facility] due to lower extremity swelling and a cut in her left foot. Her left foot was wrapped in gauze and she was discharged on 10/24/2023. She endorsed that wound care nurse was supposed to come each day and unwrap the wound but they never arrived. Patient lives in a senior independent facility and was supposed to receive home health care twice a week as well. On 10/28/2023 patient's foot was unwrapped and it was noted to be draining purulent fluid with the addition of maggots. She did arrive to ED nontoxic afebrile, and hemodynamically stable.   On

## 2023-11-04 NOTE — PROGRESS NOTES
[facility]                                                                               Hospitalist Progress Note  Tato Zhu MD          Date of Service:  2023  NAME:  Sammie Dunn  :    MRN:  [id number]    Please note that this dictation was completed with [employer], the computer voice recognition software. Quite often unanticipated grammatical, syntax, homophones, and other interpretive errors are inadvertently transcribed by the computer software. Please disregard these errors. Please excuse any errors that have escaped final proofreading. Admission Summary:   Sammie Dunn is a 69 y.o. female who presents with with past medical history of CKD, heart failure, CAD presents to the ED department due to left lower extremity wound. Patient originally went to [facility] due to lower extremity swelling and a cut in her left foot. Her left foot was wrapped in gauze and she was discharged on 10/24/2023. She endorsed that wound care nurse was supposed to come each day and unwrap the wound but they never arrived. Patient lives in a senior independent facility and was supposed to receive home health care twice a week as well. On 10/28/2023 patient's foot was unwrapped and it was noted to be draining purulent fluid with the addition of maggots. She did arrive to ED nontoxic afebrile, and hemodynamically stable. On exam patient's lower extremities were pitting +3 however patient was not in acute exacerbation . Significant lab results include WBC within normal limits, , creatinine 2.28, BUN 3 4, , . Patient admitted for wound care and parenteral antibiotic therapy for the treatment of cellulitis. Interval history / Subjective:   Patient did not have any acute event overnight. Denies any new complaints.      Assessment & Plan:     Anticipated discharge date : TBD  Anticipated disposition : TBD  Barriers to discharge : last 72 hours. Invalid input(s): \"FE\", \"PSAT\"     No results found for: \"FOL\", \"RBCF\"   No results for input(s): \"PH\", \"PCO2\", \"PO2\" in the last 72 hours. No results for input(s): \"CPK\" in the last 72 hours. Invalid input(s): \"CPKMB\", \"CKNDX\", \"TROIQ\"  Lab Results   Component Value Date/Time    CHOL 146 10/29/2023 07:21 AM    HDL 42 10/29/2023 07:21 AM     No results found for: \"GLUCPOC\"  [unfilled]    Notes reviewed from all clinical/nonclinical/nursing services involved in patient's clinical care. Care coordination discussions were held with appropriate clinical/nonclinical/ nursing providers based on care coordination needs. Patients current active Medications were reviewed, considered, added and adjusted based on the clinical condition today. Home Medications were reconciled to the best of my ability given all available resources at the time of admission. Route is PO if not otherwise noted.       Admission Status:[id number]:::1}      Medications Reviewed:     Current Facility-Administered Medications   Medication Dose Route Frequency    metoprolol tartrate (LOPRESSOR) tablet 12.5 mg  12.5 mg Oral BID    vancomycin (VANCOCIN) 750 mg in sodium chloride 0.9 % 250 mL IVPB (vial-mate)  750 mg IntraVENous Q24H    fluconazole (DIFLUCAN) tablet 100 mg  100 mg Oral Daily    furosemide (LASIX) tablet 20 mg  20 mg Oral Daily    DULoxetine (CYMBALTA) extended release capsule 30 mg  30 mg Oral Daily    levothyroxine (SYNTHROID) tablet 50 mcg  50 mcg Oral Daily    sodium hypochlorite (DAKINS) 0.125 % external solution   Irrigation BID    enoxaparin (LOVENOX) injection 40 mg  40 mg SubCUTAneous Daily    ceFEPIme (MAXIPIME) 2,000 mg in sodium chloride 0.9 % 100 mL IVPB (mini-bag)  2,000 mg IntraVENous Q12H    sodium chloride flush 0.9 % injection 5-40 mL  5-40 mL IntraVENous 2 times per day    sodium chloride flush 0.9 % injection 5-40 mL  5-40 mL IntraVENous PRN    0.9 % sodium chloride infusion   IntraVENous PRN

## 2023-11-04 NOTE — PROGRESS NOTES
Bedside and Verbal shift change report given to Ricardo Avila (oncoming nurse) by Queenie Westbrook (offgoing nurse). Report included the following information Nurse Handoff Report, Intake/Output, MAR, and Recent Results.

## 2023-11-04 NOTE — CARE COORDINATION
GAMA     RUR  13 %   MINAL to SNF Today     Talked to MD and Nursing. Discussed transportation- patient can go via [address] and they have a bed for patient today. Authorization # from the Insurance [id number]    Nursing to call report  [phone]   Needs to fax  [phone]  AVS & Wound Care orders. Send hardcopy of  AVS, Discharge Summary. Current MAR- Hard script for Pain meds or any psych meds. Patient does not have any funds to pay for wheel chair   [facility]  [phone]  to transport and bring wheel chair- they can pick patient up at 2pm.  To bill to the hospital.     CM to do follow-up call on Monday. Will need to call the [address] [place] [address] Street to make appointment to see Dr. Lucian Julian in 2 two weeks. This information added to AVS.     She also recommend Vascular follow-up to call on Monday. Patient in agreement for SNF-  Second IMM letter discussed- verbally -copy to go on chart- copy to go in her discharge package.       Darnell Sanchez MSW     [phone]

## 2023-11-04 NOTE — PROGRESS NOTES
0740 Bedside shift report. 4127 Patient sitting up in bed, eating breakfast. Repositioned at this time. 1050 Patient is sitting up in bed with eyes opened, watching tv. Repositioned at this time. Denies further needs. 1130 QM informed patient can be transported home today with [facility]. Patient will be in room 57A. Phone number to call report [phone].  7646 Attempted report at this time, sat on hold for 17 minutes and then call dropped or was hung up on.  1218 Patient sitting up in bed eating lunch. 1225 Attempted report again at this time. No answer. Left message on voicemail box with callback number. [phone] Written report faxed with face sheet, AVS, wound care orders, & UTD MAR.

## 2023-11-05 LAB
BACTERIA SPEC CULT: ABNORMAL
GRAM STN SPEC: ABNORMAL
SERVICE CMNT-IMP: ABNORMAL
SERVICE CMNT-IMP: ABNORMAL

## 2023-11-06 ENCOUNTER — TELEPHONE (OUTPATIENT)
Facility: HOSPITAL | Age: 78
End: 2023-11-06

## 2023-11-06 LAB
BACTERIA SPEC CULT: ABNORMAL
GRAM STN SPEC: ABNORMAL
GRAM STN SPEC: ABNORMAL
SERVICE CMNT-IMP: ABNORMAL

## 2023-11-06 NOTE — CARE COORDINATION
GAMA:    RUR: 14%    As discussed during the initial case management assessment, CM sent a referral to the [facility] Care Transition Program for pt through [employer] . Referral reason: Pt may be in need of companionship and/or personal care services upon returning to her apartment after discharged from the SNF, and could potentially benefit from assistance with prepared meals, light house-keeping, transportation to medical appointments, and an assessment of the accessible equipment in her home.  [facility] to follow up with pt directly regarding this referral.    Antoine Black, [address] ,   [phone]

## 2023-11-06 NOTE — TELEPHONE ENCOUNTER
Called and spoke with PCP, Dr. Kourtney Pham and informed her about wound culture results and need for adjustment of antibiotics. She agreed to adjust antibiotic accordingly. Marcelina called [facility]) and spoke with pt's nurse, Vibha, and told her in detail about positive wound cx. She will pass it on to pt's provider Dr. Mariaa Bee for ABX adjustment. As per nurse request, wound cx result to be faxed as well. Given wound cx report to case management to fax.

## 2023-11-07 ENCOUNTER — FOLLOWUP TELEPHONE ENCOUNTER (OUTPATIENT)
Facility: HOSPITAL | Age: 78
End: 2023-11-07

## 2023-11-07 NOTE — TELEPHONE ENCOUNTER
Hospital Follow Up    CM to arrange follow up appointments for patient. Called Wound clinic at [facility]. Follow up appt on 11/17 at 12:45pm.     Added to AVS    [id number] [place] with [facility] to get follow up. Appt on 11/14 at 10:30AM.   Faxed paperwork to them at [phone]. [facility] [place] to relay appt information to staff and patient. Kept getting placed on hold or no answer. Called and spoke to Markel in admissions. They will have someone call CM back so we can provide them with the appointment information. Piotr from [facility] called. Spoke to her and gave information regarding both appointments. Date, time, doctor name and addresses given to her.      VELMA White, RN, CM

## 2024-05-06 NOTE — ED NOTES
Bedside and Verbal shift change report given to JIL RN  (oncoming nurse) by Ling Rodriguez RN  (offgoing nurse). Report included the following information SBAR, Kardex, ED Summary, Intake/Output, MAR and Recent Results. Patient report previously called to receiving unit. Patient ready for transport, awaiting room to be assigned. Goal Outcome Evaluation:    Admitted with Human Metapneumovirus    A&Ox3, disoriented to time. VSS ex BP, on 4 L O2 via Oxymask w/ pulse oxymetry in place. Denies SOB, CP. LS coarse/diminished. Intermittent nonproductive cough present. Minimum pain to right knee managed w/ prn tylenol. PIV SL. Up with Ax1 GBW. Incontinent of B&B. Scattered bruising. Urine culture positive. I/D consult. Pending discharge.

## (undated) DEVICE — SPONGE GZ W4XL4IN COT 12 PLY TYP VII WVN C FLD DSGN STERILE

## (undated) DEVICE — PINNACLE INTRODUCER SHEATH: Brand: PINNACLE

## (undated) DEVICE — DRAPE,REIN 53X77,STERILE: Brand: MEDLINE

## (undated) DEVICE — CONTAINER SPEC ANAERB VACTNR

## (undated) DEVICE — CATHETER ETER CARD MULTIPAK MULTIPAK 5FR PERFORMA

## (undated) DEVICE — CATHETER ANGIO IM AD 5 FRX100 CM PERFORMA

## (undated) DEVICE — DRAPE,EXTREMITY,89X128,STERILE: Brand: MEDLINE

## (undated) DEVICE — GLOVE SURG SZ 65 THK91MIL LTX FREE SYN POLYISOPRENE

## (undated) DEVICE — PACK PROCEDURE SURG HRT CATH

## (undated) DEVICE — COVER LT HNDL PLAS RIG 1 PER PK

## (undated) DEVICE — STOCKINETTE,DOUBLE PLY,6X54,STERILE: Brand: MEDLINE

## (undated) DEVICE — SOLUTION SCRB 2OZ 10% POVIDONE IOD ANTIMIC BTL

## (undated) DEVICE — SYRINGE MED 10ML LUERLOCK TIP W/O SFTY DISP

## (undated) DEVICE — ANGIOGRAPHY KIT CUST [K0910930B] [MERIT MEDICAL SYSTEMS INC]

## (undated) DEVICE — SOLUTION IRRIG 500ML 0.9% SOD CHLO USP POUR PLAS BTL

## (undated) DEVICE — STRIP SKIN CLSR W0.25XL4IN WHT SPUNBOUND FBR NYL HI ADH

## (undated) DEVICE — SYRINGE,EAR/ULCER, 2 OZ, STERILE: Brand: MEDLINE

## (undated) DEVICE — PREMIUM WET SKIN PREP TRAY: Brand: MEDLINE INDUSTRIES, INC.

## (undated) DEVICE — GUIDEWIRE VASC L260CM 0.035IN J TIP L3MM PTFE FIX COR NAMIC

## (undated) DEVICE — TOWEL,OR,DSP,ST,BLUE,STD,2/PK,40PK/CS: Brand: MEDLINE

## (undated) DEVICE — SWAB CULT LIQ STUART AGR AERB MOD IN BRK SGL RAYON TIP PLAS

## (undated) DEVICE — ADULT SPO2 SENSOR: Brand: NELLCOR

## (undated) DEVICE — HYPODERMIC SAFETY NEEDLE: Brand: MONOJECT

## (undated) DEVICE — SYR ART 700 CLEAR MARK 7 -- ARTERION

## (undated) DEVICE — BANDAGE,GAUZE,CONFORMING,3"X75",STRL,LF: Brand: MEDLINE

## (undated) DEVICE — AIRLIFE™  ADULT CUSHION NASAL CANNULA WITH 7 FOOT (2.1 M) CRUSH-RESISTANT OXYGEN TUBING, AND U/CONNECT-IT ADAPTER: Brand: AIRLIFE™

## (undated) DEVICE — BLADE ASSEMB CLP HAIR FINE --

## (undated) DEVICE — MINOR BASIN -SMH: Brand: MEDLINE INDUSTRIES, INC.

## (undated) DEVICE — DRESSING HEMOSTATIC SFT INTVENT W/O SLT DBL WRP QUIKCLOT LF